# Patient Record
Sex: FEMALE | Race: WHITE | Employment: OTHER | URBAN - NONMETROPOLITAN AREA
[De-identification: names, ages, dates, MRNs, and addresses within clinical notes are randomized per-mention and may not be internally consistent; named-entity substitution may affect disease eponyms.]

---

## 2019-10-23 ENCOUNTER — HOSPITAL ENCOUNTER (OUTPATIENT)
Dept: GENERAL RADIOLOGY | Age: 74
Discharge: HOME | End: 2019-10-23
Payer: MEDICARE

## 2019-10-23 ENCOUNTER — HOSPITAL ENCOUNTER (OUTPATIENT)
Dept: PREADMISSION TESTING | Age: 74
Discharge: HOME | End: 2019-10-27
Payer: MEDICARE

## 2019-10-23 VITALS
DIASTOLIC BLOOD PRESSURE: 68 MMHG | BODY MASS INDEX: 23.9 KG/M2 | HEART RATE: 90 BPM | SYSTOLIC BLOOD PRESSURE: 164 MMHG | RESPIRATION RATE: 18 BRPM | TEMPERATURE: 96.2 F | WEIGHT: 129.85 LBS | OXYGEN SATURATION: 96 % | HEIGHT: 62 IN

## 2019-10-23 DIAGNOSIS — M48.061 SPINAL STENOSIS OF LUMBAR REGION, UNSPECIFIED WHETHER NEUROGENIC CLAUDICATION PRESENT: ICD-10-CM

## 2019-10-23 DIAGNOSIS — Z01.818 PREOP TESTING: ICD-10-CM

## 2019-10-23 LAB
ALBUMIN SERPL-MCNC: 3.8 G/DL (ref 3.5–5.1)
ANION GAP SERPL CALCULATED.3IONS-SCNC: 15 MEQ/L (ref 8–16)
BASOPHILS # BLD: 0.9 %
BASOPHILS ABSOLUTE: 0 THOU/MM3 (ref 0–0.1)
BUN BLDV-MCNC: 15 MG/DL (ref 7–22)
CALCIUM SERPL-MCNC: 9.5 MG/DL (ref 8.5–10.5)
CHLORIDE BLD-SCNC: 98 MEQ/L (ref 98–111)
CO2: 28 MEQ/L (ref 23–33)
CREAT SERPL-MCNC: 0.6 MG/DL (ref 0.4–1.2)
EOSINOPHIL # BLD: 2.2 %
EOSINOPHILS ABSOLUTE: 0.1 THOU/MM3 (ref 0–0.4)
ERYTHROCYTE [DISTWIDTH] IN BLOOD BY AUTOMATED COUNT: 13.7 % (ref 11.5–14.5)
ERYTHROCYTE [DISTWIDTH] IN BLOOD BY AUTOMATED COUNT: 46.5 FL (ref 35–45)
GFR SERPL CREATININE-BSD FRML MDRD: > 90 ML/MIN/1.73M2
GLUCOSE BLD-MCNC: 85 MG/DL (ref 70–108)
HCT VFR BLD CALC: 38 % (ref 37–47)
HEMOGLOBIN: 11.8 GM/DL (ref 12–16)
IMMATURE GRANS (ABS): 0.01 THOU/MM3 (ref 0–0.07)
IMMATURE GRANULOCYTES: 0.2 %
LYMPHOCYTES # BLD: 19.2 %
LYMPHOCYTES ABSOLUTE: 1 THOU/MM3 (ref 1–4.8)
MCH RBC QN AUTO: 29.1 PG (ref 26–33)
MCHC RBC AUTO-ENTMCNC: 31.1 GM/DL (ref 32.2–35.5)
MCV RBC AUTO: 93.8 FL (ref 81–99)
MONOCYTES # BLD: 11 %
MONOCYTES ABSOLUTE: 0.6 THOU/MM3 (ref 0.4–1.3)
MRSA NASAL SCREEN RT-PCR: NEGATIVE
NUCLEATED RED BLOOD CELLS: 0 /100 WBC
PLATELET # BLD: 302 THOU/MM3 (ref 130–400)
PMV BLD AUTO: 10.6 FL (ref 9.4–12.4)
POTASSIUM SERPL-SCNC: 3.7 MEQ/L (ref 3.5–5.2)
PREALBUMIN: 20.2 MG/DL (ref 20–40)
RBC # BLD: 4.05 MILL/MM3 (ref 4.2–5.4)
SEG NEUTROPHILS: 66.5 %
SEGMENTED NEUTROPHILS ABSOLUTE COUNT: 3.6 THOU/MM3 (ref 1.8–7.7)
SODIUM BLD-SCNC: 141 MEQ/L (ref 135–145)
STAPH AUREUS SCREEN RT-PCR: NEGATIVE
WBC # BLD: 5.4 THOU/MM3 (ref 4.8–10.8)

## 2019-10-23 PROCEDURE — 36415 COLL VENOUS BLD VENIPUNCTURE: CPT

## 2019-10-23 PROCEDURE — 87081 CULTURE SCREEN ONLY: CPT

## 2019-10-23 PROCEDURE — 87641 MR-STAPH DNA AMP PROBE: CPT

## 2019-10-23 PROCEDURE — 85025 COMPLETE CBC W/AUTO DIFF WBC: CPT

## 2019-10-23 PROCEDURE — 71046 X-RAY EXAM CHEST 2 VIEWS: CPT

## 2019-10-23 PROCEDURE — 87640 STAPH A DNA AMP PROBE: CPT

## 2019-10-23 PROCEDURE — 80048 BASIC METABOLIC PNL TOTAL CA: CPT

## 2019-10-23 PROCEDURE — 84134 ASSAY OF PREALBUMIN: CPT

## 2019-10-23 PROCEDURE — 82040 ASSAY OF SERUM ALBUMIN: CPT

## 2019-10-23 RX ORDER — POTASSIUM CHLORIDE 750 MG/1
10 CAPSULE, EXTENDED RELEASE ORAL 2 TIMES DAILY
COMMUNITY

## 2019-10-23 RX ORDER — LEVOTHYROXINE SODIUM 0.05 MG/1
50 TABLET ORAL DAILY
COMMUNITY

## 2019-10-23 RX ORDER — BUPROPION HYDROCHLORIDE 200 MG/1
200 TABLET, EXTENDED RELEASE ORAL DAILY
COMMUNITY

## 2019-10-23 RX ORDER — DULOXETIN HYDROCHLORIDE 30 MG/1
30 CAPSULE, DELAYED RELEASE ORAL DAILY
COMMUNITY

## 2019-10-23 RX ORDER — CHLORDIAZEPOXIDE HYDROCHLORIDE 5 MG/1
5 CAPSULE, GELATIN COATED ORAL 2 TIMES DAILY PRN
COMMUNITY

## 2019-10-23 RX ORDER — OXYCODONE HYDROCHLORIDE AND ACETAMINOPHEN 5; 325 MG/1; MG/1
1 TABLET ORAL EVERY 8 HOURS PRN
Status: ON HOLD | COMMUNITY
End: 2019-11-11 | Stop reason: HOSPADM

## 2019-10-23 RX ORDER — ALENDRONATE SODIUM 70 MG/1
70 TABLET ORAL
Status: ON HOLD | COMMUNITY
End: 2019-11-06 | Stop reason: ALTCHOICE

## 2019-10-23 RX ORDER — PANTOPRAZOLE SODIUM 40 MG/1
40 TABLET, DELAYED RELEASE ORAL 2 TIMES DAILY
COMMUNITY

## 2019-10-23 RX ORDER — TRIAMCINOLONE ACETONIDE 1 MG/ML
LOTION TOPICAL 3 TIMES DAILY
COMMUNITY

## 2019-10-23 RX ORDER — DULOXETIN HYDROCHLORIDE 60 MG/1
60 CAPSULE, DELAYED RELEASE ORAL NIGHTLY
COMMUNITY

## 2019-10-23 RX ORDER — GABAPENTIN 100 MG/1
200 CAPSULE ORAL 3 TIMES DAILY
COMMUNITY
End: 2020-10-01

## 2019-10-23 RX ORDER — SUCRALFATE 1 G/1
1 TABLET ORAL 4 TIMES DAILY
COMMUNITY

## 2019-10-23 RX ORDER — FERROUS SULFATE 325(65) MG
325 TABLET ORAL
COMMUNITY

## 2019-10-23 RX ORDER — FUROSEMIDE 40 MG/1
40 TABLET ORAL DAILY PRN
COMMUNITY

## 2019-10-25 LAB — MRSA SCREEN: NORMAL

## 2019-11-06 ENCOUNTER — ANESTHESIA EVENT (OUTPATIENT)
Dept: OPERATING ROOM | Age: 74
DRG: 457 | End: 2019-11-06
Payer: MEDICARE

## 2019-11-06 ENCOUNTER — HOSPITAL ENCOUNTER (INPATIENT)
Age: 74
LOS: 5 days | Discharge: NURSING, REHAB OR LONG-TERM CARE | DRG: 457 | End: 2019-11-11
Attending: ORTHOPAEDIC SURGERY | Admitting: ORTHOPAEDIC SURGERY
Payer: MEDICARE

## 2019-11-06 ENCOUNTER — APPOINTMENT (OUTPATIENT)
Dept: GENERAL RADIOLOGY | Age: 74
DRG: 457 | End: 2019-11-06
Attending: ORTHOPAEDIC SURGERY
Payer: MEDICARE

## 2019-11-06 ENCOUNTER — ANESTHESIA (OUTPATIENT)
Dept: OPERATING ROOM | Age: 74
DRG: 457 | End: 2019-11-06
Payer: MEDICARE

## 2019-11-06 VITALS
DIASTOLIC BLOOD PRESSURE: 66 MMHG | OXYGEN SATURATION: 100 % | RESPIRATION RATE: 5 BRPM | SYSTOLIC BLOOD PRESSURE: 127 MMHG | TEMPERATURE: 96.1 F

## 2019-11-06 DIAGNOSIS — Z98.1 S/P LUMBAR FUSION: Primary | ICD-10-CM

## 2019-11-06 PROBLEM — G95.19 NEUROGENIC CLAUDICATION (HCC): Status: ACTIVE | Noted: 2019-11-06

## 2019-11-06 PROBLEM — R29.818 NEUROGENIC CLAUDICATION: Status: ACTIVE | Noted: 2019-11-06

## 2019-11-06 LAB
ABO: NORMAL
ANTIBODY SCREEN: NORMAL
POTASSIUM SERPL-SCNC: 3.3 MEQ/L (ref 3.5–5.2)
RH FACTOR: NORMAL

## 2019-11-06 PROCEDURE — 2720000010 HC SURG SUPPLY STERILE: Performed by: ORTHOPAEDIC SURGERY

## 2019-11-06 PROCEDURE — 72100 X-RAY EXAM L-S SPINE 2/3 VWS: CPT

## 2019-11-06 PROCEDURE — 86850 RBC ANTIBODY SCREEN: CPT

## 2019-11-06 PROCEDURE — 2500000003 HC RX 250 WO HCPCS: Performed by: PHYSICIAN ASSISTANT

## 2019-11-06 PROCEDURE — 1200000000 HC SEMI PRIVATE

## 2019-11-06 PROCEDURE — 2500000003 HC RX 250 WO HCPCS: Performed by: NURSE ANESTHETIST, CERTIFIED REGISTERED

## 2019-11-06 PROCEDURE — 00NY0ZZ RELEASE LUMBAR SPINAL CORD, OPEN APPROACH: ICD-10-PCS | Performed by: ORTHOPAEDIC SURGERY

## 2019-11-06 PROCEDURE — P9016 RBC LEUKOCYTES REDUCED: HCPCS

## 2019-11-06 PROCEDURE — C1713 ANCHOR/SCREW BN/BN,TIS/BN: HCPCS | Performed by: ORTHOPAEDIC SURGERY

## 2019-11-06 PROCEDURE — 2580000003 HC RX 258: Performed by: ORTHOPAEDIC SURGERY

## 2019-11-06 PROCEDURE — 2580000003 HC RX 258: Performed by: PHYSICIAN ASSISTANT

## 2019-11-06 PROCEDURE — 6360000002 HC RX W HCPCS: Performed by: PHYSICIAN ASSISTANT

## 2019-11-06 PROCEDURE — 2580000003 HC RX 258: Performed by: NURSE ANESTHETIST, CERTIFIED REGISTERED

## 2019-11-06 PROCEDURE — 6370000000 HC RX 637 (ALT 250 FOR IP): Performed by: PHYSICIAN ASSISTANT

## 2019-11-06 PROCEDURE — 6360000002 HC RX W HCPCS: Performed by: ANESTHESIOLOGY

## 2019-11-06 PROCEDURE — 86901 BLOOD TYPING SEROLOGIC RH(D): CPT

## 2019-11-06 PROCEDURE — 72020 X-RAY EXAM OF SPINE 1 VIEW: CPT

## 2019-11-06 PROCEDURE — 86923 COMPATIBILITY TEST ELECTRIC: CPT

## 2019-11-06 PROCEDURE — 6360000002 HC RX W HCPCS

## 2019-11-06 PROCEDURE — 00PU3MZ REMOVAL OF NEUROSTIMULATOR LEAD FROM SPINAL CANAL, PERCUTANEOUS APPROACH: ICD-10-PCS | Performed by: ORTHOPAEDIC SURGERY

## 2019-11-06 PROCEDURE — P9045 ALBUMIN (HUMAN), 5%, 250 ML: HCPCS | Performed by: NURSE ANESTHETIST, CERTIFIED REGISTERED

## 2019-11-06 PROCEDURE — 2709999900 HC NON-CHARGEABLE SUPPLY: Performed by: ORTHOPAEDIC SURGERY

## 2019-11-06 PROCEDURE — 6360000002 HC RX W HCPCS: Performed by: NURSE ANESTHETIST, CERTIFIED REGISTERED

## 2019-11-06 PROCEDURE — 7100000000 HC PACU RECOVERY - FIRST 15 MIN: Performed by: ORTHOPAEDIC SURGERY

## 2019-11-06 PROCEDURE — 2700000000 HC OXYGEN THERAPY PER DAY

## 2019-11-06 PROCEDURE — 36415 COLL VENOUS BLD VENIPUNCTURE: CPT

## 2019-11-06 PROCEDURE — 3700000000 HC ANESTHESIA ATTENDED CARE: Performed by: ORTHOPAEDIC SURGERY

## 2019-11-06 PROCEDURE — 7100000001 HC PACU RECOVERY - ADDTL 15 MIN: Performed by: ORTHOPAEDIC SURGERY

## 2019-11-06 PROCEDURE — 0JPT0MZ REMOVAL OF STIMULATOR GENERATOR FROM TRUNK SUBCUTANEOUS TISSUE AND FASCIA, OPEN APPROACH: ICD-10-PCS | Performed by: ORTHOPAEDIC SURGERY

## 2019-11-06 PROCEDURE — 94760 N-INVAS EAR/PLS OXIMETRY 1: CPT

## 2019-11-06 PROCEDURE — 3700000001 HC ADD 15 MINUTES (ANESTHESIA): Performed by: ORTHOPAEDIC SURGERY

## 2019-11-06 PROCEDURE — 3600000005 HC SURGERY LEVEL 5 BASE: Performed by: ORTHOPAEDIC SURGERY

## 2019-11-06 PROCEDURE — 2500000003 HC RX 250 WO HCPCS: Performed by: ORTHOPAEDIC SURGERY

## 2019-11-06 PROCEDURE — 3600000015 HC SURGERY LEVEL 5 ADDTL 15MIN: Performed by: ORTHOPAEDIC SURGERY

## 2019-11-06 PROCEDURE — 84132 ASSAY OF SERUM POTASSIUM: CPT

## 2019-11-06 PROCEDURE — 0SG1071 FUSION OF 2 OR MORE LUMBAR VERTEBRAL JOINTS WITH AUTOLOGOUS TISSUE SUBSTITUTE, POSTERIOR APPROACH, POSTERIOR COLUMN, OPEN APPROACH: ICD-10-PCS | Performed by: ORTHOPAEDIC SURGERY

## 2019-11-06 PROCEDURE — 01NB0ZZ RELEASE LUMBAR NERVE, OPEN APPROACH: ICD-10-PCS | Performed by: ORTHOPAEDIC SURGERY

## 2019-11-06 PROCEDURE — 86900 BLOOD TYPING SEROLOGIC ABO: CPT

## 2019-11-06 DEVICE — IMPLANTABLE DEVICE
Type: IMPLANTABLE DEVICE | Site: SPINE LUMBAR | Status: FUNCTIONAL
Brand: SET SCREW

## 2019-11-06 DEVICE — CURVED ROD, 5.5 X 80
Type: IMPLANTABLE DEVICE | Site: SPINE LUMBAR | Status: FUNCTIONAL
Brand: CURVED ROD, 5.5 X 80

## 2019-11-06 DEVICE — POLYAXIAL SCREW, 6.5 X 45
Type: IMPLANTABLE DEVICE | Site: SPINE LUMBAR | Status: FUNCTIONAL
Brand: POLYAXIAL SCREW, 6.5 X 45

## 2019-11-06 DEVICE — POLYAXIAL SCREW, 7.5 X 45
Type: IMPLANTABLE DEVICE | Site: SPINE LUMBAR | Status: FUNCTIONAL
Brand: POLYAXIAL SCREW, 7.5 X 45

## 2019-11-06 DEVICE — CROSS CONNECTOR, MEDIUM-LARGE
Type: IMPLANTABLE DEVICE | Site: SPINE LUMBAR | Status: FUNCTIONAL
Brand: CROSS CONNECTOR, MEDIUM-LARGE

## 2019-11-06 DEVICE — DBM T42200 2.5CMX10CM 2 EACH GRAFTON MAT
Type: IMPLANTABLE DEVICE | Site: SPINE LUMBAR | Status: FUNCTIONAL
Brand: GRAFTON®AND GRAFTON PLUS®DEMINERALIZED BONE MATRIX (DBM)

## 2019-11-06 RX ORDER — PROMETHAZINE HYDROCHLORIDE 25 MG/ML
12.5 INJECTION, SOLUTION INTRAMUSCULAR; INTRAVENOUS
Status: DISCONTINUED | OUTPATIENT
Start: 2019-11-06 | End: 2019-11-06 | Stop reason: HOSPADM

## 2019-11-06 RX ORDER — BISACODYL 10 MG
10 SUPPOSITORY, RECTAL RECTAL DAILY PRN
Status: DISCONTINUED | OUTPATIENT
Start: 2019-11-06 | End: 2019-11-11 | Stop reason: HOSPADM

## 2019-11-06 RX ORDER — LEVOTHYROXINE SODIUM 0.05 MG/1
50 TABLET ORAL DAILY
Status: DISCONTINUED | OUTPATIENT
Start: 2019-11-07 | End: 2019-11-11 | Stop reason: HOSPADM

## 2019-11-06 RX ORDER — ACETAMINOPHEN 325 MG/1
650 TABLET ORAL EVERY 6 HOURS
Status: DISCONTINUED | OUTPATIENT
Start: 2019-11-06 | End: 2019-11-11 | Stop reason: HOSPADM

## 2019-11-06 RX ORDER — DULOXETIN HYDROCHLORIDE 60 MG/1
60 CAPSULE, DELAYED RELEASE ORAL DAILY
Status: DISCONTINUED | OUTPATIENT
Start: 2019-11-06 | End: 2019-11-11 | Stop reason: HOSPADM

## 2019-11-06 RX ORDER — PROPOFOL 10 MG/ML
INJECTION, EMULSION INTRAVENOUS PRN
Status: DISCONTINUED | OUTPATIENT
Start: 2019-11-06 | End: 2019-11-06 | Stop reason: SDUPTHER

## 2019-11-06 RX ORDER — CYCLOBENZAPRINE HCL 10 MG
10 TABLET ORAL 3 TIMES DAILY PRN
Status: DISCONTINUED | OUTPATIENT
Start: 2019-11-06 | End: 2019-11-11 | Stop reason: HOSPADM

## 2019-11-06 RX ORDER — GABAPENTIN 100 MG/1
200 CAPSULE ORAL 3 TIMES DAILY
Status: DISCONTINUED | OUTPATIENT
Start: 2019-11-06 | End: 2019-11-11 | Stop reason: HOSPADM

## 2019-11-06 RX ORDER — SODIUM CHLORIDE 9 MG/ML
INJECTION, SOLUTION INTRAVENOUS CONTINUOUS PRN
Status: DISCONTINUED | OUTPATIENT
Start: 2019-11-06 | End: 2019-11-06 | Stop reason: SDUPTHER

## 2019-11-06 RX ORDER — NEOSTIGMINE METHYLSULFATE 5 MG/5 ML
SYRINGE (ML) INTRAVENOUS PRN
Status: DISCONTINUED | OUTPATIENT
Start: 2019-11-06 | End: 2019-11-06 | Stop reason: SDUPTHER

## 2019-11-06 RX ORDER — FUROSEMIDE 40 MG/1
40 TABLET ORAL 2 TIMES DAILY
Status: DISCONTINUED | OUTPATIENT
Start: 2019-11-06 | End: 2019-11-11 | Stop reason: HOSPADM

## 2019-11-06 RX ORDER — MEPERIDINE HYDROCHLORIDE 25 MG/ML
12.5 INJECTION INTRAMUSCULAR; INTRAVENOUS; SUBCUTANEOUS EVERY 5 MIN PRN
Status: DISCONTINUED | OUTPATIENT
Start: 2019-11-06 | End: 2019-11-06 | Stop reason: HOSPADM

## 2019-11-06 RX ORDER — FENTANYL CITRATE 50 UG/ML
INJECTION, SOLUTION INTRAMUSCULAR; INTRAVENOUS PRN
Status: DISCONTINUED | OUTPATIENT
Start: 2019-11-06 | End: 2019-11-06 | Stop reason: SDUPTHER

## 2019-11-06 RX ORDER — TRIAMCINOLONE ACETONIDE 1 MG/G
CREAM TOPICAL 3 TIMES DAILY
Status: DISCONTINUED | OUTPATIENT
Start: 2019-11-06 | End: 2019-11-11 | Stop reason: HOSPADM

## 2019-11-06 RX ORDER — SUCRALFATE 1 G/1
1 TABLET ORAL
Status: DISCONTINUED | OUTPATIENT
Start: 2019-11-06 | End: 2019-11-11 | Stop reason: HOSPADM

## 2019-11-06 RX ORDER — SODIUM CHLORIDE 0.9 % (FLUSH) 0.9 %
10 SYRINGE (ML) INJECTION EVERY 12 HOURS SCHEDULED
Status: DISCONTINUED | OUTPATIENT
Start: 2019-11-06 | End: 2019-11-11 | Stop reason: HOSPADM

## 2019-11-06 RX ORDER — CLINDAMYCIN PHOSPHATE 900 MG/50ML
900 INJECTION INTRAVENOUS
Status: COMPLETED | OUTPATIENT
Start: 2019-11-06 | End: 2019-11-06

## 2019-11-06 RX ORDER — LIDOCAINE HYDROCHLORIDE AND EPINEPHRINE 10; 10 MG/ML; UG/ML
INJECTION, SOLUTION INFILTRATION; PERINEURAL PRN
Status: DISCONTINUED | OUTPATIENT
Start: 2019-11-06 | End: 2019-11-06 | Stop reason: HOSPADM

## 2019-11-06 RX ORDER — OXYCODONE HYDROCHLORIDE AND ACETAMINOPHEN 5; 325 MG/1; MG/1
2 TABLET ORAL EVERY 4 HOURS PRN
Status: DISCONTINUED | OUTPATIENT
Start: 2019-11-06 | End: 2019-11-11 | Stop reason: HOSPADM

## 2019-11-06 RX ORDER — SODIUM CHLORIDE 0.9 % (FLUSH) 0.9 %
10 SYRINGE (ML) INJECTION PRN
Status: DISCONTINUED | OUTPATIENT
Start: 2019-11-06 | End: 2019-11-11 | Stop reason: HOSPADM

## 2019-11-06 RX ORDER — BUPROPION HYDROCHLORIDE 100 MG/1
200 TABLET, EXTENDED RELEASE ORAL DAILY
Status: DISCONTINUED | OUTPATIENT
Start: 2019-11-07 | End: 2019-11-11 | Stop reason: HOSPADM

## 2019-11-06 RX ORDER — DULOXETIN HYDROCHLORIDE 30 MG/1
30 CAPSULE, DELAYED RELEASE ORAL DAILY
Status: DISCONTINUED | OUTPATIENT
Start: 2019-11-07 | End: 2019-11-11 | Stop reason: HOSPADM

## 2019-11-06 RX ORDER — PANTOPRAZOLE SODIUM 40 MG/1
40 TABLET, DELAYED RELEASE ORAL 2 TIMES DAILY
Status: DISCONTINUED | OUTPATIENT
Start: 2019-11-06 | End: 2019-11-11 | Stop reason: HOSPADM

## 2019-11-06 RX ORDER — DOCUSATE SODIUM 100 MG/1
100 CAPSULE, LIQUID FILLED ORAL 2 TIMES DAILY
Status: DISCONTINUED | OUTPATIENT
Start: 2019-11-06 | End: 2019-11-11 | Stop reason: HOSPADM

## 2019-11-06 RX ORDER — SUCCINYLCHOLINE/SOD CL,ISO/PF 200MG/10ML
SYRINGE (ML) INTRAVENOUS PRN
Status: DISCONTINUED | OUTPATIENT
Start: 2019-11-06 | End: 2019-11-06 | Stop reason: SDUPTHER

## 2019-11-06 RX ORDER — SODIUM CHLORIDE 9 MG/ML
INJECTION, SOLUTION INTRAVENOUS ONCE
Status: COMPLETED | OUTPATIENT
Start: 2019-11-06 | End: 2019-11-06

## 2019-11-06 RX ORDER — SODIUM CHLORIDE 0.9 % (FLUSH) 0.9 %
10 SYRINGE (ML) INJECTION PRN
Status: DISCONTINUED | OUTPATIENT
Start: 2019-11-06 | End: 2019-11-06 | Stop reason: HOSPADM

## 2019-11-06 RX ORDER — ONDANSETRON 2 MG/ML
INJECTION INTRAMUSCULAR; INTRAVENOUS PRN
Status: DISCONTINUED | OUTPATIENT
Start: 2019-11-06 | End: 2019-11-06 | Stop reason: SDUPTHER

## 2019-11-06 RX ORDER — OXYCODONE HYDROCHLORIDE AND ACETAMINOPHEN 5; 325 MG/1; MG/1
1 TABLET ORAL EVERY 4 HOURS PRN
Status: DISCONTINUED | OUTPATIENT
Start: 2019-11-06 | End: 2019-11-11 | Stop reason: HOSPADM

## 2019-11-06 RX ORDER — LIDOCAINE HCL/PF 100 MG/5ML
SYRINGE (ML) INJECTION PRN
Status: DISCONTINUED | OUTPATIENT
Start: 2019-11-06 | End: 2019-11-06 | Stop reason: SDUPTHER

## 2019-11-06 RX ORDER — DEXAMETHASONE SODIUM PHOSPHATE 4 MG/ML
INJECTION, SOLUTION INTRA-ARTICULAR; INTRALESIONAL; INTRAMUSCULAR; INTRAVENOUS; SOFT TISSUE PRN
Status: DISCONTINUED | OUTPATIENT
Start: 2019-11-06 | End: 2019-11-06 | Stop reason: SDUPTHER

## 2019-11-06 RX ORDER — SODIUM CHLORIDE 9 MG/ML
INJECTION, SOLUTION INTRAVENOUS CONTINUOUS
Status: DISCONTINUED | OUTPATIENT
Start: 2019-11-06 | End: 2019-11-11 | Stop reason: HOSPADM

## 2019-11-06 RX ORDER — SODIUM CHLORIDE 0.9 % (FLUSH) 0.9 %
10 SYRINGE (ML) INJECTION EVERY 12 HOURS SCHEDULED
Status: DISCONTINUED | OUTPATIENT
Start: 2019-11-06 | End: 2019-11-06 | Stop reason: HOSPADM

## 2019-11-06 RX ORDER — CHLORDIAZEPOXIDE HYDROCHLORIDE 5 MG/1
5 CAPSULE, GELATIN COATED ORAL 2 TIMES DAILY PRN
Status: DISCONTINUED | OUTPATIENT
Start: 2019-11-06 | End: 2019-11-11 | Stop reason: HOSPADM

## 2019-11-06 RX ORDER — ALBUMIN, HUMAN INJ 5% 5 %
SOLUTION INTRAVENOUS PRN
Status: DISCONTINUED | OUTPATIENT
Start: 2019-11-06 | End: 2019-11-06 | Stop reason: SDUPTHER

## 2019-11-06 RX ORDER — POTASSIUM CHLORIDE 750 MG/1
10 TABLET, FILM COATED, EXTENDED RELEASE ORAL 2 TIMES DAILY
Status: DISCONTINUED | OUTPATIENT
Start: 2019-11-06 | End: 2019-11-11 | Stop reason: HOSPADM

## 2019-11-06 RX ORDER — FENTANYL CITRATE 50 UG/ML
50 INJECTION, SOLUTION INTRAMUSCULAR; INTRAVENOUS EVERY 5 MIN PRN
Status: DISCONTINUED | OUTPATIENT
Start: 2019-11-06 | End: 2019-11-06 | Stop reason: HOSPADM

## 2019-11-06 RX ORDER — GLYCOPYRROLATE 1 MG/5 ML
SYRINGE (ML) INTRAVENOUS PRN
Status: DISCONTINUED | OUTPATIENT
Start: 2019-11-06 | End: 2019-11-06 | Stop reason: SDUPTHER

## 2019-11-06 RX ORDER — FENTANYL CITRATE 50 UG/ML
INJECTION, SOLUTION INTRAMUSCULAR; INTRAVENOUS
Status: COMPLETED
Start: 2019-11-06 | End: 2019-11-06

## 2019-11-06 RX ORDER — FENTANYL CITRATE 50 UG/ML
25 INJECTION, SOLUTION INTRAMUSCULAR; INTRAVENOUS EVERY 5 MIN PRN
Status: DISCONTINUED | OUTPATIENT
Start: 2019-11-06 | End: 2019-11-06 | Stop reason: HOSPADM

## 2019-11-06 RX ORDER — ONDANSETRON 2 MG/ML
4 INJECTION INTRAMUSCULAR; INTRAVENOUS EVERY 6 HOURS PRN
Status: DISCONTINUED | OUTPATIENT
Start: 2019-11-06 | End: 2019-11-11 | Stop reason: HOSPADM

## 2019-11-06 RX ORDER — POLYETHYLENE GLYCOL 3350 17 G/17G
17 POWDER, FOR SOLUTION ORAL DAILY
Status: DISCONTINUED | OUTPATIENT
Start: 2019-11-06 | End: 2019-11-11 | Stop reason: HOSPADM

## 2019-11-06 RX ORDER — ROCURONIUM BROMIDE 10 MG/ML
INJECTION, SOLUTION INTRAVENOUS PRN
Status: DISCONTINUED | OUTPATIENT
Start: 2019-11-06 | End: 2019-11-06 | Stop reason: SDUPTHER

## 2019-11-06 RX ORDER — LABETALOL 20 MG/4 ML (5 MG/ML) INTRAVENOUS SYRINGE
5 EVERY 10 MIN PRN
Status: DISCONTINUED | OUTPATIENT
Start: 2019-11-06 | End: 2019-11-06 | Stop reason: HOSPADM

## 2019-11-06 RX ADMIN — Medication 120 MG: at 11:10

## 2019-11-06 RX ADMIN — FENTANYL CITRATE 50 MCG: 50 INJECTION INTRAMUSCULAR; INTRAVENOUS at 12:06

## 2019-11-06 RX ADMIN — OXYCODONE HYDROCHLORIDE AND ACETAMINOPHEN 2 TABLET: 5; 325 TABLET ORAL at 22:29

## 2019-11-06 RX ADMIN — OXYCODONE HYDROCHLORIDE AND ACETAMINOPHEN 2 TABLET: 5; 325 TABLET ORAL at 17:23

## 2019-11-06 RX ADMIN — Medication 4 MG: at 13:53

## 2019-11-06 RX ADMIN — Medication 2 G: at 18:30

## 2019-11-06 RX ADMIN — CLINDAMYCIN PHOSPHATE 900 MG: 900 INJECTION, SOLUTION INTRAVENOUS at 11:42

## 2019-11-06 RX ADMIN — FENTANYL CITRATE 50 MCG: 50 INJECTION, SOLUTION INTRAMUSCULAR; INTRAVENOUS at 14:46

## 2019-11-06 RX ADMIN — FENTANYL CITRATE 50 MCG: 50 INJECTION, SOLUTION INTRAMUSCULAR; INTRAVENOUS at 15:12

## 2019-11-06 RX ADMIN — PHENYLEPHRINE HYDROCHLORIDE 200 MCG: 10 INJECTION INTRAVENOUS at 13:02

## 2019-11-06 RX ADMIN — SODIUM CHLORIDE: 9 INJECTION, SOLUTION INTRAVENOUS at 13:44

## 2019-11-06 RX ADMIN — SUCRALFATE 1 G: 1 TABLET ORAL at 20:21

## 2019-11-06 RX ADMIN — HYDROMORPHONE HYDROCHLORIDE 0.5 MG: 1 INJECTION, SOLUTION INTRAMUSCULAR; INTRAVENOUS; SUBCUTANEOUS at 20:18

## 2019-11-06 RX ADMIN — FENTANYL CITRATE 50 MCG: 50 INJECTION INTRAMUSCULAR; INTRAVENOUS at 14:32

## 2019-11-06 RX ADMIN — DEXAMETHASONE SODIUM PHOSPHATE 8 MG: 4 INJECTION, SOLUTION INTRAMUSCULAR; INTRAVENOUS at 12:08

## 2019-11-06 RX ADMIN — TRIAMCINOLONE ACETONIDE: 1 CREAM TOPICAL at 20:21

## 2019-11-06 RX ADMIN — ALBUMIN (HUMAN) 250 ML: 12.5 SOLUTION INTRAVENOUS at 12:45

## 2019-11-06 RX ADMIN — PHENYLEPHRINE HYDROCHLORIDE 200 MCG: 10 INJECTION INTRAVENOUS at 13:24

## 2019-11-06 RX ADMIN — DOCUSATE SODIUM 100 MG: 100 CAPSULE, LIQUID FILLED ORAL at 20:22

## 2019-11-06 RX ADMIN — SODIUM CHLORIDE: 9 INJECTION, SOLUTION INTRAVENOUS at 11:40

## 2019-11-06 RX ADMIN — SODIUM CHLORIDE: 9 INJECTION, SOLUTION INTRAVENOUS at 20:25

## 2019-11-06 RX ADMIN — SODIUM CHLORIDE: 9 INJECTION, SOLUTION INTRAVENOUS at 12:00

## 2019-11-06 RX ADMIN — FENTANYL CITRATE 100 MCG: 50 INJECTION INTRAMUSCULAR; INTRAVENOUS at 11:10

## 2019-11-06 RX ADMIN — GABAPENTIN 200 MG: 100 CAPSULE ORAL at 20:21

## 2019-11-06 RX ADMIN — ROCURONIUM BROMIDE 30 MG: 10 INJECTION INTRAVENOUS at 11:29

## 2019-11-06 RX ADMIN — SODIUM CHLORIDE: 9 INJECTION, SOLUTION INTRAVENOUS at 10:15

## 2019-11-06 RX ADMIN — PHENYLEPHRINE HYDROCHLORIDE 200 MCG: 10 INJECTION INTRAVENOUS at 13:13

## 2019-11-06 RX ADMIN — PROPOFOL 100 MG: 10 INJECTION, EMULSION INTRAVENOUS at 11:10

## 2019-11-06 RX ADMIN — DULOXETINE HYDROCHLORIDE 60 MG: 60 CAPSULE, DELAYED RELEASE ORAL at 20:22

## 2019-11-06 RX ADMIN — SODIUM CHLORIDE: 9 INJECTION, SOLUTION INTRAVENOUS at 10:30

## 2019-11-06 RX ADMIN — ONDANSETRON HYDROCHLORIDE 4 MG: 4 INJECTION, SOLUTION INTRAMUSCULAR; INTRAVENOUS at 13:27

## 2019-11-06 RX ADMIN — Medication 50 MG: at 11:10

## 2019-11-06 RX ADMIN — POTASSIUM CHLORIDE 10 MEQ: 750 TABLET, EXTENDED RELEASE ORAL at 20:22

## 2019-11-06 RX ADMIN — Medication 0.6 MG: at 13:53

## 2019-11-06 RX ADMIN — FENTANYL CITRATE 50 MCG: 50 INJECTION INTRAMUSCULAR; INTRAVENOUS at 14:27

## 2019-11-06 RX ADMIN — PHENYLEPHRINE HYDROCHLORIDE 200 MCG: 10 INJECTION INTRAVENOUS at 12:47

## 2019-11-06 RX ADMIN — PROPOFOL 100 MG: 10 INJECTION, EMULSION INTRAVENOUS at 11:21

## 2019-11-06 ASSESSMENT — PULMONARY FUNCTION TESTS
PIF_VALUE: 19
PIF_VALUE: 19
PIF_VALUE: 0
PIF_VALUE: 10
PIF_VALUE: 19
PIF_VALUE: 19
PIF_VALUE: 20
PIF_VALUE: 14
PIF_VALUE: 19
PIF_VALUE: 18
PIF_VALUE: 14
PIF_VALUE: 1
PIF_VALUE: 19
PIF_VALUE: 21
PIF_VALUE: 18
PIF_VALUE: 19
PIF_VALUE: 10
PIF_VALUE: 19
PIF_VALUE: 1
PIF_VALUE: 19
PIF_VALUE: 19
PIF_VALUE: 21
PIF_VALUE: 19
PIF_VALUE: 1
PIF_VALUE: 20
PIF_VALUE: 0
PIF_VALUE: 14
PIF_VALUE: 14
PIF_VALUE: 19
PIF_VALUE: 18
PIF_VALUE: 20
PIF_VALUE: 18
PIF_VALUE: 19
PIF_VALUE: 18
PIF_VALUE: 23
PIF_VALUE: 22
PIF_VALUE: 19
PIF_VALUE: 7
PIF_VALUE: 19
PIF_VALUE: 14
PIF_VALUE: 21
PIF_VALUE: 18
PIF_VALUE: 19
PIF_VALUE: 20
PIF_VALUE: 18
PIF_VALUE: 19
PIF_VALUE: 17
PIF_VALUE: 0
PIF_VALUE: 18
PIF_VALUE: 20
PIF_VALUE: 14
PIF_VALUE: 18
PIF_VALUE: 19
PIF_VALUE: 10
PIF_VALUE: 1
PIF_VALUE: 22
PIF_VALUE: 19
PIF_VALUE: 1
PIF_VALUE: 19
PIF_VALUE: 2
PIF_VALUE: 19
PIF_VALUE: 17
PIF_VALUE: 19
PIF_VALUE: 18
PIF_VALUE: 18
PIF_VALUE: 19
PIF_VALUE: 10
PIF_VALUE: 20
PIF_VALUE: 18
PIF_VALUE: 19
PIF_VALUE: 18
PIF_VALUE: 15
PIF_VALUE: 19
PIF_VALUE: 18
PIF_VALUE: 19
PIF_VALUE: 18
PIF_VALUE: 19
PIF_VALUE: 18
PIF_VALUE: 18
PIF_VALUE: 20
PIF_VALUE: 18
PIF_VALUE: 0
PIF_VALUE: 1
PIF_VALUE: 19
PIF_VALUE: 2
PIF_VALUE: 19
PIF_VALUE: 14
PIF_VALUE: 14
PIF_VALUE: 19
PIF_VALUE: 11
PIF_VALUE: 18
PIF_VALUE: 19
PIF_VALUE: 17
PIF_VALUE: 19
PIF_VALUE: 19
PIF_VALUE: 11
PIF_VALUE: 21
PIF_VALUE: 0
PIF_VALUE: 19
PIF_VALUE: 14
PIF_VALUE: 1
PIF_VALUE: 2
PIF_VALUE: 18
PIF_VALUE: 19
PIF_VALUE: 19
PIF_VALUE: 10
PIF_VALUE: 10
PIF_VALUE: 19
PIF_VALUE: 19
PIF_VALUE: 18
PIF_VALUE: 19
PIF_VALUE: 18
PIF_VALUE: 1
PIF_VALUE: 19
PIF_VALUE: 19
PIF_VALUE: 20
PIF_VALUE: 20
PIF_VALUE: 18
PIF_VALUE: 19
PIF_VALUE: 20
PIF_VALUE: 1
PIF_VALUE: 19
PIF_VALUE: 1
PIF_VALUE: 19
PIF_VALUE: 10
PIF_VALUE: 19
PIF_VALUE: 19
PIF_VALUE: 20
PIF_VALUE: 14
PIF_VALUE: 19
PIF_VALUE: 19
PIF_VALUE: 18
PIF_VALUE: 19
PIF_VALUE: 14
PIF_VALUE: 13
PIF_VALUE: 19
PIF_VALUE: 14
PIF_VALUE: 19
PIF_VALUE: 20
PIF_VALUE: 3
PIF_VALUE: 18
PIF_VALUE: 18
PIF_VALUE: 19
PIF_VALUE: 10
PIF_VALUE: 21
PIF_VALUE: 10
PIF_VALUE: 19
PIF_VALUE: 18
PIF_VALUE: 10
PIF_VALUE: 0
PIF_VALUE: 19
PIF_VALUE: 19
PIF_VALUE: 14
PIF_VALUE: 19
PIF_VALUE: 13
PIF_VALUE: 19
PIF_VALUE: 19
PIF_VALUE: 20
PIF_VALUE: 20
PIF_VALUE: 18
PIF_VALUE: 18
PIF_VALUE: 20
PIF_VALUE: 19
PIF_VALUE: 14
PIF_VALUE: 20
PIF_VALUE: 18
PIF_VALUE: 19
PIF_VALUE: 18
PIF_VALUE: 5

## 2019-11-06 ASSESSMENT — PAIN DESCRIPTION - DESCRIPTORS
DESCRIPTORS: PRESSURE;SHARP;SHOOTING
DESCRIPTORS: CONSTANT;SHARP

## 2019-11-06 ASSESSMENT — PAIN - FUNCTIONAL ASSESSMENT
PAIN_FUNCTIONAL_ASSESSMENT: 0-10
PAIN_FUNCTIONAL_ASSESSMENT: PREVENTS OR INTERFERES SOME ACTIVE ACTIVITIES AND ADLS

## 2019-11-06 ASSESSMENT — PAIN SCALES - GENERAL
PAINLEVEL_OUTOF10: 10
PAINLEVEL_OUTOF10: 10
PAINLEVEL_OUTOF10: 7
PAINLEVEL_OUTOF10: 9
PAINLEVEL_OUTOF10: 10

## 2019-11-06 ASSESSMENT — PAIN DESCRIPTION - PROGRESSION
CLINICAL_PROGRESSION: NOT CHANGED

## 2019-11-06 ASSESSMENT — PAIN DESCRIPTION - PAIN TYPE
TYPE: SURGICAL PAIN
TYPE: ACUTE PAIN;SURGICAL PAIN

## 2019-11-06 ASSESSMENT — PAIN DESCRIPTION - LOCATION
LOCATION: BACK
LOCATION: BACK

## 2019-11-06 ASSESSMENT — PAIN DESCRIPTION - FREQUENCY: FREQUENCY: CONTINUOUS

## 2019-11-06 ASSESSMENT — PAIN DESCRIPTION - ORIENTATION: ORIENTATION: MID;UPPER

## 2019-11-07 LAB
BASOPHILS # BLD: 0.6 %
BASOPHILS ABSOLUTE: 0 THOU/MM3 (ref 0–0.1)
EOSINOPHIL # BLD: 0.3 %
EOSINOPHILS ABSOLUTE: 0 THOU/MM3 (ref 0–0.4)
ERYTHROCYTE [DISTWIDTH] IN BLOOD BY AUTOMATED COUNT: 13.7 % (ref 11.5–14.5)
ERYTHROCYTE [DISTWIDTH] IN BLOOD BY AUTOMATED COUNT: 49 FL (ref 35–45)
HCT VFR BLD CALC: 25.3 % (ref 37–47)
HEMOGLOBIN: 7.4 GM/DL (ref 12–16)
IMMATURE GRANS (ABS): 0.01 THOU/MM3 (ref 0–0.07)
IMMATURE GRANULOCYTES: 0.1 %
LYMPHOCYTES # BLD: 14.6 %
LYMPHOCYTES ABSOLUTE: 1 THOU/MM3 (ref 1–4.8)
MCH RBC QN AUTO: 28.9 PG (ref 26–33)
MCHC RBC AUTO-ENTMCNC: 29.2 GM/DL (ref 32.2–35.5)
MCV RBC AUTO: 98.8 FL (ref 81–99)
MONOCYTES # BLD: 11.1 %
MONOCYTES ABSOLUTE: 0.8 THOU/MM3 (ref 0.4–1.3)
NUCLEATED RED BLOOD CELLS: 0 /100 WBC
PLATELET # BLD: 232 THOU/MM3 (ref 130–400)
PMV BLD AUTO: 10.1 FL (ref 9.4–12.4)
RBC # BLD: 2.56 MILL/MM3 (ref 4.2–5.4)
SEG NEUTROPHILS: 73.3 %
SEGMENTED NEUTROPHILS ABSOLUTE COUNT: 5.1 THOU/MM3 (ref 1.8–7.7)
WBC # BLD: 6.9 THOU/MM3 (ref 4.8–10.8)

## 2019-11-07 PROCEDURE — 1200000000 HC SEMI PRIVATE

## 2019-11-07 PROCEDURE — 6370000000 HC RX 637 (ALT 250 FOR IP): Performed by: PHYSICIAN ASSISTANT

## 2019-11-07 PROCEDURE — 97162 PT EVAL MOD COMPLEX 30 MIN: CPT

## 2019-11-07 PROCEDURE — 85025 COMPLETE CBC W/AUTO DIFF WBC: CPT

## 2019-11-07 PROCEDURE — 94760 N-INVAS EAR/PLS OXIMETRY 1: CPT

## 2019-11-07 PROCEDURE — 97166 OT EVAL MOD COMPLEX 45 MIN: CPT

## 2019-11-07 PROCEDURE — 6360000002 HC RX W HCPCS: Performed by: PHYSICIAN ASSISTANT

## 2019-11-07 PROCEDURE — 97530 THERAPEUTIC ACTIVITIES: CPT

## 2019-11-07 PROCEDURE — 36415 COLL VENOUS BLD VENIPUNCTURE: CPT

## 2019-11-07 RX ADMIN — HYDROMORPHONE HYDROCHLORIDE 0.5 MG: 1 INJECTION, SOLUTION INTRAMUSCULAR; INTRAVENOUS; SUBCUTANEOUS at 12:06

## 2019-11-07 RX ADMIN — DOCUSATE SODIUM 100 MG: 100 CAPSULE, LIQUID FILLED ORAL at 09:18

## 2019-11-07 RX ADMIN — Medication 2 G: at 02:08

## 2019-11-07 RX ADMIN — SUCRALFATE 1 G: 1 TABLET ORAL at 18:51

## 2019-11-07 RX ADMIN — GABAPENTIN 200 MG: 100 CAPSULE ORAL at 09:18

## 2019-11-07 RX ADMIN — OXYCODONE HYDROCHLORIDE AND ACETAMINOPHEN 2 TABLET: 5; 325 TABLET ORAL at 08:58

## 2019-11-07 RX ADMIN — OXYCODONE HYDROCHLORIDE AND ACETAMINOPHEN 2 TABLET: 5; 325 TABLET ORAL at 17:42

## 2019-11-07 RX ADMIN — DULOXETINE HYDROCHLORIDE 30 MG: 30 CAPSULE, DELAYED RELEASE ORAL at 17:54

## 2019-11-07 RX ADMIN — HYDROMORPHONE HYDROCHLORIDE 0.5 MG: 1 INJECTION, SOLUTION INTRAMUSCULAR; INTRAVENOUS; SUBCUTANEOUS at 16:15

## 2019-11-07 RX ADMIN — FUROSEMIDE 40 MG: 40 TABLET ORAL at 09:22

## 2019-11-07 RX ADMIN — LEVOTHYROXINE SODIUM 50 MCG: 50 TABLET ORAL at 06:49

## 2019-11-07 RX ADMIN — NALOXEGOL OXALATE 12.5 MG: 12.5 TABLET, FILM COATED ORAL at 09:18

## 2019-11-07 RX ADMIN — CYCLOBENZAPRINE 10 MG: 10 TABLET, FILM COATED ORAL at 18:52

## 2019-11-07 RX ADMIN — POTASSIUM CHLORIDE 10 MEQ: 750 TABLET, EXTENDED RELEASE ORAL at 09:18

## 2019-11-07 RX ADMIN — DULOXETINE HYDROCHLORIDE 60 MG: 60 CAPSULE, DELAYED RELEASE ORAL at 22:41

## 2019-11-07 RX ADMIN — FUROSEMIDE 40 MG: 40 TABLET ORAL at 18:52

## 2019-11-07 RX ADMIN — CHLORDIAZEPOXIDE HYDROCHLORIDE 5 MG: 5 CAPSULE ORAL at 12:19

## 2019-11-07 RX ADMIN — PANTOPRAZOLE SODIUM 40 MG: 40 TABLET, DELAYED RELEASE ORAL at 06:49

## 2019-11-07 RX ADMIN — BUPROPION HYDROCHLORIDE 200 MG: 100 TABLET, FILM COATED, EXTENDED RELEASE ORAL at 15:24

## 2019-11-07 RX ADMIN — TRIAMCINOLONE ACETONIDE: 1 CREAM TOPICAL at 09:18

## 2019-11-07 RX ADMIN — GABAPENTIN 200 MG: 100 CAPSULE ORAL at 15:24

## 2019-11-07 RX ADMIN — OXYCODONE HYDROCHLORIDE AND ACETAMINOPHEN 2 TABLET: 5; 325 TABLET ORAL at 04:12

## 2019-11-07 RX ADMIN — SUCRALFATE 1 G: 1 TABLET ORAL at 06:49

## 2019-11-07 RX ADMIN — ACETAMINOPHEN 650 MG: 325 TABLET ORAL at 15:29

## 2019-11-07 RX ADMIN — SUCRALFATE 1 G: 1 TABLET ORAL at 15:25

## 2019-11-07 RX ADMIN — OXYCODONE HYDROCHLORIDE AND ACETAMINOPHEN 2 TABLET: 5; 325 TABLET ORAL at 22:36

## 2019-11-07 RX ADMIN — OXYCODONE HYDROCHLORIDE AND ACETAMINOPHEN 2 TABLET: 5; 325 TABLET ORAL at 13:08

## 2019-11-07 RX ADMIN — GABAPENTIN 200 MG: 100 CAPSULE ORAL at 22:41

## 2019-11-07 RX ADMIN — POLYETHYLENE GLYCOL 3350 17 G: 17 POWDER, FOR SOLUTION ORAL at 09:17

## 2019-11-07 RX ADMIN — PANTOPRAZOLE SODIUM 40 MG: 40 TABLET, DELAYED RELEASE ORAL at 18:51

## 2019-11-07 ASSESSMENT — PAIN SCALES - GENERAL
PAINLEVEL_OUTOF10: 10
PAINLEVEL_OUTOF10: 8
PAINLEVEL_OUTOF10: 6
PAINLEVEL_OUTOF10: 9
PAINLEVEL_OUTOF10: 10
PAINLEVEL_OUTOF10: 8
PAINLEVEL_OUTOF10: 4
PAINLEVEL_OUTOF10: 7
PAINLEVEL_OUTOF10: 10
PAINLEVEL_OUTOF10: 4

## 2019-11-07 ASSESSMENT — PAIN DESCRIPTION - PAIN TYPE
TYPE: SURGICAL PAIN;CHRONIC PAIN
TYPE: ACUTE PAIN;SURGICAL PAIN

## 2019-11-07 ASSESSMENT — PAIN DESCRIPTION - ONSET: ONSET: ON-GOING

## 2019-11-07 ASSESSMENT — PAIN DESCRIPTION - PROGRESSION
CLINICAL_PROGRESSION: NOT CHANGED

## 2019-11-07 ASSESSMENT — PAIN DESCRIPTION - ORIENTATION: ORIENTATION: LOWER

## 2019-11-07 ASSESSMENT — PAIN DESCRIPTION - LOCATION
LOCATION: BACK
LOCATION: BACK

## 2019-11-07 ASSESSMENT — PAIN DESCRIPTION - DESCRIPTORS: DESCRIPTORS: ACHING

## 2019-11-07 ASSESSMENT — PAIN - FUNCTIONAL ASSESSMENT: PAIN_FUNCTIONAL_ASSESSMENT: PREVENTS OR INTERFERES SOME ACTIVE ACTIVITIES AND ADLS

## 2019-11-07 ASSESSMENT — PAIN DESCRIPTION - FREQUENCY: FREQUENCY: CONTINUOUS

## 2019-11-08 LAB
BASOPHILS # BLD: 0.4 %
BASOPHILS ABSOLUTE: 0 THOU/MM3 (ref 0–0.1)
EOSINOPHIL # BLD: 1.6 %
EOSINOPHILS ABSOLUTE: 0.2 THOU/MM3 (ref 0–0.4)
ERYTHROCYTE [DISTWIDTH] IN BLOOD BY AUTOMATED COUNT: 13.9 % (ref 11.5–14.5)
ERYTHROCYTE [DISTWIDTH] IN BLOOD BY AUTOMATED COUNT: 48.2 FL (ref 35–45)
HCT VFR BLD CALC: 25.3 % (ref 37–47)
HCT VFR BLD CALC: 26.5 % (ref 37–47)
HEMOGLOBIN: 7.8 GM/DL (ref 12–16)
HEMOGLOBIN: 8.5 GM/DL (ref 12–16)
IMMATURE GRANS (ABS): 0.04 THOU/MM3 (ref 0–0.07)
IMMATURE GRANULOCYTES: 0.4 %
LYMPHOCYTES # BLD: 6.2 %
LYMPHOCYTES ABSOLUTE: 0.6 THOU/MM3 (ref 1–4.8)
MCH RBC QN AUTO: 29.2 PG (ref 26–33)
MCHC RBC AUTO-ENTMCNC: 30.8 GM/DL (ref 32.2–35.5)
MCV RBC AUTO: 94.8 FL (ref 81–99)
MONOCYTES # BLD: 10.5 %
MONOCYTES ABSOLUTE: 1 THOU/MM3 (ref 0.4–1.3)
NUCLEATED RED BLOOD CELLS: 0 /100 WBC
PLATELET # BLD: 243 THOU/MM3 (ref 130–400)
PMV BLD AUTO: 10.5 FL (ref 9.4–12.4)
RBC # BLD: 2.67 MILL/MM3 (ref 4.2–5.4)
SEG NEUTROPHILS: 80.9 %
SEGMENTED NEUTROPHILS ABSOLUTE COUNT: 7.9 THOU/MM3 (ref 1.8–7.7)
WBC # BLD: 9.8 THOU/MM3 (ref 4.8–10.8)

## 2019-11-08 PROCEDURE — 36430 TRANSFUSION BLD/BLD COMPNT: CPT

## 2019-11-08 PROCEDURE — 97110 THERAPEUTIC EXERCISES: CPT

## 2019-11-08 PROCEDURE — 6370000000 HC RX 637 (ALT 250 FOR IP): Performed by: PHYSICIAN ASSISTANT

## 2019-11-08 PROCEDURE — 36415 COLL VENOUS BLD VENIPUNCTURE: CPT

## 2019-11-08 PROCEDURE — 1200000000 HC SEMI PRIVATE

## 2019-11-08 PROCEDURE — 85014 HEMATOCRIT: CPT

## 2019-11-08 PROCEDURE — 85025 COMPLETE CBC W/AUTO DIFF WBC: CPT

## 2019-11-08 PROCEDURE — 2580000003 HC RX 258: Performed by: PHYSICIAN ASSISTANT

## 2019-11-08 PROCEDURE — 97116 GAIT TRAINING THERAPY: CPT

## 2019-11-08 PROCEDURE — P9016 RBC LEUKOCYTES REDUCED: HCPCS

## 2019-11-08 PROCEDURE — 85018 HEMOGLOBIN: CPT

## 2019-11-08 RX ORDER — 0.9 % SODIUM CHLORIDE 0.9 %
250 INTRAVENOUS SOLUTION INTRAVENOUS ONCE
Status: COMPLETED | OUTPATIENT
Start: 2019-11-08 | End: 2019-11-08

## 2019-11-08 RX ADMIN — SUCRALFATE 1 G: 1 TABLET ORAL at 20:06

## 2019-11-08 RX ADMIN — OXYCODONE HYDROCHLORIDE AND ACETAMINOPHEN 2 TABLET: 5; 325 TABLET ORAL at 13:36

## 2019-11-08 RX ADMIN — DOCUSATE SODIUM 100 MG: 100 CAPSULE, LIQUID FILLED ORAL at 09:15

## 2019-11-08 RX ADMIN — SUCRALFATE 1 G: 1 TABLET ORAL at 05:24

## 2019-11-08 RX ADMIN — POLYETHYLENE GLYCOL 3350 17 G: 17 POWDER, FOR SOLUTION ORAL at 09:26

## 2019-11-08 RX ADMIN — OXYCODONE HYDROCHLORIDE AND ACETAMINOPHEN 2 TABLET: 5; 325 TABLET ORAL at 23:15

## 2019-11-08 RX ADMIN — GABAPENTIN 200 MG: 100 CAPSULE ORAL at 09:15

## 2019-11-08 RX ADMIN — GABAPENTIN 200 MG: 100 CAPSULE ORAL at 15:36

## 2019-11-08 RX ADMIN — CYCLOBENZAPRINE 5 MG: 10 TABLET, FILM COATED ORAL at 20:05

## 2019-11-08 RX ADMIN — FUROSEMIDE 40 MG: 40 TABLET ORAL at 09:15

## 2019-11-08 RX ADMIN — DULOXETINE HYDROCHLORIDE 60 MG: 60 CAPSULE, DELAYED RELEASE ORAL at 20:07

## 2019-11-08 RX ADMIN — DULOXETINE HYDROCHLORIDE 30 MG: 30 CAPSULE, DELAYED RELEASE ORAL at 15:37

## 2019-11-08 RX ADMIN — PANTOPRAZOLE SODIUM 40 MG: 40 TABLET, DELAYED RELEASE ORAL at 09:15

## 2019-11-08 RX ADMIN — NALOXEGOL OXALATE 12.5 MG: 12.5 TABLET, FILM COATED ORAL at 09:15

## 2019-11-08 RX ADMIN — GABAPENTIN 200 MG: 100 CAPSULE ORAL at 20:06

## 2019-11-08 RX ADMIN — BUPROPION HYDROCHLORIDE 200 MG: 100 TABLET, FILM COATED, EXTENDED RELEASE ORAL at 09:14

## 2019-11-08 RX ADMIN — PANTOPRAZOLE SODIUM 40 MG: 40 TABLET, DELAYED RELEASE ORAL at 16:52

## 2019-11-08 RX ADMIN — TRIAMCINOLONE ACETONIDE: 1 CREAM TOPICAL at 09:27

## 2019-11-08 RX ADMIN — LEVOTHYROXINE SODIUM 50 MCG: 50 TABLET ORAL at 05:24

## 2019-11-08 RX ADMIN — OXYCODONE HYDROCHLORIDE AND ACETAMINOPHEN 2 TABLET: 5; 325 TABLET ORAL at 17:57

## 2019-11-08 RX ADMIN — POTASSIUM CHLORIDE 10 MEQ: 750 TABLET, EXTENDED RELEASE ORAL at 09:14

## 2019-11-08 RX ADMIN — DOCUSATE SODIUM 100 MG: 100 CAPSULE, LIQUID FILLED ORAL at 20:05

## 2019-11-08 RX ADMIN — SUCRALFATE 1 G: 1 TABLET ORAL at 16:52

## 2019-11-08 RX ADMIN — POTASSIUM CHLORIDE 10 MEQ: 750 TABLET, EXTENDED RELEASE ORAL at 20:05

## 2019-11-08 RX ADMIN — OXYCODONE HYDROCHLORIDE AND ACETAMINOPHEN 2 TABLET: 5; 325 TABLET ORAL at 09:14

## 2019-11-08 RX ADMIN — SODIUM CHLORIDE 250 ML: 9 INJECTION, SOLUTION INTRAVENOUS at 16:21

## 2019-11-08 RX ADMIN — OXYCODONE HYDROCHLORIDE AND ACETAMINOPHEN 2 TABLET: 5; 325 TABLET ORAL at 05:25

## 2019-11-08 ASSESSMENT — PAIN DESCRIPTION - PAIN TYPE
TYPE: SURGICAL PAIN
TYPE: ACUTE PAIN;SURGICAL PAIN

## 2019-11-08 ASSESSMENT — PAIN DESCRIPTION - PROGRESSION
CLINICAL_PROGRESSION: NOT CHANGED

## 2019-11-08 ASSESSMENT — PAIN DESCRIPTION - ONSET
ONSET: ON-GOING
ONSET: ON-GOING

## 2019-11-08 ASSESSMENT — PAIN DESCRIPTION - DESCRIPTORS
DESCRIPTORS: ACHING
DESCRIPTORS: SHOOTING

## 2019-11-08 ASSESSMENT — PAIN SCALES - GENERAL
PAINLEVEL_OUTOF10: 8
PAINLEVEL_OUTOF10: 7
PAINLEVEL_OUTOF10: 8
PAINLEVEL_OUTOF10: 7
PAINLEVEL_OUTOF10: 7
PAINLEVEL_OUTOF10: 5
PAINLEVEL_OUTOF10: 8
PAINLEVEL_OUTOF10: 6
PAINLEVEL_OUTOF10: 7

## 2019-11-08 ASSESSMENT — PAIN DESCRIPTION - FREQUENCY
FREQUENCY: CONTINUOUS
FREQUENCY: CONTINUOUS

## 2019-11-08 ASSESSMENT — PAIN DESCRIPTION - LOCATION
LOCATION: BACK
LOCATION: BACK

## 2019-11-08 ASSESSMENT — PAIN DESCRIPTION - ORIENTATION
ORIENTATION: UPPER;LOWER
ORIENTATION: LOWER

## 2019-11-08 ASSESSMENT — PAIN - FUNCTIONAL ASSESSMENT
PAIN_FUNCTIONAL_ASSESSMENT: PREVENTS OR INTERFERES SOME ACTIVE ACTIVITIES AND ADLS
PAIN_FUNCTIONAL_ASSESSMENT: PREVENTS OR INTERFERES WITH ALL ACTIVE AND SOME PASSIVE ACTIVITIES

## 2019-11-09 LAB
BASOPHILS # BLD: 0.6 %
BASOPHILS ABSOLUTE: 0 THOU/MM3 (ref 0–0.1)
EOSINOPHIL # BLD: 3.5 %
EOSINOPHILS ABSOLUTE: 0.2 THOU/MM3 (ref 0–0.4)
ERYTHROCYTE [DISTWIDTH] IN BLOOD BY AUTOMATED COUNT: 14.2 % (ref 11.5–14.5)
ERYTHROCYTE [DISTWIDTH] IN BLOOD BY AUTOMATED COUNT: 48.3 FL (ref 35–45)
HCT VFR BLD CALC: 25.9 % (ref 37–47)
HEMOGLOBIN: 8.2 GM/DL (ref 12–16)
IMMATURE GRANS (ABS): 0.02 THOU/MM3 (ref 0–0.07)
IMMATURE GRANULOCYTES: 0.3 %
LYMPHOCYTES # BLD: 12.3 %
LYMPHOCYTES ABSOLUTE: 0.8 THOU/MM3 (ref 1–4.8)
MCH RBC QN AUTO: 29.4 PG (ref 26–33)
MCHC RBC AUTO-ENTMCNC: 31.7 GM/DL (ref 32.2–35.5)
MCV RBC AUTO: 92.8 FL (ref 81–99)
MONOCYTES # BLD: 11.3 %
MONOCYTES ABSOLUTE: 0.8 THOU/MM3 (ref 0.4–1.3)
NUCLEATED RED BLOOD CELLS: 0 /100 WBC
PLATELET # BLD: 247 THOU/MM3 (ref 130–400)
PMV BLD AUTO: 10.5 FL (ref 9.4–12.4)
RBC # BLD: 2.79 MILL/MM3 (ref 4.2–5.4)
SEG NEUTROPHILS: 72 %
SEGMENTED NEUTROPHILS ABSOLUTE COUNT: 4.9 THOU/MM3 (ref 1.8–7.7)
WBC # BLD: 6.8 THOU/MM3 (ref 4.8–10.8)

## 2019-11-09 PROCEDURE — 85025 COMPLETE CBC W/AUTO DIFF WBC: CPT

## 2019-11-09 PROCEDURE — 97110 THERAPEUTIC EXERCISES: CPT

## 2019-11-09 PROCEDURE — 2580000003 HC RX 258: Performed by: PHYSICIAN ASSISTANT

## 2019-11-09 PROCEDURE — 36415 COLL VENOUS BLD VENIPUNCTURE: CPT

## 2019-11-09 PROCEDURE — 1200000000 HC SEMI PRIVATE

## 2019-11-09 PROCEDURE — 97116 GAIT TRAINING THERAPY: CPT

## 2019-11-09 PROCEDURE — 94760 N-INVAS EAR/PLS OXIMETRY 1: CPT

## 2019-11-09 PROCEDURE — 6370000000 HC RX 637 (ALT 250 FOR IP): Performed by: PHYSICIAN ASSISTANT

## 2019-11-09 RX ADMIN — DOCUSATE SODIUM 100 MG: 100 CAPSULE, LIQUID FILLED ORAL at 20:06

## 2019-11-09 RX ADMIN — GABAPENTIN 200 MG: 100 CAPSULE ORAL at 12:47

## 2019-11-09 RX ADMIN — CYCLOBENZAPRINE 5 MG: 10 TABLET, FILM COATED ORAL at 20:05

## 2019-11-09 RX ADMIN — GABAPENTIN 200 MG: 100 CAPSULE ORAL at 20:05

## 2019-11-09 RX ADMIN — DOCUSATE SODIUM 100 MG: 100 CAPSULE, LIQUID FILLED ORAL at 08:53

## 2019-11-09 RX ADMIN — TRIAMCINOLONE ACETONIDE: 1 CREAM TOPICAL at 08:57

## 2019-11-09 RX ADMIN — CHLORDIAZEPOXIDE HYDROCHLORIDE 5 MG: 5 CAPSULE ORAL at 11:54

## 2019-11-09 RX ADMIN — PANTOPRAZOLE SODIUM 40 MG: 40 TABLET, DELAYED RELEASE ORAL at 17:02

## 2019-11-09 RX ADMIN — OXYCODONE HYDROCHLORIDE AND ACETAMINOPHEN 2 TABLET: 5; 325 TABLET ORAL at 16:50

## 2019-11-09 RX ADMIN — SUCRALFATE 1 G: 1 TABLET ORAL at 08:57

## 2019-11-09 RX ADMIN — SUCRALFATE 1 G: 1 TABLET ORAL at 17:02

## 2019-11-09 RX ADMIN — MAGNESIUM HYDROXIDE 30 ML: 400 SUSPENSION ORAL at 16:50

## 2019-11-09 RX ADMIN — SUCRALFATE 1 G: 1 TABLET ORAL at 12:46

## 2019-11-09 RX ADMIN — PANTOPRAZOLE SODIUM 40 MG: 40 TABLET, DELAYED RELEASE ORAL at 08:53

## 2019-11-09 RX ADMIN — OXYCODONE HYDROCHLORIDE AND ACETAMINOPHEN 2 TABLET: 5; 325 TABLET ORAL at 08:53

## 2019-11-09 RX ADMIN — OXYCODONE HYDROCHLORIDE AND ACETAMINOPHEN 2 TABLET: 5; 325 TABLET ORAL at 03:15

## 2019-11-09 RX ADMIN — POTASSIUM CHLORIDE 10 MEQ: 750 TABLET, EXTENDED RELEASE ORAL at 20:06

## 2019-11-09 RX ADMIN — DULOXETINE HYDROCHLORIDE 30 MG: 30 CAPSULE, DELAYED RELEASE ORAL at 16:50

## 2019-11-09 RX ADMIN — FUROSEMIDE 40 MG: 40 TABLET ORAL at 08:55

## 2019-11-09 RX ADMIN — NALOXEGOL OXALATE 12.5 MG: 12.5 TABLET, FILM COATED ORAL at 08:55

## 2019-11-09 RX ADMIN — POTASSIUM CHLORIDE 10 MEQ: 750 TABLET, EXTENDED RELEASE ORAL at 08:53

## 2019-11-09 RX ADMIN — Medication 10 ML: at 20:06

## 2019-11-09 RX ADMIN — BUPROPION HYDROCHLORIDE 200 MG: 100 TABLET, FILM COATED, EXTENDED RELEASE ORAL at 08:57

## 2019-11-09 RX ADMIN — POLYETHYLENE GLYCOL 3350 17 G: 17 POWDER, FOR SOLUTION ORAL at 08:53

## 2019-11-09 RX ADMIN — DULOXETINE HYDROCHLORIDE 60 MG: 60 CAPSULE, DELAYED RELEASE ORAL at 20:05

## 2019-11-09 RX ADMIN — Medication 10 ML: at 09:04

## 2019-11-09 RX ADMIN — GABAPENTIN 200 MG: 100 CAPSULE ORAL at 08:56

## 2019-11-09 RX ADMIN — OXYCODONE HYDROCHLORIDE AND ACETAMINOPHEN 2 TABLET: 5; 325 TABLET ORAL at 12:47

## 2019-11-09 RX ADMIN — LEVOTHYROXINE SODIUM 50 MCG: 50 TABLET ORAL at 08:56

## 2019-11-09 ASSESSMENT — PAIN DESCRIPTION - PROGRESSION
CLINICAL_PROGRESSION: NOT CHANGED
CLINICAL_PROGRESSION: NOT CHANGED
CLINICAL_PROGRESSION: GRADUALLY IMPROVING
CLINICAL_PROGRESSION: NOT CHANGED
CLINICAL_PROGRESSION: GRADUALLY IMPROVING
CLINICAL_PROGRESSION: GRADUALLY IMPROVING
CLINICAL_PROGRESSION: NOT CHANGED
CLINICAL_PROGRESSION: GRADUALLY IMPROVING
CLINICAL_PROGRESSION: GRADUALLY IMPROVING
CLINICAL_PROGRESSION: NOT CHANGED

## 2019-11-09 ASSESSMENT — PAIN DESCRIPTION - ONSET
ONSET: ON-GOING

## 2019-11-09 ASSESSMENT — PAIN DESCRIPTION - DESCRIPTORS
DESCRIPTORS: SHOOTING
DESCRIPTORS: SHOOTING;SHARP
DESCRIPTORS: SHOOTING

## 2019-11-09 ASSESSMENT — PAIN SCALES - GENERAL
PAINLEVEL_OUTOF10: 9
PAINLEVEL_OUTOF10: 5
PAINLEVEL_OUTOF10: 8
PAINLEVEL_OUTOF10: 5
PAINLEVEL_OUTOF10: 7
PAINLEVEL_OUTOF10: 7
PAINLEVEL_OUTOF10: 9

## 2019-11-09 ASSESSMENT — PAIN DESCRIPTION - PAIN TYPE
TYPE: SURGICAL PAIN

## 2019-11-09 ASSESSMENT — PAIN DESCRIPTION - FREQUENCY
FREQUENCY: CONTINUOUS

## 2019-11-09 ASSESSMENT — PAIN DESCRIPTION - ORIENTATION
ORIENTATION: UPPER;LOWER

## 2019-11-09 ASSESSMENT — PAIN - FUNCTIONAL ASSESSMENT
PAIN_FUNCTIONAL_ASSESSMENT: PREVENTS OR INTERFERES SOME ACTIVE ACTIVITIES AND ADLS
PAIN_FUNCTIONAL_ASSESSMENT: PREVENTS OR INTERFERES SOME ACTIVE ACTIVITIES AND ADLS
PAIN_FUNCTIONAL_ASSESSMENT: PREVENTS OR INTERFERES WITH ALL ACTIVE AND SOME PASSIVE ACTIVITIES

## 2019-11-09 ASSESSMENT — PAIN DESCRIPTION - LOCATION
LOCATION: BACK

## 2019-11-10 LAB
BASOPHILS # BLD: 0.7 %
BASOPHILS ABSOLUTE: 0 THOU/MM3 (ref 0–0.1)
EOSINOPHIL # BLD: 4.2 %
EOSINOPHILS ABSOLUTE: 0.2 THOU/MM3 (ref 0–0.4)
ERYTHROCYTE [DISTWIDTH] IN BLOOD BY AUTOMATED COUNT: 14.3 % (ref 11.5–14.5)
ERYTHROCYTE [DISTWIDTH] IN BLOOD BY AUTOMATED COUNT: 48.3 FL (ref 35–45)
HCT VFR BLD CALC: 29.1 % (ref 37–47)
HEMOGLOBIN: 9 GM/DL (ref 12–16)
IMMATURE GRANS (ABS): 0.02 THOU/MM3 (ref 0–0.07)
IMMATURE GRANULOCYTES: 0.4 %
LYMPHOCYTES # BLD: 17.4 %
LYMPHOCYTES ABSOLUTE: 1 THOU/MM3 (ref 1–4.8)
MCH RBC QN AUTO: 29 PG (ref 26–33)
MCHC RBC AUTO-ENTMCNC: 30.9 GM/DL (ref 32.2–35.5)
MCV RBC AUTO: 93.9 FL (ref 81–99)
MONOCYTES # BLD: 9.2 %
MONOCYTES ABSOLUTE: 0.5 THOU/MM3 (ref 0.4–1.3)
NUCLEATED RED BLOOD CELLS: 0 /100 WBC
PLATELET # BLD: 329 THOU/MM3 (ref 130–400)
PMV BLD AUTO: 9.9 FL (ref 9.4–12.4)
RBC # BLD: 3.1 MILL/MM3 (ref 4.2–5.4)
SEG NEUTROPHILS: 68.1 %
SEGMENTED NEUTROPHILS ABSOLUTE COUNT: 3.9 THOU/MM3 (ref 1.8–7.7)
WBC # BLD: 5.7 THOU/MM3 (ref 4.8–10.8)

## 2019-11-10 PROCEDURE — 85025 COMPLETE CBC W/AUTO DIFF WBC: CPT

## 2019-11-10 PROCEDURE — 36415 COLL VENOUS BLD VENIPUNCTURE: CPT

## 2019-11-10 PROCEDURE — 2580000003 HC RX 258: Performed by: PHYSICIAN ASSISTANT

## 2019-11-10 PROCEDURE — 97530 THERAPEUTIC ACTIVITIES: CPT

## 2019-11-10 PROCEDURE — 6370000000 HC RX 637 (ALT 250 FOR IP): Performed by: PHYSICIAN ASSISTANT

## 2019-11-10 PROCEDURE — 1200000000 HC SEMI PRIVATE

## 2019-11-10 PROCEDURE — 97535 SELF CARE MNGMENT TRAINING: CPT

## 2019-11-10 RX ADMIN — OXYCODONE HYDROCHLORIDE AND ACETAMINOPHEN 1 TABLET: 5; 325 TABLET ORAL at 00:04

## 2019-11-10 RX ADMIN — DULOXETINE HYDROCHLORIDE 60 MG: 60 CAPSULE, DELAYED RELEASE ORAL at 21:12

## 2019-11-10 RX ADMIN — Medication 10 ML: at 21:13

## 2019-11-10 RX ADMIN — GABAPENTIN 200 MG: 100 CAPSULE ORAL at 09:42

## 2019-11-10 RX ADMIN — TRIAMCINOLONE ACETONIDE: 1 CREAM TOPICAL at 09:50

## 2019-11-10 RX ADMIN — DULOXETINE HYDROCHLORIDE 30 MG: 30 CAPSULE, DELAYED RELEASE ORAL at 13:39

## 2019-11-10 RX ADMIN — DOCUSATE SODIUM 100 MG: 100 CAPSULE, LIQUID FILLED ORAL at 21:13

## 2019-11-10 RX ADMIN — PANTOPRAZOLE SODIUM 40 MG: 40 TABLET, DELAYED RELEASE ORAL at 16:59

## 2019-11-10 RX ADMIN — OXYCODONE HYDROCHLORIDE AND ACETAMINOPHEN 1 TABLET: 5; 325 TABLET ORAL at 21:13

## 2019-11-10 RX ADMIN — BUPROPION HYDROCHLORIDE 200 MG: 100 TABLET, FILM COATED, EXTENDED RELEASE ORAL at 09:43

## 2019-11-10 RX ADMIN — Medication 10 ML: at 09:49

## 2019-11-10 RX ADMIN — POTASSIUM CHLORIDE 10 MEQ: 750 TABLET, EXTENDED RELEASE ORAL at 21:13

## 2019-11-10 RX ADMIN — DOCUSATE SODIUM 100 MG: 100 CAPSULE, LIQUID FILLED ORAL at 09:43

## 2019-11-10 RX ADMIN — LEVOTHYROXINE SODIUM 50 MCG: 50 TABLET ORAL at 04:59

## 2019-11-10 RX ADMIN — BISACODYL 10 MG: 10 SUPPOSITORY RECTAL at 05:00

## 2019-11-10 RX ADMIN — OXYCODONE HYDROCHLORIDE AND ACETAMINOPHEN 2 TABLET: 5; 325 TABLET ORAL at 04:56

## 2019-11-10 RX ADMIN — OXYCODONE HYDROCHLORIDE AND ACETAMINOPHEN 2 TABLET: 5; 325 TABLET ORAL at 13:39

## 2019-11-10 RX ADMIN — POLYETHYLENE GLYCOL 3350 17 G: 17 POWDER, FOR SOLUTION ORAL at 09:43

## 2019-11-10 RX ADMIN — OXYCODONE HYDROCHLORIDE AND ACETAMINOPHEN 2 TABLET: 5; 325 TABLET ORAL at 16:59

## 2019-11-10 RX ADMIN — POTASSIUM CHLORIDE 10 MEQ: 750 TABLET, EXTENDED RELEASE ORAL at 09:43

## 2019-11-10 RX ADMIN — SUCRALFATE 1 G: 1 TABLET ORAL at 09:46

## 2019-11-10 RX ADMIN — OXYCODONE HYDROCHLORIDE AND ACETAMINOPHEN 2 TABLET: 5; 325 TABLET ORAL at 09:42

## 2019-11-10 RX ADMIN — SUCRALFATE 1 G: 1 TABLET ORAL at 17:00

## 2019-11-10 RX ADMIN — FUROSEMIDE 40 MG: 40 TABLET ORAL at 09:42

## 2019-11-10 RX ADMIN — SUCRALFATE 1 G: 1 TABLET ORAL at 04:59

## 2019-11-10 RX ADMIN — GABAPENTIN 200 MG: 100 CAPSULE ORAL at 13:39

## 2019-11-10 RX ADMIN — PANTOPRAZOLE SODIUM 40 MG: 40 TABLET, DELAYED RELEASE ORAL at 04:55

## 2019-11-10 RX ADMIN — GABAPENTIN 200 MG: 100 CAPSULE ORAL at 21:12

## 2019-11-10 RX ADMIN — NALOXEGOL OXALATE 12.5 MG: 12.5 TABLET, FILM COATED ORAL at 04:59

## 2019-11-10 ASSESSMENT — PAIN DESCRIPTION - PROGRESSION
CLINICAL_PROGRESSION: GRADUALLY IMPROVING
CLINICAL_PROGRESSION: GRADUALLY WORSENING
CLINICAL_PROGRESSION: GRADUALLY IMPROVING

## 2019-11-10 ASSESSMENT — PAIN DESCRIPTION - DESCRIPTORS
DESCRIPTORS: ACHING;DISCOMFORT
DESCRIPTORS: ACHING

## 2019-11-10 ASSESSMENT — PAIN DESCRIPTION - ONSET
ONSET: ON-GOING
ONSET: ON-GOING

## 2019-11-10 ASSESSMENT — PAIN - FUNCTIONAL ASSESSMENT
PAIN_FUNCTIONAL_ASSESSMENT: PREVENTS OR INTERFERES SOME ACTIVE ACTIVITIES AND ADLS
PAIN_FUNCTIONAL_ASSESSMENT: PREVENTS OR INTERFERES SOME ACTIVE ACTIVITIES AND ADLS

## 2019-11-10 ASSESSMENT — PAIN DESCRIPTION - FREQUENCY
FREQUENCY: CONTINUOUS
FREQUENCY: CONTINUOUS

## 2019-11-10 ASSESSMENT — PAIN SCALES - GENERAL
PAINLEVEL_OUTOF10: 4
PAINLEVEL_OUTOF10: 9
PAINLEVEL_OUTOF10: 8
PAINLEVEL_OUTOF10: 8
PAINLEVEL_OUTOF10: 4
PAINLEVEL_OUTOF10: 4
PAINLEVEL_OUTOF10: 6
PAINLEVEL_OUTOF10: 7
PAINLEVEL_OUTOF10: 9
PAINLEVEL_OUTOF10: 6

## 2019-11-10 ASSESSMENT — PAIN DESCRIPTION - PAIN TYPE
TYPE: SURGICAL PAIN
TYPE: SURGICAL PAIN

## 2019-11-10 ASSESSMENT — PAIN DESCRIPTION - LOCATION
LOCATION: BACK
LOCATION: BACK

## 2019-11-10 ASSESSMENT — PAIN DESCRIPTION - ORIENTATION
ORIENTATION: UPPER;LOWER
ORIENTATION: UPPER;LOWER

## 2019-11-11 VITALS
TEMPERATURE: 97.6 F | WEIGHT: 129.6 LBS | DIASTOLIC BLOOD PRESSURE: 58 MMHG | RESPIRATION RATE: 18 BRPM | OXYGEN SATURATION: 98 % | HEIGHT: 63 IN | HEART RATE: 90 BPM | SYSTOLIC BLOOD PRESSURE: 116 MMHG | BODY MASS INDEX: 22.96 KG/M2

## 2019-11-11 PROCEDURE — 97110 THERAPEUTIC EXERCISES: CPT

## 2019-11-11 PROCEDURE — 6370000000 HC RX 637 (ALT 250 FOR IP): Performed by: PHYSICIAN ASSISTANT

## 2019-11-11 PROCEDURE — 2580000003 HC RX 258: Performed by: PHYSICIAN ASSISTANT

## 2019-11-11 PROCEDURE — 97116 GAIT TRAINING THERAPY: CPT

## 2019-11-11 RX ORDER — CYCLOBENZAPRINE HCL 10 MG
10 TABLET ORAL 3 TIMES DAILY PRN
Qty: 50 TABLET | Refills: 0 | Status: SHIPPED | OUTPATIENT
Start: 2019-11-11 | End: 2019-11-21

## 2019-11-11 RX ORDER — OXYCODONE HYDROCHLORIDE AND ACETAMINOPHEN 5; 325 MG/1; MG/1
1 TABLET ORAL EVERY 4 HOURS PRN
Qty: 42 TABLET | Refills: 0 | Status: SHIPPED | OUTPATIENT
Start: 2019-11-11 | End: 2019-11-18

## 2019-11-11 RX ADMIN — OXYCODONE HYDROCHLORIDE AND ACETAMINOPHEN 1 TABLET: 5; 325 TABLET ORAL at 05:17

## 2019-11-11 RX ADMIN — BUPROPION HYDROCHLORIDE 200 MG: 100 TABLET, FILM COATED, EXTENDED RELEASE ORAL at 09:05

## 2019-11-11 RX ADMIN — Medication 10 ML: at 09:06

## 2019-11-11 RX ADMIN — LEVOTHYROXINE SODIUM 50 MCG: 50 TABLET ORAL at 05:16

## 2019-11-11 RX ADMIN — OXYCODONE HYDROCHLORIDE AND ACETAMINOPHEN 1 TABLET: 5; 325 TABLET ORAL at 09:06

## 2019-11-11 RX ADMIN — NALOXEGOL OXALATE 12.5 MG: 12.5 TABLET, FILM COATED ORAL at 09:06

## 2019-11-11 RX ADMIN — GABAPENTIN 200 MG: 100 CAPSULE ORAL at 09:05

## 2019-11-11 RX ADMIN — ACETAMINOPHEN 325 MG: 325 TABLET ORAL at 05:17

## 2019-11-11 RX ADMIN — POTASSIUM CHLORIDE 10 MEQ: 750 TABLET, EXTENDED RELEASE ORAL at 09:05

## 2019-11-11 RX ADMIN — PANTOPRAZOLE SODIUM 40 MG: 40 TABLET, DELAYED RELEASE ORAL at 05:17

## 2019-11-11 RX ADMIN — SUCRALFATE 1 G: 1 TABLET ORAL at 05:16

## 2019-11-11 RX ADMIN — OXYCODONE HYDROCHLORIDE AND ACETAMINOPHEN 1 TABLET: 5; 325 TABLET ORAL at 13:03

## 2019-11-11 ASSESSMENT — PAIN DESCRIPTION - PROGRESSION: CLINICAL_PROGRESSION: NOT CHANGED

## 2019-11-11 ASSESSMENT — PAIN SCALES - GENERAL
PAINLEVEL_OUTOF10: 6
PAINLEVEL_OUTOF10: 4

## 2019-11-11 ASSESSMENT — PAIN DESCRIPTION - PAIN TYPE: TYPE: ACUTE PAIN;SURGICAL PAIN

## 2019-11-11 ASSESSMENT — PAIN DESCRIPTION - ORIENTATION: ORIENTATION: LOWER;MID

## 2019-11-11 ASSESSMENT — PAIN DESCRIPTION - LOCATION: LOCATION: BACK

## 2019-11-11 ASSESSMENT — PAIN DESCRIPTION - FREQUENCY: FREQUENCY: CONTINUOUS

## 2019-11-11 ASSESSMENT — PAIN DESCRIPTION - DESCRIPTORS: DESCRIPTORS: ACHING

## 2019-11-11 ASSESSMENT — PAIN - FUNCTIONAL ASSESSMENT: PAIN_FUNCTIONAL_ASSESSMENT: ACTIVITIES ARE NOT PREVENTED

## 2019-11-11 ASSESSMENT — PAIN DESCRIPTION - ONSET: ONSET: ON-GOING

## 2020-10-01 RX ORDER — GABAPENTIN 300 MG/1
300 CAPSULE ORAL 3 TIMES DAILY
COMMUNITY
Start: 2020-08-18

## 2020-10-01 RX ORDER — ALENDRONATE SODIUM 70 MG/1
70 TABLET ORAL
COMMUNITY

## 2020-10-01 NOTE — PROGRESS NOTES
Following instructions given to patient, who states understanding:     NPO after midnight  [employer] and 's license  Wear comfortable clean clothing  Do not bring jewelry   Shower night before and morning of surgery with a liquid antibacterial soap  Bring medications in original bottles  Follow all instructions given by your physician   needed at discharge  Call [phone] for any questions  Report to [facility] on 2nd floor  If you would become ill prior to surgery, please call the surgeon  May have only 1 visitor accompany you for surgery  Please bring and wear mask  You will be receiving a phone call one or two days before surgery to review covid screening exposure     Covid screening questionnaire complete and negative for symptoms or exposure see chart for documentation.      Pt going to [facility] for Covid test today 10/1/20 or 10/2/20    Please limit your exposure to the public after you have your covid test  Please call your doctor immediately if you develop any symptoms of covid prior to your surgery

## 2020-10-01 NOTE — PROGRESS NOTES
Preliminary Discharge Planning Questionnaire  Date of Surgery 10/7/20   Surgeon Yossi Kay      · Having the proper help and care after surgery is very important to your recovery. Who will be able to help you at home when you are discharged from the hospital? ([facility] - 24/7 for a minimum of 2 weeks)        Name(s) Lives Alone, son works  , Hoping to go someplace for [employer]    · How many steps to enter your home? 5   Mobile Home    · Bathroom on first floor? Yes    · Bedroom on the first floor? Yes    · Do you have an elevated toilet seat to use at home? Yes    · Do you have a walker to use at home? · Total Joints - with wheels N/A   · Spine - with wheels  Yes     Have you been doing home exercises? No , a lot of pain    *You will go home with some outpatient physical therapy, where do you prefer to go? Lives in [address] [place], [place] [address]If needed, what home health agency would you like to use?  [facility]    *Cardiac Rehab plans NA

## 2020-10-05 ENCOUNTER — HOSPITAL ENCOUNTER (OUTPATIENT)
Age: 75
Discharge: HOME | End: 2020-10-05
Payer: MEDICARE

## 2020-10-05 LAB — SARS-COV-2, NAAT: NOT DETECTED

## 2020-10-05 PROCEDURE — U0002 COVID-19 LAB TEST NON-CDC: HCPCS

## 2020-10-06 NOTE — PROGRESS NOTES
Patient contacted regarding COVID-19 screen. No answer at this time, message left asking for callback.

## 2020-10-07 ENCOUNTER — ANESTHESIA EVENT (OUTPATIENT)
Dept: OPERATING ROOM | Age: 75
End: 2020-10-07
Payer: MEDICARE

## 2020-10-07 ENCOUNTER — ANESTHESIA (OUTPATIENT)
Dept: OPERATING ROOM | Age: 75
End: 2020-10-07
Payer: MEDICARE

## 2020-10-07 ENCOUNTER — HOSPITAL ENCOUNTER (OUTPATIENT)
Age: 75
Setting detail: SURGERY ADMIT
Discharge: HOME | End: 2020-10-07
Attending: ORTHOPAEDIC SURGERY | Admitting: ORTHOPAEDIC SURGERY
Payer: MEDICARE

## 2020-10-07 VITALS
RESPIRATION RATE: 20 BRPM | OXYGEN SATURATION: 96 % | SYSTOLIC BLOOD PRESSURE: 209 MMHG | WEIGHT: 128.6 LBS | HEART RATE: 83 BPM | DIASTOLIC BLOOD PRESSURE: 80 MMHG | BODY MASS INDEX: 22.79 KG/M2 | HEIGHT: 63 IN | TEMPERATURE: 97.2 F

## 2020-10-07 LAB
ABO: NORMAL
ANTIBODY SCREEN: NORMAL
POTASSIUM SERPL-SCNC: 4.4 MEQ/L (ref 3.5–5.2)
RH FACTOR: NORMAL

## 2020-10-07 PROCEDURE — 86900 BLOOD TYPING SEROLOGIC ABO: CPT

## 2020-10-07 PROCEDURE — 84132 ASSAY OF SERUM POTASSIUM: CPT

## 2020-10-07 PROCEDURE — 86901 BLOOD TYPING SEROLOGIC RH(D): CPT

## 2020-10-07 PROCEDURE — 86850 RBC ANTIBODY SCREEN: CPT

## 2020-10-07 PROCEDURE — 36415 COLL VENOUS BLD VENIPUNCTURE: CPT

## 2020-10-07 PROCEDURE — 86923 COMPATIBILITY TEST ELECTRIC: CPT

## 2020-10-07 RX ORDER — SODIUM CHLORIDE 0.9 % (FLUSH) 0.9 %
10 SYRINGE (ML) INJECTION PRN
Status: DISCONTINUED | OUTPATIENT
Start: 2020-10-07 | End: 2020-10-07 | Stop reason: HOSPADM

## 2020-10-07 RX ORDER — SODIUM CHLORIDE 0.9 % (FLUSH) 0.9 %
10 SYRINGE (ML) INJECTION EVERY 12 HOURS SCHEDULED
Status: DISCONTINUED | OUTPATIENT
Start: 2020-10-07 | End: 2020-10-07 | Stop reason: HOSPADM

## 2020-10-07 RX ORDER — MORPHINE SULFATE 2 MG/ML
INJECTION, SOLUTION INTRAMUSCULAR; INTRAVENOUS
Status: DISCONTINUED
Start: 2020-10-07 | End: 2020-10-07 | Stop reason: HOSPADM

## 2020-10-07 RX ORDER — MORPHINE SULFATE 2 MG/ML
2 INJECTION, SOLUTION INTRAMUSCULAR; INTRAVENOUS ONCE
Status: DISCONTINUED | OUTPATIENT
Start: 2020-10-07 | End: 2020-10-07 | Stop reason: HOSPADM

## 2020-10-07 RX ORDER — OXYCODONE AND ACETAMINOPHEN 7.5; 325 MG/1; MG/1
1 TABLET ORAL EVERY 8 HOURS PRN
COMMUNITY

## 2020-10-07 ASSESSMENT — PAIN DESCRIPTION - ORIENTATION: ORIENTATION: LOWER

## 2020-10-07 ASSESSMENT — PAIN DESCRIPTION - DIRECTION: RADIATING_TOWARDS: RIGHT LEG

## 2020-10-07 ASSESSMENT — PAIN DESCRIPTION - PAIN TYPE: TYPE: CHRONIC PAIN

## 2020-10-07 ASSESSMENT — PAIN SCALES - GENERAL: PAINLEVEL_OUTOF10: 7

## 2020-10-07 ASSESSMENT — PAIN DESCRIPTION - LOCATION: LOCATION: BACK

## 2020-10-07 NOTE — PROGRESS NOTES
Patient admitted to [facility] room 6. Fall and allergy bands on. Josué SCDS on. Josué nasal swab completed. Patient and family oriented to room and call light.  Pain goal after surgery is 4/10

## 2020-10-07 NOTE — H&P
bupropion,  Percocet, lidocaine jelly ointment, ferrous sulfate, Carafate,  potassium, levothyroxine, and Lasix. PAST SURGICAL HISTORY:  Significant for L2-L5 lumbar laminectomy and  posterior spinal fusion with a removal of bladder stimulator completed  on 11/06/2019. SOCIAL HISTORY:  She is a nonsmoker and lives in a private home by  herself. REVIEW OF SYSTEMS:  GENERAL:  Denies fevers, fatigue or chills. CARDIOVASCULAR:  Denies chest pain, palpitations, or abnormal heart  beat. RESPIRATORY:  Denies shortness of breath or productive cough. GASTROINTESTINAL:  Denies nausea, vomiting, diarrhea or abdominal pain. GENITOURINARY:  Denies dysuria or bladder incontinence. MUSCULOSKELETAL:  Significant for low back pain. Denies neck pain,  muscle pain, joint pain or joint swelling. NEUROLOGIC:  Denies faintings, blackouts or headaches. PHYSICAL EXAMINATION:  VITAL SIGNS:  Most recently completed on 08/18/2020, blood pressure  136/92, height 5 feet 3 inches, weight 174 pounds, pulse 89 beats per  minutes, BMI 21.96. GENERAL:  The patient is calm, cooperative, alert, and oriented x3. Sitting in the exam room chair, in no acute distress. She requires the  use of a cane for ambulatory assistance and is currently wearing her LSO  brace. MUSCULOSKELETAL:  Examination of her lumbar spine demonstrates skin is  warm, dry, and intact with no open sores or lesions. There is a well  approximated, well healed midline scar consistent with her previous  lumbar spine surgery. There is no tenderness to palpation throughout  her lumbar spine including midline and bilateral paraspinal muscles. Examination of her bilateral lower extremities demonstrates, skin is  warm, dry and intact with no open sores or lesions. Calves are soft and  nontender with no clinical signs of DVT. Strength is 5/5 in bilateral  knee extension, dorsiflexion, EHL contraction and plantar flexion and  distal pulses are noted.   The patient is sensate throughout the lower  extremities. Straight leg raise is negative bilaterally. IMAGING:  CT of the lumbar spine without contrast completed at  [facility] dated 04/21/2020. Conclusion:  1. L2-L3 laminectomy and posterior spinal fusion; disk bulge and left  foraminal protrusion; contacting foraminal left L2 nerve root. Transpedicular screws bilaterally at L2 and left at L3 violating medial  pedicle cortices. Lateral left foraminal stenosis. 2.  L3-L4 laminectomy and posterior spinal fusion; fusion hardware with L4  right transpedicle screw predominately right lateral to L4 right  pedicle; with left L4 pedicle screw violating medial pedicle cortex,  slightly encroaching upon left lateral recess. Calcified disk bulge  with right greater than left foraminal extension contacts foraminal L3  nerve roots and descending L4 nerve roots. 3. L4-L5 laminectomy and posterior spinal fusion. Fusion hardware,  lucency associated with L5 transpedicle screws such as loosening. Low-grade anterolisthesis; listhesed disk extending right greater than  left foraminally encroaching upon foraminal right L4 nerve root. Moderate right foraminal stenosis. 4.  L1-L2 calcified disk bulge, asymmetric to left; contacting left  greater than right L2 and foraminal left L1 nerve roots, mild spinal  stenosis. Moderate left foraminal stenosis. LABORATORY DATA:  Dated 09/10/2020, hemoglobin 11.9, hematocrit 38. 4. MRSA screen is negative. COVID screen dated 10/05/2020 is negative. ASSESSMENT:  1. Spondylolisthesis, lumbar region. 2.  Lumbar pain due to bilateral L5 pedicle screw loosening status post  L2-L5 laminectomy and fusion completed 11/2019.     PLAN:  The plan moving forward for the patient is for her to undergo  surgery at [address] [place] [address] on 10/07/2020, where she will  undergo a removal of hardware from L2-L5 with extension of her fusion  into the S1 and pelvis bilaterally. CONCLUSIONS:  The patient is a 24-year-old female who is known to our  office having undergone an L2-L5 lumbar laminectomy and fusion completed  on 11/06/2019. She has been dealing with significant worsening lumbar  pain that has failed to improve despite conservative treatment  management of continued use of her LSO brace, pain management, pain  medications including Percocet, activity modifications without  significant relief of her symptoms. She was worked up with a CT scan of  her lumbar spine demonstrating bilateral L5 pedicle screw loosening with  associated spondylolisthesis of L4 over L5. Due to failed conservative  treatment management, she has elected to undergo surgical intervention. She has been surgically cleared by her PCP, Maurice Andrews CNP to  undergo surgery. We have received informed consent from the patient. We have gone over the risks and benefits of surgery including blood loss  requiring transfusion, hematoma, nerve root injury, nerve root  irritation, CSF leak, and difficulty with anesthesia and even infection. We have answered the questions and concerns to the patient's  satisfaction. Absolutely, no guarantees have been made as far as the  results of this surgery.         [facility], [place]    D: 10/06/2020 17:47:55       T: 10/06/2020 23:43:33     [email or web address]  Job#: [id number]     Doc:

## 2020-10-07 NOTE — PROGRESS NOTES
Patients blood pressure has been elevated since arrival due to pain and stress. Patient has no history of HTN. Instructed patient to follow up with primary care physician per Dr. Sherryle Bollard.

## 2025-01-23 ENCOUNTER — APPOINTMENT (OUTPATIENT)
Dept: ENDOSCOPY | Age: 80
DRG: 871 | End: 2025-01-23
Attending: INTERNAL MEDICINE
Payer: MEDICARE

## 2025-01-23 ENCOUNTER — ANESTHESIA EVENT (OUTPATIENT)
Dept: ENDOSCOPY | Age: 80
End: 2025-01-23
Payer: MEDICARE

## 2025-01-23 ENCOUNTER — APPOINTMENT (OUTPATIENT)
Dept: ULTRASOUND IMAGING | Age: 80
DRG: 871 | End: 2025-01-23
Attending: STUDENT IN AN ORGANIZED HEALTH CARE EDUCATION/TRAINING PROGRAM
Payer: MEDICARE

## 2025-01-23 ENCOUNTER — ANESTHESIA (OUTPATIENT)
Dept: ENDOSCOPY | Age: 80
End: 2025-01-23
Payer: MEDICARE

## 2025-01-23 ENCOUNTER — HOSPITAL ENCOUNTER (INPATIENT)
Age: 80
LOS: 13 days | Discharge: NURSING, REHAB OR LONG-TERM CARE | DRG: 871 | End: 2025-02-05
Attending: STUDENT IN AN ORGANIZED HEALTH CARE EDUCATION/TRAINING PROGRAM | Admitting: STUDENT IN AN ORGANIZED HEALTH CARE EDUCATION/TRAINING PROGRAM
Payer: MEDICARE

## 2025-01-23 ENCOUNTER — APPOINTMENT (OUTPATIENT)
Dept: GENERAL RADIOLOGY | Age: 80
DRG: 871 | End: 2025-01-23
Attending: STUDENT IN AN ORGANIZED HEALTH CARE EDUCATION/TRAINING PROGRAM
Payer: MEDICARE

## 2025-01-23 DIAGNOSIS — Z51.5 PALLIATIVE CARE PATIENT: Primary | ICD-10-CM

## 2025-01-23 DIAGNOSIS — E43 SEVERE MALNUTRITION (HCC): Chronic | ICD-10-CM

## 2025-01-23 DIAGNOSIS — M54.50 CHRONIC LOW BACK PAIN, UNSPECIFIED BACK PAIN LATERALITY, UNSPECIFIED WHETHER SCIATICA PRESENT: ICD-10-CM

## 2025-01-23 DIAGNOSIS — G89.29 CHRONIC LOW BACK PAIN, UNSPECIFIED BACK PAIN LATERALITY, UNSPECIFIED WHETHER SCIATICA PRESENT: ICD-10-CM

## 2025-01-23 PROBLEM — K92.2 UPPER GI BLEED: Status: ACTIVE | Noted: 2025-01-23

## 2025-01-23 LAB
ABO GROUP BLD: NORMAL
ALBUMIN SERPL BCG-MCNC: 2.4 G/DL (ref 3.5–5.1)
ALP SERPL-CCNC: 108 U/L (ref 38–126)
ALT SERPL W/O P-5'-P-CCNC: 10 U/L (ref 11–66)
ANION GAP SERPL CALC-SCNC: 9 MEQ/L (ref 8–16)
AST SERPL-CCNC: 24 U/L (ref 5–40)
BASOPHILS ABSOLUTE: 0 THOU/MM3 (ref 0–0.1)
BASOPHILS NFR BLD AUTO: 0.1 %
BILIRUB CONJ SERPL-MCNC: < 0.1 MG/DL (ref 0.1–13.8)
BILIRUB SERPL-MCNC: 0.2 MG/DL (ref 0.3–1.2)
BUN SERPL-MCNC: 14 MG/DL (ref 7–22)
CALCIUM SERPL-MCNC: 8.5 MG/DL (ref 8.5–10.5)
CHLORIDE SERPL-SCNC: 102 MEQ/L (ref 98–111)
CO2 SERPL-SCNC: 27 MEQ/L (ref 23–33)
CREAT SERPL-MCNC: 0.4 MG/DL (ref 0.4–1.2)
DEPRECATED RDW RBC AUTO: 43.8 FL (ref 35–45)
EKG ATRIAL RATE: 100 BPM
EKG P AXIS: 77 DEGREES
EKG P-R INTERVAL: 174 MS
EKG Q-T INTERVAL: 372 MS
EKG QRS DURATION: 86 MS
EKG QTC CALCULATION (BAZETT): 479 MS
EKG R AXIS: 48 DEGREES
EKG T AXIS: 68 DEGREES
EKG VENTRICULAR RATE: 100 BPM
EOSINOPHIL NFR BLD AUTO: 0.2 %
EOSINOPHILS ABSOLUTE: 0 THOU/MM3 (ref 0–0.4)
ERYTHROCYTE [DISTWIDTH] IN BLOOD BY AUTOMATED COUNT: 15.2 % (ref 11.5–14.5)
FERRITIN SERPL IA-MCNC: 199 NG/ML (ref 10–291)
GFR SERPL CREATININE-BSD FRML MDRD: > 90 ML/MIN/1.73M2
GLUCOSE SERPL-MCNC: 89 MG/DL (ref 70–108)
HCT VFR BLD AUTO: 24.7 % (ref 37–47)
HCT VFR BLD AUTO: 25.4 % (ref 37–47)
HCT VFR BLD AUTO: 25.6 % (ref 37–47)
HGB BLD-MCNC: 8.1 GM/DL (ref 12–16)
HGB BLD-MCNC: 8.1 GM/DL (ref 12–16)
HGB BLD-MCNC: 8.3 GM/DL (ref 12–16)
IAT IGG-SP REAG SERPL QL: NORMAL
IMM GRANULOCYTES # BLD AUTO: 0.06 THOU/MM3 (ref 0–0.07)
IMM GRANULOCYTES NFR BLD AUTO: 0.4 %
IRON SATN MFR SERPL: 24 % (ref 20–50)
IRON SERPL-MCNC: 27 UG/DL (ref 50–170)
LACTATE SERPL-SCNC: 1 MMOL/L (ref 0.5–2)
LACTATE SERPL-SCNC: 1.1 MMOL/L (ref 0.5–2)
LIPASE SERPL-CCNC: 13.7 U/L (ref 5.6–51.3)
LYMPHOCYTES ABSOLUTE: 1.1 THOU/MM3 (ref 1–4.8)
LYMPHOCYTES NFR BLD AUTO: 7.6 %
MAGNESIUM SERPL-MCNC: 1.7 MG/DL (ref 1.6–2.4)
MCH RBC QN AUTO: 26.9 PG (ref 26–33)
MCHC RBC AUTO-ENTMCNC: 32.7 GM/DL (ref 32.2–35.5)
MCV RBC AUTO: 82.2 FL (ref 81–99)
MONOCYTES ABSOLUTE: 0.9 THOU/MM3 (ref 0.4–1.3)
MONOCYTES NFR BLD AUTO: 5.9 %
NEUTROPHILS ABSOLUTE: 12.6 THOU/MM3 (ref 1.8–7.7)
NEUTROPHILS NFR BLD AUTO: 85.8 %
NRBC BLD AUTO-RTO: 0 /100 WBC
PLATELET # BLD AUTO: 757 THOU/MM3 (ref 130–400)
PMV BLD AUTO: 8.6 FL (ref 9.4–12.4)
POTASSIUM SERPL-SCNC: 3.1 MEQ/L (ref 3.5–5.2)
PROCALCITONIN SERPL IA-MCNC: 0.77 NG/ML (ref 0.01–0.09)
PROT SERPL-MCNC: 5.4 G/DL (ref 6.1–8)
RBC # BLD AUTO: 3.09 MILL/MM3 (ref 4.2–5.4)
RH BLD: NORMAL
SODIUM SERPL-SCNC: 138 MEQ/L (ref 135–145)
TIBC SERPL-MCNC: 113 UG/DL (ref 171–450)
WBC # BLD AUTO: 14.7 THOU/MM3 (ref 4.8–10.8)

## 2025-01-23 PROCEDURE — 6370000000 HC RX 637 (ALT 250 FOR IP)

## 2025-01-23 PROCEDURE — 6360000002 HC RX W HCPCS

## 2025-01-23 PROCEDURE — 83605 ASSAY OF LACTIC ACID: CPT

## 2025-01-23 PROCEDURE — 2580000003 HC RX 258

## 2025-01-23 PROCEDURE — 3609012400 HC EGD TRANSORAL BIOPSY SINGLE/MULTIPLE: Performed by: INTERNAL MEDICINE

## 2025-01-23 PROCEDURE — 71045 X-RAY EXAM CHEST 1 VIEW: CPT

## 2025-01-23 PROCEDURE — 3700000001 HC ADD 15 MINUTES (ANESTHESIA): Performed by: INTERNAL MEDICINE

## 2025-01-23 PROCEDURE — 94669 MECHANICAL CHEST WALL OSCILL: CPT

## 2025-01-23 PROCEDURE — 93005 ELECTROCARDIOGRAM TRACING: CPT

## 2025-01-23 PROCEDURE — 7100000010 HC PHASE II RECOVERY - FIRST 15 MIN: Performed by: INTERNAL MEDICINE

## 2025-01-23 PROCEDURE — 86900 BLOOD TYPING SEROLOGIC ABO: CPT

## 2025-01-23 PROCEDURE — 83735 ASSAY OF MAGNESIUM: CPT

## 2025-01-23 PROCEDURE — 3609017100 HC EGD: Performed by: INTERNAL MEDICINE

## 2025-01-23 PROCEDURE — 88305 TISSUE EXAM BY PATHOLOGIST: CPT

## 2025-01-23 PROCEDURE — 3700000000 HC ANESTHESIA ATTENDED CARE: Performed by: INTERNAL MEDICINE

## 2025-01-23 PROCEDURE — 76705 ECHO EXAM OF ABDOMEN: CPT

## 2025-01-23 PROCEDURE — 6370000000 HC RX 637 (ALT 250 FOR IP): Performed by: INTERNAL MEDICINE

## 2025-01-23 PROCEDURE — 85025 COMPLETE CBC W/AUTO DIFF WBC: CPT

## 2025-01-23 PROCEDURE — 82728 ASSAY OF FERRITIN: CPT

## 2025-01-23 PROCEDURE — 36415 COLL VENOUS BLD VENIPUNCTURE: CPT

## 2025-01-23 PROCEDURE — 0DB68ZX EXCISION OF STOMACH, VIA NATURAL OR ARTIFICIAL OPENING ENDOSCOPIC, DIAGNOSTIC: ICD-10-PCS | Performed by: INTERNAL MEDICINE

## 2025-01-23 PROCEDURE — 2500000003 HC RX 250 WO HCPCS

## 2025-01-23 PROCEDURE — 85014 HEMATOCRIT: CPT

## 2025-01-23 PROCEDURE — 83540 ASSAY OF IRON: CPT

## 2025-01-23 PROCEDURE — 83690 ASSAY OF LIPASE: CPT

## 2025-01-23 PROCEDURE — 86901 BLOOD TYPING SEROLOGIC RH(D): CPT

## 2025-01-23 PROCEDURE — 82248 BILIRUBIN DIRECT: CPT

## 2025-01-23 PROCEDURE — 88342 IMHCHEM/IMCYTCHM 1ST ANTB: CPT

## 2025-01-23 PROCEDURE — 7100000011 HC PHASE II RECOVERY - ADDTL 15 MIN: Performed by: INTERNAL MEDICINE

## 2025-01-23 PROCEDURE — 86885 COOMBS TEST INDIRECT QUAL: CPT

## 2025-01-23 PROCEDURE — 2140000000 HC CCU INTERMEDIATE R&B

## 2025-01-23 PROCEDURE — 6360000002 HC RX W HCPCS: Performed by: NURSE ANESTHETIST, CERTIFIED REGISTERED

## 2025-01-23 PROCEDURE — 80053 COMPREHEN METABOLIC PANEL: CPT

## 2025-01-23 PROCEDURE — 85018 HEMOGLOBIN: CPT

## 2025-01-23 PROCEDURE — 99222 1ST HOSP IP/OBS MODERATE 55: CPT | Performed by: INTERNAL MEDICINE

## 2025-01-23 PROCEDURE — 84145 PROCALCITONIN (PCT): CPT

## 2025-01-23 PROCEDURE — 93010 ELECTROCARDIOGRAM REPORT: CPT | Performed by: INTERNAL MEDICINE

## 2025-01-23 PROCEDURE — 83550 IRON BINDING TEST: CPT

## 2025-01-23 RX ORDER — ROPINIROLE 2 MG/1
2 TABLET, FILM COATED ORAL 2 TIMES DAILY
COMMUNITY

## 2025-01-23 RX ORDER — AZITHROMYCIN 250 MG/1
500 TABLET, FILM COATED ORAL EVERY 24 HOURS
Status: COMPLETED | OUTPATIENT
Start: 2025-01-23 | End: 2025-01-25

## 2025-01-23 RX ORDER — GABAPENTIN 300 MG/1
300 CAPSULE ORAL 3 TIMES DAILY
Status: DISCONTINUED | OUTPATIENT
Start: 2025-01-23 | End: 2025-01-23

## 2025-01-23 RX ORDER — FENTANYL 50 UG/1
1 PATCH TRANSDERMAL
Status: DISCONTINUED | OUTPATIENT
Start: 2025-01-23 | End: 2025-02-01

## 2025-01-23 RX ORDER — LEVOTHYROXINE SODIUM 50 UG/1
50 TABLET ORAL DAILY
Status: DISCONTINUED | OUTPATIENT
Start: 2025-01-23 | End: 2025-02-05 | Stop reason: HOSPADM

## 2025-01-23 RX ORDER — ACETAMINOPHEN 325 MG/1
650 TABLET ORAL EVERY 6 HOURS PRN
Status: DISCONTINUED | OUTPATIENT
Start: 2025-01-23 | End: 2025-02-05 | Stop reason: HOSPADM

## 2025-01-23 RX ORDER — SODIUM CHLORIDE 9 MG/ML
25 INJECTION, SOLUTION INTRAVENOUS PRN
Status: DISCONTINUED | OUTPATIENT
Start: 2025-01-23 | End: 2025-01-23 | Stop reason: HOSPADM

## 2025-01-23 RX ORDER — SODIUM CHLORIDE 9 MG/ML
INJECTION, SOLUTION INTRAVENOUS PRN
Status: DISCONTINUED | OUTPATIENT
Start: 2025-01-23 | End: 2025-02-05 | Stop reason: HOSPADM

## 2025-01-23 RX ORDER — FENTANYL 50 UG/1
1 PATCH TRANSDERMAL
Status: ON HOLD | COMMUNITY
End: 2025-02-05 | Stop reason: HOSPADM

## 2025-01-23 RX ORDER — DULOXETIN HYDROCHLORIDE 30 MG/1
30 CAPSULE, DELAYED RELEASE ORAL DAILY
Status: DISCONTINUED | OUTPATIENT
Start: 2025-01-23 | End: 2025-01-23

## 2025-01-23 RX ORDER — CYCLOBENZAPRINE HCL 10 MG
10 TABLET ORAL 3 TIMES DAILY PRN
Status: ON HOLD | COMMUNITY
End: 2025-02-05 | Stop reason: HOSPADM

## 2025-01-23 RX ORDER — POTASSIUM CHLORIDE 1500 MG/1
40 TABLET, EXTENDED RELEASE ORAL PRN
Status: DISCONTINUED | OUTPATIENT
Start: 2025-01-23 | End: 2025-02-05 | Stop reason: HOSPADM

## 2025-01-23 RX ORDER — ONDANSETRON 4 MG/1
4 TABLET, ORALLY DISINTEGRATING ORAL EVERY 8 HOURS PRN
Status: DISCONTINUED | OUTPATIENT
Start: 2025-01-23 | End: 2025-02-05 | Stop reason: HOSPADM

## 2025-01-23 RX ORDER — ONDANSETRON 2 MG/ML
4 INJECTION INTRAMUSCULAR; INTRAVENOUS EVERY 6 HOURS PRN
Status: DISCONTINUED | OUTPATIENT
Start: 2025-01-23 | End: 2025-02-05 | Stop reason: HOSPADM

## 2025-01-23 RX ORDER — LIDOCAINE HYDROCHLORIDE 20 MG/ML
INJECTION, SOLUTION EPIDURAL; INFILTRATION; INTRACAUDAL; PERINEURAL
Status: DISCONTINUED | OUTPATIENT
Start: 2025-01-23 | End: 2025-01-23 | Stop reason: SDUPTHER

## 2025-01-23 RX ORDER — OXYCODONE AND ACETAMINOPHEN 5; 325 MG/1; MG/1
1 TABLET ORAL EVERY 6 HOURS PRN
Status: DISCONTINUED | OUTPATIENT
Start: 2025-01-23 | End: 2025-01-25

## 2025-01-23 RX ORDER — BETHANECHOL CHLORIDE 25 MG/1
25 TABLET ORAL 3 TIMES DAILY
COMMUNITY

## 2025-01-23 RX ORDER — SODIUM CHLORIDE 0.9 % (FLUSH) 0.9 %
5-40 SYRINGE (ML) INJECTION EVERY 12 HOURS SCHEDULED
Status: DISCONTINUED | OUTPATIENT
Start: 2025-01-23 | End: 2025-02-05 | Stop reason: HOSPADM

## 2025-01-23 RX ORDER — SODIUM CHLORIDE 9 MG/ML
INJECTION, SOLUTION INTRAVENOUS CONTINUOUS
Status: ACTIVE | OUTPATIENT
Start: 2025-01-23 | End: 2025-01-24

## 2025-01-23 RX ORDER — SODIUM CHLORIDE 0.9 % (FLUSH) 0.9 %
5-40 SYRINGE (ML) INJECTION PRN
Status: DISCONTINUED | OUTPATIENT
Start: 2025-01-23 | End: 2025-02-05 | Stop reason: HOSPADM

## 2025-01-23 RX ORDER — MELOXICAM 7.5 MG/1
7.5 TABLET ORAL DAILY
Status: ON HOLD | COMMUNITY
End: 2025-02-05 | Stop reason: HOSPADM

## 2025-01-23 RX ORDER — ROPINIROLE 1 MG/1
2 TABLET, FILM COATED ORAL 2 TIMES DAILY
Status: DISCONTINUED | OUTPATIENT
Start: 2025-01-23 | End: 2025-02-05 | Stop reason: HOSPADM

## 2025-01-23 RX ORDER — POTASSIUM CHLORIDE 7.45 MG/ML
10 INJECTION INTRAVENOUS PRN
Status: DISCONTINUED | OUTPATIENT
Start: 2025-01-23 | End: 2025-02-05 | Stop reason: HOSPADM

## 2025-01-23 RX ORDER — ACETAMINOPHEN 650 MG/1
650 SUPPOSITORY RECTAL EVERY 6 HOURS PRN
Status: DISCONTINUED | OUTPATIENT
Start: 2025-01-23 | End: 2025-02-05 | Stop reason: HOSPADM

## 2025-01-23 RX ORDER — BUPROPION HYDROCHLORIDE 100 MG/1
200 TABLET, EXTENDED RELEASE ORAL DAILY
Status: DISCONTINUED | OUTPATIENT
Start: 2025-01-23 | End: 2025-01-23

## 2025-01-23 RX ORDER — MORPHINE SULFATE 2 MG/ML
0.5 INJECTION, SOLUTION INTRAMUSCULAR; INTRAVENOUS EVERY 4 HOURS PRN
Status: DISCONTINUED | OUTPATIENT
Start: 2025-01-23 | End: 2025-02-01

## 2025-01-23 RX ORDER — CHLORDIAZEPOXIDE HYDROCHLORIDE 5 MG/1
5 CAPSULE, GELATIN COATED ORAL EVERY 6 HOURS PRN
Status: DISCONTINUED | OUTPATIENT
Start: 2025-01-23 | End: 2025-02-03

## 2025-01-23 RX ADMIN — SODIUM CHLORIDE 8 MG/HR: 9 INJECTION, SOLUTION INTRAVENOUS at 11:44

## 2025-01-23 RX ADMIN — POTASSIUM CHLORIDE 40 MEQ: 1500 TABLET, EXTENDED RELEASE ORAL at 15:20

## 2025-01-23 RX ADMIN — PROPOFOL 50 MG: 10 INJECTION, EMULSION INTRAVENOUS at 13:56

## 2025-01-23 RX ADMIN — ROPINIROLE HYDROCHLORIDE 2 MG: 1 TABLET, FILM COATED ORAL at 12:36

## 2025-01-23 RX ADMIN — PROPOFOL 50 MG: 10 INJECTION, EMULSION INTRAVENOUS at 13:57

## 2025-01-23 RX ADMIN — CHLORDIAZEPOXIDE HYDROCHLORIDE 5 MG: 5 CAPSULE ORAL at 21:15

## 2025-01-23 RX ADMIN — SODIUM CHLORIDE, PRESERVATIVE FREE 10 ML: 5 INJECTION INTRAVENOUS at 21:14

## 2025-01-23 RX ADMIN — ROPINIROLE HYDROCHLORIDE 2 MG: 1 TABLET, FILM COATED ORAL at 21:14

## 2025-01-23 RX ADMIN — OXYCODONE HYDROCHLORIDE AND ACETAMINOPHEN 1 TABLET: 5; 325 TABLET ORAL at 23:12

## 2025-01-23 RX ADMIN — SODIUM CHLORIDE: 9 INJECTION, SOLUTION INTRAVENOUS at 20:49

## 2025-01-23 RX ADMIN — SODIUM CHLORIDE: 9 INJECTION, SOLUTION INTRAVENOUS at 12:17

## 2025-01-23 RX ADMIN — LEVOTHYROXINE SODIUM 50 MCG: 0.05 TABLET ORAL at 11:47

## 2025-01-23 RX ADMIN — SODIUM CHLORIDE: 9 INJECTION, SOLUTION INTRAVENOUS at 11:43

## 2025-01-23 RX ADMIN — AZITHROMYCIN DIHYDRATE 500 MG: 250 TABLET, FILM COATED ORAL at 11:47

## 2025-01-23 RX ADMIN — SODIUM CHLORIDE 8 MG/HR: 9 INJECTION, SOLUTION INTRAVENOUS at 22:02

## 2025-01-23 RX ADMIN — LIDOCAINE HYDROCHLORIDE 100 MG: 20 INJECTION, SOLUTION EPIDURAL; INFILTRATION; INTRACAUDAL; PERINEURAL at 13:56

## 2025-01-23 RX ADMIN — OCTREOTIDE ACETATE 25 MCG/HR: 500 INJECTION, SOLUTION INTRAVENOUS; SUBCUTANEOUS at 11:45

## 2025-01-23 RX ADMIN — PIPERACILLIN AND TAZOBACTAM 3375 MG: 3; .375 INJECTION, POWDER, FOR SOLUTION INTRAVENOUS at 12:19

## 2025-01-23 ASSESSMENT — PAIN DESCRIPTION - LOCATION
LOCATION: BACK

## 2025-01-23 ASSESSMENT — PAIN DESCRIPTION - FREQUENCY: FREQUENCY: INTERMITTENT

## 2025-01-23 ASSESSMENT — PAIN DESCRIPTION - PAIN TYPE: TYPE: CHRONIC PAIN

## 2025-01-23 ASSESSMENT — PAIN DESCRIPTION - ORIENTATION
ORIENTATION: LOWER
ORIENTATION: MID;LOWER

## 2025-01-23 ASSESSMENT — PAIN DESCRIPTION - DESCRIPTORS
DESCRIPTORS: ACHING
DESCRIPTORS: SHARP
DESCRIPTORS: SHARP

## 2025-01-23 ASSESSMENT — PAIN SCALES - GENERAL
PAINLEVEL_OUTOF10: 7
PAINLEVEL_OUTOF10: 9
PAINLEVEL_OUTOF10: 6

## 2025-01-23 ASSESSMENT — PAIN - FUNCTIONAL ASSESSMENT
PAIN_FUNCTIONAL_ASSESSMENT: 0-10
PAIN_FUNCTIONAL_ASSESSMENT: 0-10
PAIN_FUNCTIONAL_ASSESSMENT: PREVENTS OR INTERFERES SOME ACTIVE ACTIVITIES AND ADLS
PAIN_FUNCTIONAL_ASSESSMENT: PREVENTS OR INTERFERES WITH MANY ACTIVE NOT PASSIVE ACTIVITIES
PAIN_FUNCTIONAL_ASSESSMENT: 0-10
PAIN_FUNCTIONAL_ASSESSMENT: 0-10

## 2025-01-23 ASSESSMENT — PAIN DESCRIPTION - ONSET: ONSET: ON-GOING

## 2025-01-23 NOTE — H&P
Speaking in full sentences.  Rhonchi bilateral lung bases.  Cardiovascular: Normal rate, regular rhythm with normal S1/S2 without murmurs.    No lower extremity edema.   Abdomen: Soft, non-tender, non-distended with normal bowel sounds.  Negative Rivera sign.  Musculoskeletal: No joint swelling or tenderness. Normal tone. No abnormal movements.   Skin: Warm and dry. No rashes or lesions.  Neurologic:  No focal sensory/motor deficits in the upper or lower extremities. Cranial nerves:  grossly non-focal 2-12.     Psychiatric: Alert and oriented, normal insight and thought content.   Capillary Refill: Brisk,< 3 seconds.  Peripheral Pulses: +2 palpable, equal bilaterally.       Labs:   Recent Labs     01/23/25  1000   WBC 14.7*   HGB 8.3*   HCT 25.4*   *     No results for input(s): \"NA\", \"K\", \"CL\", \"CO2\", \"BUN\", \"CREATININE\", \"CALCIUM\", \"PHOS\" in the last 72 hours.    Invalid input(s): \"MAGNES\"  No results for input(s): \"AST\", \"ALT\", \"BILIDIR\", \"BILITOT\", \"ALKPHOS\" in the last 72 hours.  No results for input(s): \"INR\" in the last 72 hours.  No results for input(s): \"TROPONINT\" in the last 72 hours.  No results for input(s): \"PROCAL\" in the last 72 hours. No results found for: \"NITRU\", \"WBCUA\", \"BACTERIA\", \"RBCUA\", \"BLOODU\", \"SPECGRAV\", \"GLUCOSEU\"    Radiology: No results found.  see assessment and plan for discussion of pertinent imaging.     LDA: []CVC / []PICC / []Midline / []Garves / []Drains / []Mediport / [x]None  Antibiotics: Zosyn, azithromax  Steroids: N/A  Labs (still needed?): [x]Yes / []No  IVF (still needed?): []Yes / [x]No  Level of care: [x]Step Down / []Med-Surg  Bed Status: [x]Inpatient / []Observation  Telemetry: [x]Yes / []No   PT/OT: []Yes / [x]No  DVT Prophylaxis: [] Lovenox / [] Heparin / [] SCDs / [] Already on Systemic Anticoagulation / [x] None   Diet: Diet NPO Exceptions are: Sips of Water with Meds  Code Status: Full Code      Electronically signed by Lydia Ron MD on 1/23/2025

## 2025-01-23 NOTE — ANESTHESIA POSTPROCEDURE EVALUATION
Department of Anesthesiology  Postprocedure Note    Patient: Letty Edge  MRN: [id number]  YOB: 1945  Date of evaluation: 1/23/2025    Procedure Summary       Date: 01/23/25 Room / Location: [place] [address] / [facility]    Anesthesia Start: 1348 Anesthesia Stop: 1408    Procedures:       EGD      ESOPHAGOGASTRODUODENOSCOPY BIOPSY (Left: Esophagus) Diagnosis:       Iron deficiency anemia, unspecified iron deficiency anemia type      (Iron deficiency anemia, unspecified iron deficiency anemia type [D50.9])    Surgeons: Cyrus Hernandez MD Responsible Provider: Noah Jimenez MD    Anesthesia Type: MAC, TIVA ASA Status: 3            Anesthesia Type: No value filed.    Anibal Phase I: Anibal Score: 10    Anibal Phase II:      Anesthesia Post Evaluation    Patient location during evaluation: bedside  Patient participation: complete - patient participated  Level of consciousness: responsive to verbal stimuli  Airway patency: patent  Nausea & Vomiting: no nausea and no vomiting  Cardiovascular status: hemodynamically stable  Respiratory status: acceptable, spontaneous ventilation and nasal cannula  Hydration status: euvolemic  Pain management: adequate        No notable events documented.

## 2025-01-23 NOTE — PROCEDURES
PROCEDURE NOTE  Date: 1/23/2025   Name: Letty BUSH Minesh  YOB: 1945    Procedures  EKG completed

## 2025-01-23 NOTE — PROCEDURES
[address] [address]                             PROCEDURE NOTE      PATIENT NAME: RAMESH MEDINA               : 1945  MED REC NO: [id number]                       ROOM: [facility]  ACCOUNT NO: [id number]                       ADMIT DATE: 2025  PROVIDER: Cyrus Hernandez MD      DATE OF PROCEDURE:  2025    SURGEON:  Cyrus Hernandez MD    PROCEDURE TYPE:  Upper endoscopy.    INSTRUMENT:  Olympus video upper endoscope.    MEDICATIONS:  Propofol, see Anesthesia documentation report.    ESTIMATED BLOOD LOSS: nil    BRIEF HISTORY:  The patient is an 79-year-old admitted with a history of heme-positive stool and severe anemia.  She did get blood transfusion at an outside hospital.  There has been no overt bleeding here.  Her H and H are 8.3/25.4.    Due to severe anemia, upper endoscopy was planned.  The procedure was discussed with her.  Following discussion, she expressed understanding and did wish to proceed.    DESCRIPTION OF PROCEDURE:  The patient arrived to the endoscopy department from the medical floor.  She was placed on the appropriate monitoring devices.  She was placed in the left lateral decubitus position.  Sedation provided by nurse anesthetist using propofol.  Then, the Olympus video upper endoscope passed gently across the cricopharyngeal musculature into the esophagus under direct endoscopic visualization.  The upper and middle portions of the esophagus were normal.  The lower esophagus demonstrated ulcerations around the level of the EG junction.  There was no bleeding.  These were white exudate with ulcerations.  This was focally right around the EG junction.  The scope was advanced into the stomach.  The mucosa was coated with a white coating suggesting possible barium or medication.  This was washed.  The scope was advanced through the stomach across pylorus into the descending duodenum; withdrawn back,

## 2025-01-23 NOTE — ANESTHESIA PRE PROCEDURE
Department of Anesthesiology  Preprocedure Note       Name:  Letty Edge   Age:  79 y.o.  :  1945                                          MRN:  [id number]         Date:  2025      Surgeon: Surgeon(s):  Cyrus Hernandez MD    Procedure: Procedure(s):  EGD    Medications prior to admission:   Prior to Admission medications    Medication Sig Start Date End Date Taking? Authorizing Provider   bethanechol (URECHOLINE) 25 MG tablet Take 1 tablet by mouth 3 times daily   Yes Jordan Kramer MD   cyclobenzaprine (FLEXERIL) 10 MG tablet Take 1 tablet by mouth 3 times daily as needed for Muscle spasms   Yes Jordan Kramer MD   fentaNYL (DURAGESIC) 50 MCG/HR Place 1 patch onto the skin every 72 hours. Max Daily Amount: 1 patch   Yes Jordan Kramer MD   meloxicam (MOBIC) 7.5 MG tablet Take 1 tablet by mouth daily   Yes Jordan Kramer MD   rOPINIRole (REQUIP) 2 MG tablet Take 1 tablet by mouth in the morning and at bedtime   Yes Jordan Kramer MD   oxyCODONE-acetaminophen (PERCOCET) 5-325 MG per tablet Take 1 tablet by mouth every 6 hours as needed for Pain.    Jordan Kramer MD   Ibuprofen (MOTRIN PO) Take 200 mg by mouth 3 times daily Takes with Percocet    Jordan Kramer MD   chlordiazePOXIDE (LIBRIUM) 5 MG capsule Take 5 mg by mouth 2 times daily as needed for Anxiety.    Jordan Kramer MD   pantoprazole (PROTONIX) 40 MG tablet Take 1 tablet by mouth daily    Jordan Kramer MD   ferrous sulfate 325 (65 Fe) MG tablet Take 325 mg by mouth daily (with breakfast)    Jordan Kramer MD   levothyroxine (SYNTHROID) 50 MCG tablet Take 50 mcg by mouth Daily    Jordan Kramer MD   potassium chloride (MICRO-K) 10 MEQ extended release capsule Take 10 mEq by mouth 2 times daily    Jordan Kramer MD       Current medications:    Current Facility-Administered Medications   Medication Dose Route Frequency Provider Last Rate Last Admin

## 2025-01-24 LAB
APTT PPP: 28.3 SECONDS (ref 22–38)
FIBRINOGEN PPP-MCNC: 381 MG/100ML (ref 155–475)
HCT VFR BLD AUTO: 25 % (ref 37–47)
HCT VFR BLD AUTO: 27.9 % (ref 37–47)
HCT VFR BLD AUTO: 29.5 % (ref 37–47)
HCT VFR BLD AUTO: 30.2 % (ref 37–47)
HGB BLD-MCNC: 8.1 GM/DL (ref 12–16)
HGB BLD-MCNC: 8.9 GM/DL (ref 12–16)
HGB BLD-MCNC: 9.4 GM/DL (ref 12–16)
HGB BLD-MCNC: 9.4 GM/DL (ref 12–16)
INR PPP: 0.97 (ref 0.85–1.13)

## 2025-01-24 PROCEDURE — 2140000000 HC CCU INTERMEDIATE R&B

## 2025-01-24 PROCEDURE — 6370000000 HC RX 637 (ALT 250 FOR IP)

## 2025-01-24 PROCEDURE — 6360000002 HC RX W HCPCS: Performed by: STUDENT IN AN ORGANIZED HEALTH CARE EDUCATION/TRAINING PROGRAM

## 2025-01-24 PROCEDURE — 97535 SELF CARE MNGMENT TRAINING: CPT

## 2025-01-24 PROCEDURE — 85610 PROTHROMBIN TIME: CPT

## 2025-01-24 PROCEDURE — 97162 PT EVAL MOD COMPLEX 30 MIN: CPT

## 2025-01-24 PROCEDURE — 30233N1 TRANSFUSION OF NONAUTOLOGOUS RED BLOOD CELLS INTO PERIPHERAL VEIN, PERCUTANEOUS APPROACH: ICD-10-PCS | Performed by: STUDENT IN AN ORGANIZED HEALTH CARE EDUCATION/TRAINING PROGRAM

## 2025-01-24 PROCEDURE — 6370000000 HC RX 637 (ALT 250 FOR IP): Performed by: NURSE PRACTITIONER

## 2025-01-24 PROCEDURE — 97166 OT EVAL MOD COMPLEX 45 MIN: CPT

## 2025-01-24 PROCEDURE — 2500000003 HC RX 250 WO HCPCS

## 2025-01-24 PROCEDURE — 36415 COLL VENOUS BLD VENIPUNCTURE: CPT

## 2025-01-24 PROCEDURE — 2500000003 HC RX 250 WO HCPCS: Performed by: STUDENT IN AN ORGANIZED HEALTH CARE EDUCATION/TRAINING PROGRAM

## 2025-01-24 PROCEDURE — 85018 HEMOGLOBIN: CPT

## 2025-01-24 PROCEDURE — 85014 HEMATOCRIT: CPT

## 2025-01-24 PROCEDURE — 99233 SBSQ HOSP IP/OBS HIGH 50: CPT | Performed by: STUDENT IN AN ORGANIZED HEALTH CARE EDUCATION/TRAINING PROGRAM

## 2025-01-24 PROCEDURE — 97116 GAIT TRAINING THERAPY: CPT

## 2025-01-24 PROCEDURE — 97530 THERAPEUTIC ACTIVITIES: CPT

## 2025-01-24 PROCEDURE — 85384 FIBRINOGEN ACTIVITY: CPT

## 2025-01-24 PROCEDURE — 85730 THROMBOPLASTIN TIME PARTIAL: CPT

## 2025-01-24 RX ORDER — PANTOPRAZOLE SODIUM 40 MG/1
40 TABLET, DELAYED RELEASE ORAL
Status: DISCONTINUED | OUTPATIENT
Start: 2025-01-24 | End: 2025-02-05 | Stop reason: HOSPADM

## 2025-01-24 RX ADMIN — OXYCODONE HYDROCHLORIDE AND ACETAMINOPHEN 1 TABLET: 5; 325 TABLET ORAL at 18:30

## 2025-01-24 RX ADMIN — AZITHROMYCIN DIHYDRATE 500 MG: 250 TABLET, FILM COATED ORAL at 10:17

## 2025-01-24 RX ADMIN — ROPINIROLE HYDROCHLORIDE 2 MG: 1 TABLET, FILM COATED ORAL at 18:30

## 2025-01-24 RX ADMIN — OXYCODONE HYDROCHLORIDE AND ACETAMINOPHEN 1 TABLET: 5; 325 TABLET ORAL at 23:51

## 2025-01-24 RX ADMIN — LEVOTHYROXINE SODIUM 50 MCG: 0.05 TABLET ORAL at 10:16

## 2025-01-24 RX ADMIN — PANTOPRAZOLE SODIUM 40 MG: 40 TABLET, DELAYED RELEASE ORAL at 18:30

## 2025-01-24 RX ADMIN — ROPINIROLE HYDROCHLORIDE 2 MG: 1 TABLET, FILM COATED ORAL at 10:17

## 2025-01-24 RX ADMIN — WATER 1000 MG: 1 INJECTION INTRAMUSCULAR; INTRAVENOUS; SUBCUTANEOUS at 10:20

## 2025-01-24 RX ADMIN — PANTOPRAZOLE SODIUM 40 MG: 40 TABLET, DELAYED RELEASE ORAL at 10:17

## 2025-01-24 RX ADMIN — SODIUM CHLORIDE, PRESERVATIVE FREE 10 ML: 5 INJECTION INTRAVENOUS at 20:31

## 2025-01-24 RX ADMIN — SODIUM CHLORIDE, PRESERVATIVE FREE 10 ML: 5 INJECTION INTRAVENOUS at 10:20

## 2025-01-24 RX ADMIN — OXYCODONE HYDROCHLORIDE AND ACETAMINOPHEN 1 TABLET: 5; 325 TABLET ORAL at 10:17

## 2025-01-24 ASSESSMENT — PAIN DESCRIPTION - FREQUENCY
FREQUENCY: CONTINUOUS

## 2025-01-24 ASSESSMENT — PAIN - FUNCTIONAL ASSESSMENT
PAIN_FUNCTIONAL_ASSESSMENT: ACTIVITIES ARE NOT PREVENTED

## 2025-01-24 ASSESSMENT — PAIN SCALES - GENERAL
PAINLEVEL_OUTOF10: 6
PAINLEVEL_OUTOF10: 5
PAINLEVEL_OUTOF10: 7
PAINLEVEL_OUTOF10: 8
PAINLEVEL_OUTOF10: 3

## 2025-01-24 ASSESSMENT — PAIN DESCRIPTION - DESCRIPTORS
DESCRIPTORS: STABBING
DESCRIPTORS: SHARP
DESCRIPTORS: STABBING

## 2025-01-24 ASSESSMENT — PAIN DESCRIPTION - PAIN TYPE
TYPE: CHRONIC PAIN

## 2025-01-24 ASSESSMENT — PAIN DESCRIPTION - LOCATION
LOCATION: BACK

## 2025-01-24 ASSESSMENT — PAIN DESCRIPTION - ORIENTATION
ORIENTATION: LOWER

## 2025-01-24 ASSESSMENT — PAIN DESCRIPTION - ONSET
ONSET: ON-GOING

## 2025-01-25 LAB
ANION GAP SERPL CALC-SCNC: 11 MEQ/L (ref 8–16)
ANION GAP SERPL CALC-SCNC: 6 MEQ/L (ref 8–16)
BUN SERPL-MCNC: 7 MG/DL (ref 7–22)
BUN SERPL-MCNC: 8 MG/DL (ref 7–22)
CA-I BLD ISE-SCNC: 1.13 MMOL/L (ref 1.12–1.32)
CALCIUM SERPL-MCNC: 8.3 MG/DL (ref 8.5–10.5)
CALCIUM SERPL-MCNC: 9 MG/DL (ref 8.5–10.5)
CHLORIDE SERPL-SCNC: 100 MEQ/L (ref 98–111)
CHLORIDE SERPL-SCNC: 100 MEQ/L (ref 98–111)
CO2 SERPL-SCNC: 27 MEQ/L (ref 23–33)
CO2 SERPL-SCNC: 28 MEQ/L (ref 23–33)
CREAT SERPL-MCNC: 0.3 MG/DL (ref 0.4–1.2)
CREAT SERPL-MCNC: 0.5 MG/DL (ref 0.4–1.2)
GFR SERPL CREATININE-BSD FRML MDRD: > 90 ML/MIN/1.73M2
GFR SERPL CREATININE-BSD FRML MDRD: > 90 ML/MIN/1.73M2
GLUCOSE SERPL-MCNC: 117 MG/DL (ref 70–108)
GLUCOSE SERPL-MCNC: 96 MG/DL (ref 70–108)
HCT VFR BLD AUTO: 26.5 % (ref 37–47)
HCT VFR BLD AUTO: 30.4 % (ref 37–47)
HGB BLD-MCNC: 8.6 GM/DL (ref 12–16)
HGB BLD-MCNC: 9.6 GM/DL (ref 12–16)
POTASSIUM SERPL-SCNC: 3.6 MEQ/L (ref 3.5–5.2)
POTASSIUM SERPL-SCNC: 4.2 MEQ/L (ref 3.5–5.2)
SODIUM SERPL-SCNC: 134 MEQ/L (ref 135–145)
SODIUM SERPL-SCNC: 138 MEQ/L (ref 135–145)

## 2025-01-25 PROCEDURE — 2500000003 HC RX 250 WO HCPCS

## 2025-01-25 PROCEDURE — 80048 BASIC METABOLIC PNL TOTAL CA: CPT

## 2025-01-25 PROCEDURE — 6370000000 HC RX 637 (ALT 250 FOR IP): Performed by: STUDENT IN AN ORGANIZED HEALTH CARE EDUCATION/TRAINING PROGRAM

## 2025-01-25 PROCEDURE — 6370000000 HC RX 637 (ALT 250 FOR IP): Performed by: NURSE PRACTITIONER

## 2025-01-25 PROCEDURE — 99233 SBSQ HOSP IP/OBS HIGH 50: CPT | Performed by: STUDENT IN AN ORGANIZED HEALTH CARE EDUCATION/TRAINING PROGRAM

## 2025-01-25 PROCEDURE — 85014 HEMATOCRIT: CPT

## 2025-01-25 PROCEDURE — 2580000003 HC RX 258: Performed by: STUDENT IN AN ORGANIZED HEALTH CARE EDUCATION/TRAINING PROGRAM

## 2025-01-25 PROCEDURE — 6370000000 HC RX 637 (ALT 250 FOR IP)

## 2025-01-25 PROCEDURE — 2140000000 HC CCU INTERMEDIATE R&B

## 2025-01-25 PROCEDURE — 6360000002 HC RX W HCPCS: Performed by: STUDENT IN AN ORGANIZED HEALTH CARE EDUCATION/TRAINING PROGRAM

## 2025-01-25 PROCEDURE — 36415 COLL VENOUS BLD VENIPUNCTURE: CPT

## 2025-01-25 PROCEDURE — 2500000003 HC RX 250 WO HCPCS: Performed by: NURSE PRACTITIONER

## 2025-01-25 PROCEDURE — 6370000000 HC RX 637 (ALT 250 FOR IP): Performed by: INTERNAL MEDICINE

## 2025-01-25 PROCEDURE — 82330 ASSAY OF CALCIUM: CPT

## 2025-01-25 PROCEDURE — 2500000003 HC RX 250 WO HCPCS: Performed by: STUDENT IN AN ORGANIZED HEALTH CARE EDUCATION/TRAINING PROGRAM

## 2025-01-25 PROCEDURE — 85018 HEMOGLOBIN: CPT

## 2025-01-25 RX ORDER — ENALAPRILAT 1.25 MG/ML
0.62 INJECTION INTRAVENOUS ONCE
Status: COMPLETED | OUTPATIENT
Start: 2025-01-25 | End: 2025-01-25

## 2025-01-25 RX ORDER — SUCRALFATE 1 G/1
1 TABLET ORAL
Status: DISCONTINUED | OUTPATIENT
Start: 2025-01-25 | End: 2025-02-05 | Stop reason: HOSPADM

## 2025-01-25 RX ORDER — OXYCODONE AND ACETAMINOPHEN 5; 325 MG/1; MG/1
1 TABLET ORAL EVERY 4 HOURS PRN
Status: DISCONTINUED | OUTPATIENT
Start: 2025-01-25 | End: 2025-01-31

## 2025-01-25 RX ORDER — LACTOBACILLUS RHAMNOSUS GG 10B CELL
1 CAPSULE ORAL
Status: DISCONTINUED | OUTPATIENT
Start: 2025-01-25 | End: 2025-02-05 | Stop reason: HOSPADM

## 2025-01-25 RX ORDER — BETHANECHOL CHLORIDE 25 MG/1
25 TABLET ORAL 3 TIMES DAILY
Status: DISCONTINUED | OUTPATIENT
Start: 2025-01-25 | End: 2025-02-05 | Stop reason: HOSPADM

## 2025-01-25 RX ORDER — FLUTICASONE PROPIONATE 50 MCG
2 SPRAY, SUSPENSION (ML) NASAL 2 TIMES DAILY
Status: DISCONTINUED | OUTPATIENT
Start: 2025-01-25 | End: 2025-02-05 | Stop reason: HOSPADM

## 2025-01-25 RX ORDER — SODIUM CHLORIDE, SODIUM LACTATE, POTASSIUM CHLORIDE, CALCIUM CHLORIDE 600; 310; 30; 20 MG/100ML; MG/100ML; MG/100ML; MG/100ML
INJECTION, SOLUTION INTRAVENOUS CONTINUOUS
Status: ACTIVE | OUTPATIENT
Start: 2025-01-25 | End: 2025-01-25

## 2025-01-25 RX ORDER — LOPERAMIDE HYDROCHLORIDE 2 MG/1
2 CAPSULE ORAL 4 TIMES DAILY PRN
Status: DISCONTINUED | OUTPATIENT
Start: 2025-01-25 | End: 2025-02-05 | Stop reason: HOSPADM

## 2025-01-25 RX ADMIN — PANTOPRAZOLE SODIUM 40 MG: 40 TABLET, DELAYED RELEASE ORAL at 18:10

## 2025-01-25 RX ADMIN — SODIUM CHLORIDE, PRESERVATIVE FREE 10 ML: 5 INJECTION INTRAVENOUS at 21:18

## 2025-01-25 RX ADMIN — LEVOTHYROXINE SODIUM 50 MCG: 0.05 TABLET ORAL at 05:35

## 2025-01-25 RX ADMIN — SUCRALFATE 1 G: 1 TABLET ORAL at 13:29

## 2025-01-25 RX ADMIN — AZITHROMYCIN DIHYDRATE 500 MG: 250 TABLET, FILM COATED ORAL at 13:27

## 2025-01-25 RX ADMIN — PANTOPRAZOLE SODIUM 40 MG: 40 TABLET, DELAYED RELEASE ORAL at 05:35

## 2025-01-25 RX ADMIN — BETHANECHOL CHLORIDE 25 MG: 25 TABLET ORAL at 13:26

## 2025-01-25 RX ADMIN — ENALAPRILAT 0.62 MG: 2.5 INJECTION INTRAVENOUS at 03:46

## 2025-01-25 RX ADMIN — ROPINIROLE HYDROCHLORIDE 2 MG: 1 TABLET, FILM COATED ORAL at 21:17

## 2025-01-25 RX ADMIN — SODIUM CHLORIDE, POTASSIUM CHLORIDE, SODIUM LACTATE AND CALCIUM CHLORIDE: 600; 310; 30; 20 INJECTION, SOLUTION INTRAVENOUS at 14:40

## 2025-01-25 RX ADMIN — OXYCODONE HYDROCHLORIDE AND ACETAMINOPHEN 1 TABLET: 5; 325 TABLET ORAL at 21:17

## 2025-01-25 RX ADMIN — SUCRALFATE 1 G: 1 TABLET ORAL at 21:17

## 2025-01-25 RX ADMIN — FLUTICASONE PROPIONATE 2 SPRAY: 50 SPRAY, METERED NASAL at 18:09

## 2025-01-25 RX ADMIN — CHLORDIAZEPOXIDE HYDROCHLORIDE 5 MG: 5 CAPSULE ORAL at 21:17

## 2025-01-25 RX ADMIN — SODIUM CHLORIDE, PRESERVATIVE FREE 10 ML: 5 INJECTION INTRAVENOUS at 08:43

## 2025-01-25 RX ADMIN — Medication 1 CAPSULE: at 14:32

## 2025-01-25 RX ADMIN — SUCRALFATE 1 G: 1 TABLET ORAL at 18:10

## 2025-01-25 RX ADMIN — ROPINIROLE HYDROCHLORIDE 2 MG: 1 TABLET, FILM COATED ORAL at 08:43

## 2025-01-25 RX ADMIN — OXYCODONE HYDROCHLORIDE AND ACETAMINOPHEN 1 TABLET: 5; 325 TABLET ORAL at 08:43

## 2025-01-25 RX ADMIN — OXYCODONE HYDROCHLORIDE AND ACETAMINOPHEN 1 TABLET: 5; 325 TABLET ORAL at 14:32

## 2025-01-25 RX ADMIN — BETHANECHOL CHLORIDE 25 MG: 25 TABLET ORAL at 21:17

## 2025-01-25 RX ADMIN — Medication 3 MG: at 23:42

## 2025-01-25 RX ADMIN — LOPERAMIDE HYDROCHLORIDE 2 MG: 2 CAPSULE ORAL at 14:32

## 2025-01-25 RX ADMIN — WATER 1000 MG: 1 INJECTION INTRAMUSCULAR; INTRAVENOUS; SUBCUTANEOUS at 13:25

## 2025-01-25 RX ADMIN — FLUTICASONE PROPIONATE 2 SPRAY: 50 SPRAY, METERED NASAL at 21:18

## 2025-01-25 ASSESSMENT — PAIN - FUNCTIONAL ASSESSMENT
PAIN_FUNCTIONAL_ASSESSMENT: PREVENTS OR INTERFERES SOME ACTIVE ACTIVITIES AND ADLS
PAIN_FUNCTIONAL_ASSESSMENT: ACTIVITIES ARE NOT PREVENTED

## 2025-01-25 ASSESSMENT — PAIN DESCRIPTION - ORIENTATION
ORIENTATION: LOWER
ORIENTATION: LOWER;MID
ORIENTATION: MID;LOWER

## 2025-01-25 ASSESSMENT — PAIN DESCRIPTION - LOCATION
LOCATION: BACK

## 2025-01-25 ASSESSMENT — PAIN SCALES - GENERAL
PAINLEVEL_OUTOF10: 7
PAINLEVEL_OUTOF10: 8
PAINLEVEL_OUTOF10: 8
PAINLEVEL_OUTOF10: 4
PAINLEVEL_OUTOF10: 8
PAINLEVEL_OUTOF10: 2

## 2025-01-25 ASSESSMENT — PAIN DESCRIPTION - ONSET
ONSET: ON-GOING
ONSET: ON-GOING

## 2025-01-25 ASSESSMENT — PAIN DESCRIPTION - FREQUENCY
FREQUENCY: CONTINUOUS
FREQUENCY: CONTINUOUS

## 2025-01-25 ASSESSMENT — PAIN DESCRIPTION - PAIN TYPE
TYPE: CHRONIC PAIN
TYPE: CHRONIC PAIN

## 2025-01-25 ASSESSMENT — PAIN DESCRIPTION - DESCRIPTORS
DESCRIPTORS: ACHING;DISCOMFORT
DESCRIPTORS: SHARP
DESCRIPTORS: ACHING;CRAMPING;DISCOMFORT

## 2025-01-26 LAB
ANION GAP SERPL CALC-SCNC: 15 MEQ/L (ref 8–16)
BUN SERPL-MCNC: 6 MG/DL (ref 7–22)
CALCIUM SERPL-MCNC: 9 MG/DL (ref 8.5–10.5)
CHLORIDE SERPL-SCNC: 98 MEQ/L (ref 98–111)
CO2 SERPL-SCNC: 23 MEQ/L (ref 23–33)
CREAT SERPL-MCNC: 0.4 MG/DL (ref 0.4–1.2)
DEPRECATED RDW RBC AUTO: 50.4 FL (ref 35–45)
ERYTHROCYTE [DISTWIDTH] IN BLOOD BY AUTOMATED COUNT: 16.1 % (ref 11.5–14.5)
GFR SERPL CREATININE-BSD FRML MDRD: > 90 ML/MIN/1.73M2
GLUCOSE SERPL-MCNC: 85 MG/DL (ref 70–108)
HCT VFR BLD AUTO: 32.2 % (ref 37–47)
HGB BLD-MCNC: 9.9 GM/DL (ref 12–16)
MCH RBC QN AUTO: 26.8 PG (ref 26–33)
MCHC RBC AUTO-ENTMCNC: 30.7 GM/DL (ref 32.2–35.5)
MCV RBC AUTO: 87 FL (ref 81–99)
PLATELET # BLD AUTO: 681 THOU/MM3 (ref 130–400)
PMV BLD AUTO: 8.5 FL (ref 9.4–12.4)
POTASSIUM SERPL-SCNC: 3.3 MEQ/L (ref 3.5–5.2)
RBC # BLD AUTO: 3.7 MILL/MM3 (ref 4.2–5.4)
SODIUM SERPL-SCNC: 136 MEQ/L (ref 135–145)
WBC # BLD AUTO: 9.5 THOU/MM3 (ref 4.8–10.8)

## 2025-01-26 PROCEDURE — 2500000003 HC RX 250 WO HCPCS

## 2025-01-26 PROCEDURE — 85027 COMPLETE CBC AUTOMATED: CPT

## 2025-01-26 PROCEDURE — 2500000003 HC RX 250 WO HCPCS: Performed by: STUDENT IN AN ORGANIZED HEALTH CARE EDUCATION/TRAINING PROGRAM

## 2025-01-26 PROCEDURE — 36415 COLL VENOUS BLD VENIPUNCTURE: CPT

## 2025-01-26 PROCEDURE — 6360000002 HC RX W HCPCS: Performed by: STUDENT IN AN ORGANIZED HEALTH CARE EDUCATION/TRAINING PROGRAM

## 2025-01-26 PROCEDURE — 6370000000 HC RX 637 (ALT 250 FOR IP)

## 2025-01-26 PROCEDURE — 83520 IMMUNOASSAY QUANT NOS NONAB: CPT

## 2025-01-26 PROCEDURE — 6370000000 HC RX 637 (ALT 250 FOR IP): Performed by: INTERNAL MEDICINE

## 2025-01-26 PROCEDURE — 6370000000 HC RX 637 (ALT 250 FOR IP): Performed by: STUDENT IN AN ORGANIZED HEALTH CARE EDUCATION/TRAINING PROGRAM

## 2025-01-26 PROCEDURE — 6370000000 HC RX 637 (ALT 250 FOR IP): Performed by: NURSE PRACTITIONER

## 2025-01-26 PROCEDURE — 2140000000 HC CCU INTERMEDIATE R&B

## 2025-01-26 PROCEDURE — 99233 SBSQ HOSP IP/OBS HIGH 50: CPT | Performed by: STUDENT IN AN ORGANIZED HEALTH CARE EDUCATION/TRAINING PROGRAM

## 2025-01-26 PROCEDURE — 80048 BASIC METABOLIC PNL TOTAL CA: CPT

## 2025-01-26 PROCEDURE — 6360000002 HC RX W HCPCS

## 2025-01-26 RX ORDER — BISACODYL 5 MG/1
20 TABLET, DELAYED RELEASE ORAL 2 TIMES DAILY
Status: COMPLETED | OUTPATIENT
Start: 2025-01-27 | End: 2025-01-27

## 2025-01-26 RX ORDER — HYDROXYZINE HYDROCHLORIDE 25 MG/1
25 TABLET, FILM COATED ORAL 3 TIMES DAILY PRN
Status: DISCONTINUED | OUTPATIENT
Start: 2025-01-26 | End: 2025-02-05 | Stop reason: HOSPADM

## 2025-01-26 RX ORDER — METOPROLOL SUCCINATE 25 MG/1
25 TABLET, EXTENDED RELEASE ORAL DAILY
Status: DISCONTINUED | OUTPATIENT
Start: 2025-01-26 | End: 2025-01-27

## 2025-01-26 RX ORDER — POLYETHYLENE GLYCOL 3350 17 G/17G
238 POWDER, FOR SOLUTION ORAL ONCE
Status: DISCONTINUED | OUTPATIENT
Start: 2025-01-27 | End: 2025-01-27

## 2025-01-26 RX ADMIN — BETHANECHOL CHLORIDE 25 MG: 25 TABLET ORAL at 13:59

## 2025-01-26 RX ADMIN — PANTOPRAZOLE SODIUM 40 MG: 40 TABLET, DELAYED RELEASE ORAL at 07:54

## 2025-01-26 RX ADMIN — CHLORDIAZEPOXIDE HYDROCHLORIDE 5 MG: 5 CAPSULE ORAL at 09:13

## 2025-01-26 RX ADMIN — BETHANECHOL CHLORIDE 25 MG: 25 TABLET ORAL at 09:19

## 2025-01-26 RX ADMIN — SUCRALFATE 1 G: 1 TABLET ORAL at 09:17

## 2025-01-26 RX ADMIN — HYDROXYZINE HYDROCHLORIDE 25 MG: 25 TABLET ORAL at 14:04

## 2025-01-26 RX ADMIN — ACETAMINOPHEN 650 MG: 325 TABLET ORAL at 11:25

## 2025-01-26 RX ADMIN — FLUTICASONE PROPIONATE 2 SPRAY: 50 SPRAY, METERED NASAL at 09:15

## 2025-01-26 RX ADMIN — WATER 1000 MG: 1 INJECTION INTRAMUSCULAR; INTRAVENOUS; SUBCUTANEOUS at 10:48

## 2025-01-26 RX ADMIN — SUCRALFATE 1 G: 1 TABLET ORAL at 11:25

## 2025-01-26 RX ADMIN — SODIUM CHLORIDE, PRESERVATIVE FREE 10 ML: 5 INJECTION INTRAVENOUS at 21:58

## 2025-01-26 RX ADMIN — FLUTICASONE PROPIONATE 2 SPRAY: 50 SPRAY, METERED NASAL at 21:58

## 2025-01-26 RX ADMIN — MORPHINE SULFATE 0.5 MG: 2 INJECTION, SOLUTION INTRAMUSCULAR; INTRAVENOUS at 16:32

## 2025-01-26 RX ADMIN — MORPHINE SULFATE 0.5 MG: 2 INJECTION, SOLUTION INTRAMUSCULAR; INTRAVENOUS at 21:54

## 2025-01-26 RX ADMIN — CHLORDIAZEPOXIDE HYDROCHLORIDE 5 MG: 5 CAPSULE ORAL at 22:56

## 2025-01-26 RX ADMIN — CHLORDIAZEPOXIDE HYDROCHLORIDE 5 MG: 5 CAPSULE ORAL at 16:57

## 2025-01-26 RX ADMIN — ROPINIROLE HYDROCHLORIDE 2 MG: 1 TABLET, FILM COATED ORAL at 16:21

## 2025-01-26 RX ADMIN — SUCRALFATE 1 G: 1 TABLET ORAL at 16:59

## 2025-01-26 RX ADMIN — SUCRALFATE 1 G: 1 TABLET ORAL at 22:00

## 2025-01-26 RX ADMIN — Medication 1 CAPSULE: at 09:14

## 2025-01-26 RX ADMIN — MORPHINE SULFATE 0.5 MG: 2 INJECTION, SOLUTION INTRAMUSCULAR; INTRAVENOUS at 11:34

## 2025-01-26 RX ADMIN — LOPERAMIDE HYDROCHLORIDE 2 MG: 2 CAPSULE ORAL at 07:54

## 2025-01-26 RX ADMIN — Medication 3 MG: at 23:29

## 2025-01-26 RX ADMIN — OXYCODONE HYDROCHLORIDE AND ACETAMINOPHEN 1 TABLET: 5; 325 TABLET ORAL at 08:04

## 2025-01-26 RX ADMIN — SODIUM CHLORIDE, PRESERVATIVE FREE 10 ML: 5 INJECTION INTRAVENOUS at 09:18

## 2025-01-26 RX ADMIN — BETHANECHOL CHLORIDE 25 MG: 25 TABLET ORAL at 22:00

## 2025-01-26 RX ADMIN — ROPINIROLE HYDROCHLORIDE 2 MG: 1 TABLET, FILM COATED ORAL at 21:59

## 2025-01-26 RX ADMIN — OXYCODONE HYDROCHLORIDE AND ACETAMINOPHEN 1 TABLET: 5; 325 TABLET ORAL at 12:52

## 2025-01-26 RX ADMIN — OXYCODONE HYDROCHLORIDE AND ACETAMINOPHEN 1 TABLET: 5; 325 TABLET ORAL at 18:35

## 2025-01-26 RX ADMIN — PANTOPRAZOLE SODIUM 40 MG: 40 TABLET, DELAYED RELEASE ORAL at 16:19

## 2025-01-26 RX ADMIN — LEVOTHYROXINE SODIUM 50 MCG: 0.05 TABLET ORAL at 07:54

## 2025-01-26 RX ADMIN — HYDROXYZINE HYDROCHLORIDE 25 MG: 25 TABLET ORAL at 21:57

## 2025-01-26 RX ADMIN — POTASSIUM BICARBONATE 40 MEQ: 782 TABLET, EFFERVESCENT ORAL at 11:44

## 2025-01-26 RX ADMIN — METOPROLOL SUCCINATE 25 MG: 25 TABLET, FILM COATED, EXTENDED RELEASE ORAL at 16:20

## 2025-01-26 RX ADMIN — OXYCODONE HYDROCHLORIDE AND ACETAMINOPHEN 1 TABLET: 5; 325 TABLET ORAL at 23:29

## 2025-01-26 RX ADMIN — OXYCODONE HYDROCHLORIDE AND ACETAMINOPHEN 1 TABLET: 5; 325 TABLET ORAL at 01:38

## 2025-01-26 ASSESSMENT — PAIN - FUNCTIONAL ASSESSMENT
PAIN_FUNCTIONAL_ASSESSMENT: PREVENTS OR INTERFERES SOME ACTIVE ACTIVITIES AND ADLS

## 2025-01-26 ASSESSMENT — PAIN DESCRIPTION - LOCATION
LOCATION: BACK
LOCATION: BREAST

## 2025-01-26 ASSESSMENT — PAIN DESCRIPTION - ORIENTATION
ORIENTATION: MID;LOWER
ORIENTATION: MID;LOWER
ORIENTATION: MID
ORIENTATION: MID;LOWER
ORIENTATION: MID
ORIENTATION: LOWER;MID
ORIENTATION: MID;LOWER
ORIENTATION: LOWER
ORIENTATION: MID;LOWER
ORIENTATION: MID;LOWER
ORIENTATION: LOWER

## 2025-01-26 ASSESSMENT — PAIN DESCRIPTION - PAIN TYPE
TYPE: CHRONIC PAIN

## 2025-01-26 ASSESSMENT — PAIN SCALES - GENERAL
PAINLEVEL_OUTOF10: 6
PAINLEVEL_OUTOF10: 7
PAINLEVEL_OUTOF10: 4
PAINLEVEL_OUTOF10: 7
PAINLEVEL_OUTOF10: 8
PAINLEVEL_OUTOF10: 8
PAINLEVEL_OUTOF10: 5
PAINLEVEL_OUTOF10: 7
PAINLEVEL_OUTOF10: 8
PAINLEVEL_OUTOF10: 7
PAINLEVEL_OUTOF10: 7
PAINLEVEL_OUTOF10: 6
PAINLEVEL_OUTOF10: 8
PAINLEVEL_OUTOF10: 6
PAINLEVEL_OUTOF10: 8
PAINLEVEL_OUTOF10: 8

## 2025-01-26 ASSESSMENT — PAIN DESCRIPTION - FREQUENCY
FREQUENCY: CONTINUOUS

## 2025-01-26 ASSESSMENT — PAIN DESCRIPTION - DESCRIPTORS
DESCRIPTORS: ACHING
DESCRIPTORS: ACHING;DISCOMFORT
DESCRIPTORS: ACHING

## 2025-01-26 ASSESSMENT — PAIN DESCRIPTION - ONSET
ONSET: ON-GOING

## 2025-01-27 PROBLEM — E43 SEVERE MALNUTRITION (HCC): Chronic | Status: ACTIVE | Noted: 2025-01-27

## 2025-01-27 LAB
ANION GAP SERPL CALC-SCNC: 13 MEQ/L (ref 8–16)
BUN SERPL-MCNC: 8 MG/DL (ref 7–22)
CALCIUM SERPL-MCNC: 9.4 MG/DL (ref 8.5–10.5)
CHLORIDE SERPL-SCNC: 95 MEQ/L (ref 98–111)
CO2 SERPL-SCNC: 27 MEQ/L (ref 23–33)
CREAT SERPL-MCNC: 0.4 MG/DL (ref 0.4–1.2)
GFR SERPL CREATININE-BSD FRML MDRD: > 90 ML/MIN/1.73M2
GLUCOSE SERPL-MCNC: 119 MG/DL (ref 70–108)
IGA SERPL-MCNC: 577 MG/DL (ref 70–400)
POTASSIUM SERPL-SCNC: 4.3 MEQ/L (ref 3.5–5.2)
SODIUM SERPL-SCNC: 135 MEQ/L (ref 135–145)

## 2025-01-27 PROCEDURE — 6360000002 HC RX W HCPCS

## 2025-01-27 PROCEDURE — 82784 ASSAY IGA/IGD/IGG/IGM EACH: CPT

## 2025-01-27 PROCEDURE — 2140000000 HC CCU INTERMEDIATE R&B

## 2025-01-27 PROCEDURE — 2500000003 HC RX 250 WO HCPCS

## 2025-01-27 PROCEDURE — 2500000003 HC RX 250 WO HCPCS: Performed by: STUDENT IN AN ORGANIZED HEALTH CARE EDUCATION/TRAINING PROGRAM

## 2025-01-27 PROCEDURE — 80048 BASIC METABOLIC PNL TOTAL CA: CPT

## 2025-01-27 PROCEDURE — 94761 N-INVAS EAR/PLS OXIMETRY MLT: CPT

## 2025-01-27 PROCEDURE — 6360000002 HC RX W HCPCS: Performed by: STUDENT IN AN ORGANIZED HEALTH CARE EDUCATION/TRAINING PROGRAM

## 2025-01-27 PROCEDURE — 83516 IMMUNOASSAY NONANTIBODY: CPT

## 2025-01-27 PROCEDURE — 6370000000 HC RX 637 (ALT 250 FOR IP)

## 2025-01-27 PROCEDURE — 6370000000 HC RX 637 (ALT 250 FOR IP): Performed by: NURSE PRACTITIONER

## 2025-01-27 PROCEDURE — 6370000000 HC RX 637 (ALT 250 FOR IP): Performed by: STUDENT IN AN ORGANIZED HEALTH CARE EDUCATION/TRAINING PROGRAM

## 2025-01-27 PROCEDURE — 99233 SBSQ HOSP IP/OBS HIGH 50: CPT | Performed by: STUDENT IN AN ORGANIZED HEALTH CARE EDUCATION/TRAINING PROGRAM

## 2025-01-27 PROCEDURE — 94669 MECHANICAL CHEST WALL OSCILL: CPT

## 2025-01-27 PROCEDURE — 86231 EMA EACH IG CLASS: CPT

## 2025-01-27 PROCEDURE — 36415 COLL VENOUS BLD VENIPUNCTURE: CPT

## 2025-01-27 RX ORDER — SENNOSIDES A AND B 8.6 MG/1
15 TABLET, FILM COATED ORAL ONCE
Status: COMPLETED | OUTPATIENT
Start: 2025-01-27 | End: 2025-01-27

## 2025-01-27 RX ORDER — POLYETHYLENE GLYCOL 3350 17 G/17G
238 POWDER, FOR SOLUTION ORAL ONCE
Status: COMPLETED | OUTPATIENT
Start: 2025-01-27 | End: 2025-01-27

## 2025-01-27 RX ORDER — CARVEDILOL 3.12 MG/1
3.12 TABLET ORAL 2 TIMES DAILY WITH MEALS
Status: DISCONTINUED | OUTPATIENT
Start: 2025-01-27 | End: 2025-02-05 | Stop reason: HOSPADM

## 2025-01-27 RX ORDER — SODIUM CHLORIDE 0.9 % (FLUSH) 0.9 %
5-40 SYRINGE (ML) INJECTION EVERY 12 HOURS SCHEDULED
Status: DISCONTINUED | OUTPATIENT
Start: 2025-01-27 | End: 2025-02-05 | Stop reason: HOSPADM

## 2025-01-27 RX ORDER — SODIUM CHLORIDE 0.9 % (FLUSH) 0.9 %
5-40 SYRINGE (ML) INJECTION PRN
Status: DISCONTINUED | OUTPATIENT
Start: 2025-01-27 | End: 2025-02-05 | Stop reason: HOSPADM

## 2025-01-27 RX ORDER — SODIUM CHLORIDE 9 MG/ML
25 INJECTION, SOLUTION INTRAVENOUS PRN
Status: DISCONTINUED | OUTPATIENT
Start: 2025-01-27 | End: 2025-02-05 | Stop reason: HOSPADM

## 2025-01-27 RX ADMIN — WATER 1000 MG: 1 INJECTION INTRAMUSCULAR; INTRAVENOUS; SUBCUTANEOUS at 10:56

## 2025-01-27 RX ADMIN — SUCRALFATE 1 G: 1 TABLET ORAL at 10:57

## 2025-01-27 RX ADMIN — PANTOPRAZOLE SODIUM 40 MG: 40 TABLET, DELAYED RELEASE ORAL at 16:32

## 2025-01-27 RX ADMIN — SODIUM CHLORIDE, PRESERVATIVE FREE 10 ML: 5 INJECTION INTRAVENOUS at 10:56

## 2025-01-27 RX ADMIN — BISACODYL 20 MG: 5 TABLET, COATED ORAL at 17:05

## 2025-01-27 RX ADMIN — OXYCODONE HYDROCHLORIDE AND ACETAMINOPHEN 1 TABLET: 5; 325 TABLET ORAL at 12:46

## 2025-01-27 RX ADMIN — Medication 1 CAPSULE: at 08:14

## 2025-01-27 RX ADMIN — SUCRALFATE 1 G: 1 TABLET ORAL at 16:32

## 2025-01-27 RX ADMIN — PANTOPRAZOLE SODIUM 40 MG: 40 TABLET, DELAYED RELEASE ORAL at 06:03

## 2025-01-27 RX ADMIN — CARVEDILOL 3.12 MG: 3.12 TABLET, FILM COATED ORAL at 16:32

## 2025-01-27 RX ADMIN — POLYETHYLENE GLYCOL 3350 238 G: 17 POWDER, FOR SOLUTION ORAL at 13:27

## 2025-01-27 RX ADMIN — BETHANECHOL CHLORIDE 25 MG: 25 TABLET ORAL at 08:14

## 2025-01-27 RX ADMIN — ROPINIROLE HYDROCHLORIDE 2 MG: 1 TABLET, FILM COATED ORAL at 20:43

## 2025-01-27 RX ADMIN — METOPROLOL SUCCINATE 25 MG: 25 TABLET, FILM COATED, EXTENDED RELEASE ORAL at 08:15

## 2025-01-27 RX ADMIN — MORPHINE SULFATE 0.5 MG: 2 INJECTION, SOLUTION INTRAMUSCULAR; INTRAVENOUS at 18:57

## 2025-01-27 RX ADMIN — OXYCODONE HYDROCHLORIDE AND ACETAMINOPHEN 1 TABLET: 5; 325 TABLET ORAL at 08:15

## 2025-01-27 RX ADMIN — SUCRALFATE 1 G: 1 TABLET ORAL at 06:03

## 2025-01-27 RX ADMIN — LEVOTHYROXINE SODIUM 50 MCG: 0.05 TABLET ORAL at 06:03

## 2025-01-27 RX ADMIN — ONDANSETRON 4 MG: 2 INJECTION INTRAMUSCULAR; INTRAVENOUS at 14:25

## 2025-01-27 RX ADMIN — ROPINIROLE HYDROCHLORIDE 2 MG: 1 TABLET, FILM COATED ORAL at 08:15

## 2025-01-27 RX ADMIN — SUCRALFATE 1 G: 1 TABLET ORAL at 21:26

## 2025-01-27 RX ADMIN — BISACODYL 20 MG: 5 TABLET, COATED ORAL at 13:27

## 2025-01-27 RX ADMIN — HYDROXYZINE HYDROCHLORIDE 25 MG: 25 TABLET ORAL at 13:34

## 2025-01-27 RX ADMIN — SENNOSIDES 129 MG: 8.6 TABLET, COATED ORAL at 13:26

## 2025-01-27 RX ADMIN — OXYCODONE HYDROCHLORIDE AND ACETAMINOPHEN 1 TABLET: 5; 325 TABLET ORAL at 21:27

## 2025-01-27 RX ADMIN — FLUTICASONE PROPIONATE 2 SPRAY: 50 SPRAY, METERED NASAL at 20:48

## 2025-01-27 RX ADMIN — BETHANECHOL CHLORIDE 25 MG: 25 TABLET ORAL at 15:01

## 2025-01-27 RX ADMIN — OXYCODONE HYDROCHLORIDE AND ACETAMINOPHEN 1 TABLET: 5; 325 TABLET ORAL at 17:04

## 2025-01-27 RX ADMIN — BETHANECHOL CHLORIDE 25 MG: 25 TABLET ORAL at 20:43

## 2025-01-27 ASSESSMENT — PAIN DESCRIPTION - ONSET: ONSET: ON-GOING

## 2025-01-27 ASSESSMENT — PAIN DESCRIPTION - LOCATION
LOCATION: BACK

## 2025-01-27 ASSESSMENT — PAIN - FUNCTIONAL ASSESSMENT
PAIN_FUNCTIONAL_ASSESSMENT: ACTIVITIES ARE NOT PREVENTED
PAIN_FUNCTIONAL_ASSESSMENT: PREVENTS OR INTERFERES SOME ACTIVE ACTIVITIES AND ADLS

## 2025-01-27 ASSESSMENT — PAIN DESCRIPTION - PAIN TYPE: TYPE: CHRONIC PAIN

## 2025-01-27 ASSESSMENT — PAIN SCALES - GENERAL
PAINLEVEL_OUTOF10: 5
PAINLEVEL_OUTOF10: 7
PAINLEVEL_OUTOF10: 10
PAINLEVEL_OUTOF10: 2
PAINLEVEL_OUTOF10: 6
PAINLEVEL_OUTOF10: 8
PAINLEVEL_OUTOF10: 9
PAINLEVEL_OUTOF10: 0
PAINLEVEL_OUTOF10: 9

## 2025-01-27 ASSESSMENT — PAIN DESCRIPTION - DESCRIPTORS
DESCRIPTORS: SHARP
DESCRIPTORS: ACHING;SHARP
DESCRIPTORS: SHARP;SHOOTING
DESCRIPTORS: SHARP
DESCRIPTORS: SHARP

## 2025-01-27 ASSESSMENT — PAIN DESCRIPTION - ORIENTATION
ORIENTATION: MID
ORIENTATION: MID;LOWER
ORIENTATION: MID

## 2025-01-27 ASSESSMENT — PAIN DESCRIPTION - FREQUENCY: FREQUENCY: CONTINUOUS

## 2025-01-28 ENCOUNTER — APPOINTMENT (OUTPATIENT)
Dept: CT IMAGING | Age: 80
DRG: 871 | End: 2025-01-28
Attending: STUDENT IN AN ORGANIZED HEALTH CARE EDUCATION/TRAINING PROGRAM
Payer: MEDICARE

## 2025-01-28 ENCOUNTER — ANESTHESIA (OUTPATIENT)
Dept: ENDOSCOPY | Age: 80
End: 2025-01-28
Payer: MEDICARE

## 2025-01-28 ENCOUNTER — ANESTHESIA EVENT (OUTPATIENT)
Dept: ENDOSCOPY | Age: 80
End: 2025-01-28
Payer: MEDICARE

## 2025-01-28 ENCOUNTER — APPOINTMENT (OUTPATIENT)
Dept: ENDOSCOPY | Age: 80
DRG: 871 | End: 2025-01-28
Attending: INTERNAL MEDICINE
Payer: MEDICARE

## 2025-01-28 LAB
ANION GAP SERPL CALC-SCNC: 13 MEQ/L (ref 8–16)
BUN SERPL-MCNC: 11 MG/DL (ref 7–22)
CALCIUM SERPL-MCNC: 9 MG/DL (ref 8.5–10.5)
CHLORIDE SERPL-SCNC: 95 MEQ/L (ref 98–111)
CO2 SERPL-SCNC: 21 MEQ/L (ref 23–33)
CREAT SERPL-MCNC: 0.5 MG/DL (ref 0.4–1.2)
DEPRECATED RDW RBC AUTO: 51.2 FL (ref 35–45)
EKG ATRIAL RATE: 121 BPM
EKG P AXIS: 77 DEGREES
EKG P-R INTERVAL: 154 MS
EKG Q-T INTERVAL: 338 MS
EKG QRS DURATION: 88 MS
EKG QTC CALCULATION (BAZETT): 479 MS
EKG R AXIS: 36 DEGREES
EKG T AXIS: 75 DEGREES
EKG VENTRICULAR RATE: 121 BPM
ERYTHROCYTE [DISTWIDTH] IN BLOOD BY AUTOMATED COUNT: 16.2 % (ref 11.5–14.5)
GFR SERPL CREATININE-BSD FRML MDRD: > 90 ML/MIN/1.73M2
GLUCOSE SERPL-MCNC: 77 MG/DL (ref 70–108)
HCT VFR BLD AUTO: 30 % (ref 37–47)
HGB BLD-MCNC: 9.1 GM/DL (ref 12–16)
MCH RBC QN AUTO: 26.4 PG (ref 26–33)
MCHC RBC AUTO-ENTMCNC: 30.3 GM/DL (ref 32.2–35.5)
MCV RBC AUTO: 87 FL (ref 81–99)
PLATELET # BLD AUTO: 614 THOU/MM3 (ref 130–400)
PMV BLD AUTO: 8.8 FL (ref 9.4–12.4)
POTASSIUM SERPL-SCNC: 3.9 MEQ/L (ref 3.5–5.2)
RBC # BLD AUTO: 3.45 MILL/MM3 (ref 4.2–5.4)
SODIUM SERPL-SCNC: 129 MEQ/L (ref 135–145)
SODIUM SERPL-SCNC: 133 MEQ/L (ref 135–145)
TTG IGA SER IA-ACNC: 2.2 U/ML
TTG IGG SER IA-ACNC: < 0.6 U/ML
WBC # BLD AUTO: 11.2 THOU/MM3 (ref 4.8–10.8)

## 2025-01-28 PROCEDURE — 99232 SBSQ HOSP IP/OBS MODERATE 35: CPT | Performed by: PHYSICIAN ASSISTANT

## 2025-01-28 PROCEDURE — 6360000002 HC RX W HCPCS

## 2025-01-28 PROCEDURE — 6370000000 HC RX 637 (ALT 250 FOR IP): Performed by: NURSE PRACTITIONER

## 2025-01-28 PROCEDURE — 6360000002 HC RX W HCPCS: Performed by: STUDENT IN AN ORGANIZED HEALTH CARE EDUCATION/TRAINING PROGRAM

## 2025-01-28 PROCEDURE — 2140000000 HC CCU INTERMEDIATE R&B

## 2025-01-28 PROCEDURE — 6360000002 HC RX W HCPCS: Performed by: NURSE PRACTITIONER

## 2025-01-28 PROCEDURE — 36415 COLL VENOUS BLD VENIPUNCTURE: CPT

## 2025-01-28 PROCEDURE — 72131 CT LUMBAR SPINE W/O DYE: CPT

## 2025-01-28 PROCEDURE — 6370000000 HC RX 637 (ALT 250 FOR IP)

## 2025-01-28 PROCEDURE — 93005 ELECTROCARDIOGRAM TRACING: CPT | Performed by: NURSE PRACTITIONER

## 2025-01-28 PROCEDURE — 85027 COMPLETE CBC AUTOMATED: CPT

## 2025-01-28 PROCEDURE — 80048 BASIC METABOLIC PNL TOTAL CA: CPT

## 2025-01-28 PROCEDURE — 2500000003 HC RX 250 WO HCPCS: Performed by: STUDENT IN AN ORGANIZED HEALTH CARE EDUCATION/TRAINING PROGRAM

## 2025-01-28 PROCEDURE — 6370000000 HC RX 637 (ALT 250 FOR IP): Performed by: STUDENT IN AN ORGANIZED HEALTH CARE EDUCATION/TRAINING PROGRAM

## 2025-01-28 PROCEDURE — 2500000003 HC RX 250 WO HCPCS

## 2025-01-28 PROCEDURE — 84295 ASSAY OF SERUM SODIUM: CPT

## 2025-01-28 PROCEDURE — 6370000000 HC RX 637 (ALT 250 FOR IP): Performed by: INTERNAL MEDICINE

## 2025-01-28 RX ORDER — PANTOPRAZOLE SODIUM 40 MG/10ML
40 INJECTION, POWDER, LYOPHILIZED, FOR SOLUTION INTRAVENOUS DAILY
Status: DISCONTINUED | OUTPATIENT
Start: 2025-01-28 | End: 2025-01-29

## 2025-01-28 RX ORDER — METOPROLOL TARTRATE 1 MG/ML
5 INJECTION, SOLUTION INTRAVENOUS EVERY 6 HOURS
Status: DISCONTINUED | OUTPATIENT
Start: 2025-01-28 | End: 2025-01-29

## 2025-01-28 RX ADMIN — OXYCODONE HYDROCHLORIDE AND ACETAMINOPHEN 1 TABLET: 5; 325 TABLET ORAL at 22:55

## 2025-01-28 RX ADMIN — OXYCODONE HYDROCHLORIDE AND ACETAMINOPHEN 1 TABLET: 5; 325 TABLET ORAL at 16:47

## 2025-01-28 RX ADMIN — SUCRALFATE 1 G: 1 TABLET ORAL at 06:37

## 2025-01-28 RX ADMIN — MAJOR MAGNESIUM CITRATE ORAL SOLUTION - LEMON 296 ML: 1.75 LIQUID ORAL at 16:10

## 2025-01-28 RX ADMIN — CHLORDIAZEPOXIDE HYDROCHLORIDE 5 MG: 5 CAPSULE ORAL at 00:39

## 2025-01-28 RX ADMIN — MORPHINE SULFATE 0.5 MG: 2 INJECTION, SOLUTION INTRAMUSCULAR; INTRAVENOUS at 00:19

## 2025-01-28 RX ADMIN — CHLORDIAZEPOXIDE HYDROCHLORIDE 5 MG: 5 CAPSULE ORAL at 09:47

## 2025-01-28 RX ADMIN — WATER 1000 MG: 1 INJECTION INTRAMUSCULAR; INTRAVENOUS; SUBCUTANEOUS at 10:39

## 2025-01-28 RX ADMIN — PANTOPRAZOLE SODIUM 40 MG: 40 TABLET, DELAYED RELEASE ORAL at 06:37

## 2025-01-28 RX ADMIN — SODIUM CHLORIDE, PRESERVATIVE FREE 10 ML: 5 INJECTION INTRAVENOUS at 09:49

## 2025-01-28 RX ADMIN — FLUTICASONE PROPIONATE 2 SPRAY: 50 SPRAY, METERED NASAL at 09:48

## 2025-01-28 RX ADMIN — MORPHINE SULFATE 0.5 MG: 2 INJECTION, SOLUTION INTRAMUSCULAR; INTRAVENOUS at 06:31

## 2025-01-28 RX ADMIN — LEVOTHYROXINE SODIUM 50 MCG: 0.05 TABLET ORAL at 06:37

## 2025-01-28 RX ADMIN — PANTOPRAZOLE SODIUM 40 MG: 40 INJECTION, POWDER, FOR SOLUTION INTRAVENOUS at 20:43

## 2025-01-28 RX ADMIN — MAGNESIUM HYDROXIDE 30 ML: 1200 LIQUID ORAL at 20:38

## 2025-01-28 RX ADMIN — DOCUSATE SODIUM 283 MG: 283 LIQUID RECTAL at 21:48

## 2025-01-28 RX ADMIN — OXYCODONE HYDROCHLORIDE AND ACETAMINOPHEN 1 TABLET: 5; 325 TABLET ORAL at 04:01

## 2025-01-28 RX ADMIN — SODIUM CHLORIDE, PRESERVATIVE FREE 10 ML: 5 INJECTION INTRAVENOUS at 20:49

## 2025-01-28 RX ADMIN — CHLORDIAZEPOXIDE HYDROCHLORIDE 5 MG: 5 CAPSULE ORAL at 22:55

## 2025-01-28 RX ADMIN — OXYCODONE HYDROCHLORIDE AND ACETAMINOPHEN 1 TABLET: 5; 325 TABLET ORAL at 09:44

## 2025-01-28 ASSESSMENT — PAIN SCALES - GENERAL
PAINLEVEL_OUTOF10: 2
PAINLEVEL_OUTOF10: 10
PAINLEVEL_OUTOF10: 8
PAINLEVEL_OUTOF10: 8
PAINLEVEL_OUTOF10: 7
PAINLEVEL_OUTOF10: 9
PAINLEVEL_OUTOF10: 9
PAINLEVEL_OUTOF10: 8

## 2025-01-28 ASSESSMENT — PAIN DESCRIPTION - ONSET
ONSET: ON-GOING

## 2025-01-28 ASSESSMENT — PAIN DESCRIPTION - DESCRIPTORS
DESCRIPTORS: SHARP
DESCRIPTORS: SHARP;SHOOTING

## 2025-01-28 ASSESSMENT — PAIN DESCRIPTION - FREQUENCY
FREQUENCY: CONTINUOUS

## 2025-01-28 ASSESSMENT — PAIN DESCRIPTION - LOCATION
LOCATION: BACK
LOCATION: BACK;UMBILICUS
LOCATION: BACK
LOCATION: BACK

## 2025-01-28 ASSESSMENT — PAIN DESCRIPTION - ORIENTATION
ORIENTATION: LOWER
ORIENTATION: MID
ORIENTATION: LOWER
ORIENTATION: LOWER
ORIENTATION: MID
ORIENTATION: MID

## 2025-01-28 ASSESSMENT — PAIN SCALES - WONG BAKER
WONGBAKER_NUMERICALRESPONSE: NO HURT
WONGBAKER_NUMERICALRESPONSE: NO HURT

## 2025-01-28 ASSESSMENT — PAIN DESCRIPTION - PAIN TYPE
TYPE: CHRONIC PAIN

## 2025-01-28 ASSESSMENT — PAIN - FUNCTIONAL ASSESSMENT
PAIN_FUNCTIONAL_ASSESSMENT: ACTIVITIES ARE NOT PREVENTED

## 2025-01-28 NOTE — ANESTHESIA PRE PROCEDURE
Department of Anesthesiology  Preprocedure Note       Name:  Letty Edge   Age:  79 y.o.  :  1945                                          MRN:  [id number]         Date:  2025      Surgeon: Surgeon(s):  Sandor Johnson MD    Procedure: Procedure(s):  COLONOSCOPY    Medications prior to admission:   Prior to Admission medications    Medication Sig Start Date End Date Taking? Authorizing Provider   fentaNYL (DURAGESIC) 50 MCG/HR Place 1 patch onto the skin every 72 hours.   Yes Jordan Kramer MD   rOPINIRole (REQUIP) 2 MG tablet Take 1 tablet by mouth in the morning and at bedtime   Yes Jordan Kramer MD   oxyCODONE-acetaminophen (PERCOCET) 5-325 MG per tablet Take 1 tablet by mouth every 6 hours as needed for Pain.   Yes Jordan Kramer MD   chlordiazePOXIDE (LIBRIUM) 5 MG capsule Take 1 capsule by mouth 2 times daily as needed for Anxiety.   Yes Jordan Kramer MD   ferrous sulfate 325 (65 Fe) MG tablet Take 1 tablet by mouth daily (with breakfast)   Yes Jordan Kramer MD   MEGESTROL ACETATE PO Take 20 mg by mouth 2 times daily    Jordan Kramer MD   bethanechol (URECHOLINE) 25 MG tablet Take 1 tablet by mouth 3 times daily    Jordan Kramer MD   cyclobenzaprine (FLEXERIL) 10 MG tablet Take 1 tablet by mouth 3 times daily as needed for Muscle spasms    Jordan Kramer MD   meloxicam (MOBIC) 7.5 MG tablet Take 1 tablet by mouth daily  Patient not taking: Reported on 2025    Jordan Kramer MD   Ibuprofen (MOTRIN PO) Take 200 mg by mouth 3 times daily Takes with Percocet  Patient not taking: Reported on 2025    Jordan Kramer MD   pantoprazole (PROTONIX) 40 MG tablet Take 1 tablet by mouth daily    Jordan Kramer MD   levothyroxine (SYNTHROID) 50 MCG tablet Take 1 tablet by mouth Daily    Jordan Kramer MD   potassium chloride (MICRO-K) 10 MEQ extended release capsule Take 1 capsule by mouth 2 times daily

## 2025-01-28 NOTE — PROCEDURES
PROCEDURE NOTE  Date: 1/28/2025   Name: Letty Edge  YOB: 1945    Procedures  12 lead EKG completed. Results handed to Mely GREEN.

## 2025-01-29 ENCOUNTER — APPOINTMENT (OUTPATIENT)
Dept: ENDOSCOPY | Age: 80
DRG: 871 | End: 2025-01-29
Attending: INTERNAL MEDICINE
Payer: MEDICARE

## 2025-01-29 ENCOUNTER — APPOINTMENT (OUTPATIENT)
Dept: GENERAL RADIOLOGY | Age: 80
DRG: 871 | End: 2025-01-29
Attending: STUDENT IN AN ORGANIZED HEALTH CARE EDUCATION/TRAINING PROGRAM
Payer: MEDICARE

## 2025-01-29 LAB
ANION GAP SERPL CALC-SCNC: 17 MEQ/L (ref 8–16)
BUN SERPL-MCNC: 20 MG/DL (ref 7–22)
CALCIUM SERPL-MCNC: 8.9 MG/DL (ref 8.5–10.5)
CHLORIDE SERPL-SCNC: 98 MEQ/L (ref 98–111)
CO2 SERPL-SCNC: 21 MEQ/L (ref 23–33)
CREAT SERPL-MCNC: 0.7 MG/DL (ref 0.4–1.2)
DEPRECATED RDW RBC AUTO: 49.8 FL (ref 35–45)
ERYTHROCYTE [DISTWIDTH] IN BLOOD BY AUTOMATED COUNT: 15.9 % (ref 11.5–14.5)
GFR SERPL CREATININE-BSD FRML MDRD: 86 ML/MIN/1.73M2
GLUCOSE SERPL-MCNC: 78 MG/DL (ref 70–108)
HCT VFR BLD AUTO: 32.6 % (ref 37–47)
HGB BLD-MCNC: 10.1 GM/DL (ref 12–16)
MCH RBC QN AUTO: 26.9 PG (ref 26–33)
MCHC RBC AUTO-ENTMCNC: 31 GM/DL (ref 32.2–35.5)
MCV RBC AUTO: 86.7 FL (ref 81–99)
PLATELET # BLD AUTO: 558 THOU/MM3 (ref 130–400)
PMV BLD AUTO: 9.1 FL (ref 9.4–12.4)
POTASSIUM SERPL-SCNC: 3.7 MEQ/L (ref 3.5–5.2)
RBC # BLD AUTO: 3.76 MILL/MM3 (ref 4.2–5.4)
SODIUM SERPL-SCNC: 136 MEQ/L (ref 135–145)
WBC # BLD AUTO: 5 THOU/MM3 (ref 4.8–10.8)

## 2025-01-29 PROCEDURE — 0DBM8ZX EXCISION OF DESCENDING COLON, VIA NATURAL OR ARTIFICIAL OPENING ENDOSCOPIC, DIAGNOSTIC: ICD-10-PCS | Performed by: INTERNAL MEDICINE

## 2025-01-29 PROCEDURE — 2580000003 HC RX 258

## 2025-01-29 PROCEDURE — 2709999900 HC NON-CHARGEABLE SUPPLY: Performed by: INTERNAL MEDICINE

## 2025-01-29 PROCEDURE — 2140000000 HC CCU INTERMEDIATE R&B

## 2025-01-29 PROCEDURE — 6370000000 HC RX 637 (ALT 250 FOR IP)

## 2025-01-29 PROCEDURE — 72114 X-RAY EXAM L-S SPINE BENDING: CPT

## 2025-01-29 PROCEDURE — 99232 SBSQ HOSP IP/OBS MODERATE 35: CPT | Performed by: PHYSICIAN ASSISTANT

## 2025-01-29 PROCEDURE — 6360000002 HC RX W HCPCS

## 2025-01-29 PROCEDURE — 6360000002 HC RX W HCPCS: Performed by: NURSE ANESTHETIST, CERTIFIED REGISTERED

## 2025-01-29 PROCEDURE — 85027 COMPLETE CBC AUTOMATED: CPT

## 2025-01-29 PROCEDURE — 2500000003 HC RX 250 WO HCPCS

## 2025-01-29 PROCEDURE — 6370000000 HC RX 637 (ALT 250 FOR IP): Performed by: INTERNAL MEDICINE

## 2025-01-29 PROCEDURE — 36415 COLL VENOUS BLD VENIPUNCTURE: CPT

## 2025-01-29 PROCEDURE — 7100000011 HC PHASE II RECOVERY - ADDTL 15 MIN: Performed by: INTERNAL MEDICINE

## 2025-01-29 PROCEDURE — 3609010300 HC COLONOSCOPY W/BIOPSY SINGLE/MULTIPLE: Performed by: INTERNAL MEDICINE

## 2025-01-29 PROCEDURE — 88305 TISSUE EXAM BY PATHOLOGIST: CPT

## 2025-01-29 PROCEDURE — 2500000003 HC RX 250 WO HCPCS: Performed by: NURSE PRACTITIONER

## 2025-01-29 PROCEDURE — 6360000002 HC RX W HCPCS: Performed by: NURSE PRACTITIONER

## 2025-01-29 PROCEDURE — 80048 BASIC METABOLIC PNL TOTAL CA: CPT

## 2025-01-29 PROCEDURE — 3700000001 HC ADD 15 MINUTES (ANESTHESIA): Performed by: INTERNAL MEDICINE

## 2025-01-29 PROCEDURE — 7100000010 HC PHASE II RECOVERY - FIRST 15 MIN: Performed by: INTERNAL MEDICINE

## 2025-01-29 PROCEDURE — 3700000000 HC ANESTHESIA ATTENDED CARE: Performed by: INTERNAL MEDICINE

## 2025-01-29 RX ORDER — GLYCOPYRROLATE 1 MG/5 ML
SYRINGE (ML) INTRAVENOUS
Status: DISCONTINUED | OUTPATIENT
Start: 2025-01-29 | End: 2025-01-29 | Stop reason: SDUPTHER

## 2025-01-29 RX ORDER — POTASSIUM CHLORIDE 750 MG/1
10 TABLET, EXTENDED RELEASE ORAL 2 TIMES DAILY
Status: DISCONTINUED | OUTPATIENT
Start: 2025-01-29 | End: 2025-02-05 | Stop reason: HOSPADM

## 2025-01-29 RX ADMIN — METOPROLOL TARTRATE 5 MG: 5 INJECTION INTRAVENOUS at 10:19

## 2025-01-29 RX ADMIN — MAJOR MAGNESIUM CITRATE ORAL SOLUTION - LEMON 148 ML: 1.75 LIQUID ORAL at 10:18

## 2025-01-29 RX ADMIN — SODIUM CHLORIDE: 9 INJECTION, SOLUTION INTRAVENOUS at 13:34

## 2025-01-29 RX ADMIN — PHENYLEPHRINE HYDROCHLORIDE 100 MCG: 10 INJECTION INTRAVENOUS at 14:13

## 2025-01-29 RX ADMIN — MORPHINE SULFATE 0.5 MG: 2 INJECTION, SOLUTION INTRAMUSCULAR; INTRAVENOUS at 05:36

## 2025-01-29 RX ADMIN — Medication 0.2 MG: at 14:13

## 2025-01-29 RX ADMIN — Medication 3 MG: at 22:06

## 2025-01-29 RX ADMIN — POTASSIUM CHLORIDE 10 MEQ: 750 TABLET, FILM COATED, EXTENDED RELEASE ORAL at 20:06

## 2025-01-29 RX ADMIN — OXYCODONE HYDROCHLORIDE AND ACETAMINOPHEN 1 TABLET: 5; 325 TABLET ORAL at 17:29

## 2025-01-29 RX ADMIN — CHLORDIAZEPOXIDE HYDROCHLORIDE 5 MG: 5 CAPSULE ORAL at 22:06

## 2025-01-29 RX ADMIN — PROPOFOL 20 MG: 10 INJECTION, EMULSION INTRAVENOUS at 13:27

## 2025-01-29 RX ADMIN — OXYCODONE HYDROCHLORIDE AND ACETAMINOPHEN 1 TABLET: 5; 325 TABLET ORAL at 21:56

## 2025-01-29 RX ADMIN — PROPOFOL 30 MG: 10 INJECTION, EMULSION INTRAVENOUS at 13:45

## 2025-01-29 RX ADMIN — ACETAMINOPHEN 650 MG: 325 TABLET ORAL at 11:40

## 2025-01-29 RX ADMIN — SODIUM CHLORIDE, PRESERVATIVE FREE 10 ML: 5 INJECTION INTRAVENOUS at 10:20

## 2025-01-29 RX ADMIN — PROPOFOL 30 MG: 10 INJECTION, EMULSION INTRAVENOUS at 13:52

## 2025-01-29 RX ADMIN — PANTOPRAZOLE SODIUM 40 MG: 40 INJECTION, POWDER, FOR SOLUTION INTRAVENOUS at 10:19

## 2025-01-29 RX ADMIN — PHENYLEPHRINE HYDROCHLORIDE 100 MCG: 10 INJECTION INTRAVENOUS at 14:08

## 2025-01-29 RX ADMIN — MORPHINE SULFATE 0.5 MG: 2 INJECTION, SOLUTION INTRAMUSCULAR; INTRAVENOUS at 10:16

## 2025-01-29 RX ADMIN — PHENYLEPHRINE HYDROCHLORIDE 100 MCG: 10 INJECTION INTRAVENOUS at 13:53

## 2025-01-29 ASSESSMENT — PAIN DESCRIPTION - LOCATION
LOCATION: BACK

## 2025-01-29 ASSESSMENT — PAIN SCALES - GENERAL
PAINLEVEL_OUTOF10: 10
PAINLEVEL_OUTOF10: 3
PAINLEVEL_OUTOF10: 10
PAINLEVEL_OUTOF10: 9
PAINLEVEL_OUTOF10: 8

## 2025-01-29 ASSESSMENT — PAIN DESCRIPTION - PAIN TYPE
TYPE: CHRONIC PAIN

## 2025-01-29 ASSESSMENT — PAIN DESCRIPTION - DESCRIPTORS
DESCRIPTORS: SHARP
DESCRIPTORS: SHARP;SHOOTING

## 2025-01-29 ASSESSMENT — PAIN DESCRIPTION - ORIENTATION
ORIENTATION: LOWER
ORIENTATION: LOWER
ORIENTATION: LOWER;MID
ORIENTATION: RIGHT
ORIENTATION: MID;LOWER

## 2025-01-29 ASSESSMENT — PAIN DESCRIPTION - FREQUENCY
FREQUENCY: CONTINUOUS
FREQUENCY: CONTINUOUS

## 2025-01-29 ASSESSMENT — PAIN DESCRIPTION - ONSET
ONSET: ON-GOING
ONSET: ON-GOING

## 2025-01-29 ASSESSMENT — PAIN SCALES - WONG BAKER
WONGBAKER_NUMERICALRESPONSE: NO HURT
WONGBAKER_NUMERICALRESPONSE: NO HURT

## 2025-01-29 ASSESSMENT — PAIN - FUNCTIONAL ASSESSMENT
PAIN_FUNCTIONAL_ASSESSMENT: 0-10
PAIN_FUNCTIONAL_ASSESSMENT: ACTIVITIES ARE NOT PREVENTED
PAIN_FUNCTIONAL_ASSESSMENT: PREVENTS OR INTERFERES SOME ACTIVE ACTIVITIES AND ADLS
PAIN_FUNCTIONAL_ASSESSMENT: PREVENTS OR INTERFERES SOME ACTIVE ACTIVITIES AND ADLS
PAIN_FUNCTIONAL_ASSESSMENT: 0-10

## 2025-01-29 NOTE — BRIEF OP NOTE
Brief Postoperative Note      Patient: Letty Edge  YOB: 1945  MRN: [id number]    Date of Procedure: 1/29/2025    Pre-Op Diagnosis Codes:      * Iron deficiency anemia, unspecified iron deficiency anemia type [D50.9]    Post-Op Diagnosis: Same       Procedure(s):  COLONOSCOPY BIOPSY    Surgeon(s):  Sandor Johnson MD    Assistant:  * No surgical staff found *    Anesthesia: Monitor Anesthesia Care    Estimated Blood Loss (mL): Minimal    Complications: None    Specimens:   ID Type Source Tests Collected by Time Destination   A : Bx descending r/o ischemia Tissue Colon SURGICAL PATHOLOGY Sandor Johnson MD 1/29/2025 1416        Implants:  * No implants in log *      Drains: * No LDAs found *    Findings:  Infection Present At Time Of Surgery (PATOS) (choose all levels that have infection present):  No infection present  Other Findings: above    Electronically signed by Sandor Johnson MD on 1/29/2025 at 2:25 PM

## 2025-01-29 NOTE — OP NOTE
Operative Note      Patient: Letty Edge  YOB: 1945  MRN: [id number]    Date of Procedure: 1/29/2025    Pre-Op Diagnosis Codes:      * Iron deficiency anemia, unspecified iron deficiency anemia type [D50.9]    Post-Op Diagnosis: Same::  Colonoscopy to cecum:  sigmoid thickening and diverticula.  Desc. Colon with probable iscjemia (biopsied).  Advance to liquid diet;  watch for further bleeding.       Procedure(s):  COLONOSCOPY BIOPSY    Surgeon(s):  Sandor Johnson MD    Assistant:   * No surgical staff found *    Anesthesia: Monitor Anesthesia Care    Estimated Blood Loss (mL): Minimal    Complications: None    Specimens:   ID Type Source Tests Collected by Time Destination   A : Bx descending r/o ischemia Tissue Colon SURGICAL PATHOLOGY Sandor Johnson MD 1/29/2025 1416        Implants:  * No implants in log *      Drains: * No LDAs found *    Findings:  Infection Present At Time Of Surgery (PATOS) (choose all levels that have infection present):  No infection present  Other Findings: above    Detailed Description of Procedure:   dictated    Electronically signed by Sandor Johnson MD on 1/29/2025 at 2:27 PM

## 2025-01-29 NOTE — ANESTHESIA POSTPROCEDURE EVALUATION
Department of Anesthesiology  Postprocedure Note    Patient: Letty Edge  MRN: [id number]  YOB: 1945  Date of evaluation: 1/29/2025    Procedure Summary       Date: 01/29/25 Room / Location: [place] [address] / [facility]    Anesthesia Start: 1334 Anesthesia Stop: 1423    Procedure: COLONOSCOPY BIOPSY Diagnosis:       Iron deficiency anemia, unspecified iron deficiency anemia type      (Iron deficiency anemia, unspecified iron deficiency anemia type [D50.9])    Surgeons: Sandor Johnson MD Responsible Provider: Noah Jimenez MD    Anesthesia Type: MAC ASA Status: 3            Anesthesia Type: No value filed.    Anibal Phase I: Anibal Score: 10    Anibal Phase II: Anibal Score: 10    Anesthesia Post Evaluation    Patient location during evaluation: bedside  Patient participation: complete - patient participated  Level of consciousness: awake and alert  Airway patency: patent  Nausea & Vomiting: no nausea and no vomiting  Cardiovascular status: hemodynamically stable  Respiratory status: acceptable and spontaneous ventilation  Hydration status: stable  Pain management: adequate        No notable events documented.

## 2025-01-29 NOTE — ANESTHESIA PRE PROCEDURE
Department of Anesthesiology  Preprocedure Note       Name:  Letty Edge   Age:  79 y.o.  :  1945                                          MRN:  [id number]         Date:  2025      Surgeon: Surgeon(s):  Sandor Johnson MD    Procedure: Procedure(s):  COLONOSCOPY    Medications prior to admission:   Prior to Admission medications    Medication Sig Start Date End Date Taking? Authorizing Provider   fentaNYL (DURAGESIC) 50 MCG/HR Place 1 patch onto the skin every 72 hours.   Yes Jordan Kramer MD   rOPINIRole (REQUIP) 2 MG tablet Take 1 tablet by mouth in the morning and at bedtime   Yes Jordan Kramer MD   oxyCODONE-acetaminophen (PERCOCET) 5-325 MG per tablet Take 1 tablet by mouth every 6 hours as needed for Pain.   Yes Jordan Kramer MD   chlordiazePOXIDE (LIBRIUM) 5 MG capsule Take 1 capsule by mouth 2 times daily as needed for Anxiety.   Yes Jordan Kramer MD   ferrous sulfate 325 (65 Fe) MG tablet Take 1 tablet by mouth daily (with breakfast)   Yes Jordan Kramer MD   MEGESTROL ACETATE PO Take 20 mg by mouth 2 times daily    Jordan Kramer MD   bethanechol (URECHOLINE) 25 MG tablet Take 1 tablet by mouth 3 times daily    Jordan Kramer MD   cyclobenzaprine (FLEXERIL) 10 MG tablet Take 1 tablet by mouth 3 times daily as needed for Muscle spasms    Jordan Kramer MD   meloxicam (MOBIC) 7.5 MG tablet Take 1 tablet by mouth daily  Patient not taking: Reported on 2025    Jordan Kramer MD   Ibuprofen (MOTRIN PO) Take 200 mg by mouth 3 times daily Takes with Percocet  Patient not taking: Reported on 2025    Jordan Kramer MD   pantoprazole (PROTONIX) 40 MG tablet Take 1 tablet by mouth daily    Jordan Kramer MD   levothyroxine (SYNTHROID) 50 MCG tablet Take 1 tablet by mouth Daily    Jordan Kramer MD   potassium chloride (MICRO-K) 10 MEQ extended release capsule Take 1 capsule by mouth 2 times daily

## 2025-01-30 LAB
DEPRECATED RDW RBC AUTO: 49.5 FL (ref 35–45)
ELASTASE PANC STL-MCNT: 673 UG/G
ENDOMYSIUM IGA TITR SER IF: NORMAL {TITER}
ERYTHROCYTE [DISTWIDTH] IN BLOOD BY AUTOMATED COUNT: 15.8 % (ref 11.5–14.5)
HCT VFR BLD AUTO: 31.5 % (ref 37–47)
HGB BLD-MCNC: 9.8 GM/DL (ref 12–16)
MCH RBC QN AUTO: 26.8 PG (ref 26–33)
MCHC RBC AUTO-ENTMCNC: 31.1 GM/DL (ref 32.2–35.5)
MCV RBC AUTO: 86.1 FL (ref 81–99)
PLATELET # BLD AUTO: 627 THOU/MM3 (ref 130–400)
PMV BLD AUTO: 9.3 FL (ref 9.4–12.4)
RBC # BLD AUTO: 3.66 MILL/MM3 (ref 4.2–5.4)
T4 FREE SERPL-MCNC: 1.05 NG/DL (ref 0.93–1.68)
TSH SERPL DL<=0.005 MIU/L-ACNC: 16.03 UIU/ML (ref 0.4–4.2)
WBC # BLD AUTO: 3.9 THOU/MM3 (ref 4.8–10.8)

## 2025-01-30 PROCEDURE — 6370000000 HC RX 637 (ALT 250 FOR IP): Performed by: PHYSICIAN ASSISTANT

## 2025-01-30 PROCEDURE — 97530 THERAPEUTIC ACTIVITIES: CPT

## 2025-01-30 PROCEDURE — 6370000000 HC RX 637 (ALT 250 FOR IP)

## 2025-01-30 PROCEDURE — 6370000000 HC RX 637 (ALT 250 FOR IP): Performed by: INTERNAL MEDICINE

## 2025-01-30 PROCEDURE — 85027 COMPLETE CBC AUTOMATED: CPT

## 2025-01-30 PROCEDURE — 99232 SBSQ HOSP IP/OBS MODERATE 35: CPT | Performed by: PHYSICIAN ASSISTANT

## 2025-01-30 PROCEDURE — 2500000003 HC RX 250 WO HCPCS: Performed by: INTERNAL MEDICINE

## 2025-01-30 PROCEDURE — 84439 ASSAY OF FREE THYROXINE: CPT

## 2025-01-30 PROCEDURE — 36415 COLL VENOUS BLD VENIPUNCTURE: CPT

## 2025-01-30 PROCEDURE — APPSS60 APP SPLIT SHARED TIME 46-60 MINUTES: Performed by: PHYSICIAN ASSISTANT

## 2025-01-30 PROCEDURE — 1200000003 HC TELEMETRY R&B

## 2025-01-30 PROCEDURE — 84443 ASSAY THYROID STIM HORMONE: CPT

## 2025-01-30 RX ADMIN — FLUTICASONE PROPIONATE 2 SPRAY: 50 SPRAY, METERED NASAL at 22:59

## 2025-01-30 RX ADMIN — SUCRALFATE 1 G: 1 TABLET ORAL at 20:57

## 2025-01-30 RX ADMIN — OXYCODONE HYDROCHLORIDE AND ACETAMINOPHEN 1 TABLET: 5; 325 TABLET ORAL at 20:41

## 2025-01-30 RX ADMIN — POTASSIUM CHLORIDE 10 MEQ: 750 TABLET, FILM COATED, EXTENDED RELEASE ORAL at 20:57

## 2025-01-30 RX ADMIN — SUCRALFATE 1 G: 1 TABLET ORAL at 16:41

## 2025-01-30 RX ADMIN — LEVOTHYROXINE SODIUM 50 MCG: 0.05 TABLET ORAL at 06:47

## 2025-01-30 RX ADMIN — SUCRALFATE 1 G: 1 TABLET ORAL at 08:08

## 2025-01-30 RX ADMIN — ROPINIROLE HYDROCHLORIDE 2 MG: 1 TABLET, FILM COATED ORAL at 08:08

## 2025-01-30 RX ADMIN — HYDROXYZINE HYDROCHLORIDE 25 MG: 25 TABLET ORAL at 06:47

## 2025-01-30 RX ADMIN — BETHANECHOL CHLORIDE 25 MG: 25 TABLET ORAL at 13:11

## 2025-01-30 RX ADMIN — BETHANECHOL CHLORIDE 25 MG: 25 TABLET ORAL at 20:57

## 2025-01-30 RX ADMIN — BETHANECHOL CHLORIDE 25 MG: 25 TABLET ORAL at 08:08

## 2025-01-30 RX ADMIN — PANTOPRAZOLE SODIUM 40 MG: 40 TABLET, DELAYED RELEASE ORAL at 16:41

## 2025-01-30 RX ADMIN — OXYCODONE HYDROCHLORIDE AND ACETAMINOPHEN 1 TABLET: 5; 325 TABLET ORAL at 08:07

## 2025-01-30 RX ADMIN — CARVEDILOL 3.12 MG: 3.12 TABLET, FILM COATED ORAL at 16:41

## 2025-01-30 RX ADMIN — SUCRALFATE 1 G: 1 TABLET ORAL at 13:11

## 2025-01-30 RX ADMIN — OXYCODONE HYDROCHLORIDE AND ACETAMINOPHEN 1 TABLET: 5; 325 TABLET ORAL at 16:41

## 2025-01-30 RX ADMIN — CARVEDILOL 3.12 MG: 3.12 TABLET, FILM COATED ORAL at 08:08

## 2025-01-30 RX ADMIN — POTASSIUM CHLORIDE 10 MEQ: 750 TABLET, FILM COATED, EXTENDED RELEASE ORAL at 08:08

## 2025-01-30 RX ADMIN — SODIUM CHLORIDE, PRESERVATIVE FREE 10 ML: 5 INJECTION INTRAVENOUS at 08:09

## 2025-01-30 RX ADMIN — PANTOPRAZOLE SODIUM 40 MG: 40 TABLET, DELAYED RELEASE ORAL at 06:47

## 2025-01-30 RX ADMIN — SODIUM CHLORIDE, PRESERVATIVE FREE 10 ML: 5 INJECTION INTRAVENOUS at 21:00

## 2025-01-30 RX ADMIN — ROPINIROLE HYDROCHLORIDE 2 MG: 1 TABLET, FILM COATED ORAL at 20:57

## 2025-01-30 RX ADMIN — OXYCODONE HYDROCHLORIDE AND ACETAMINOPHEN 1 TABLET: 5; 325 TABLET ORAL at 02:39

## 2025-01-30 ASSESSMENT — PAIN DESCRIPTION - DESCRIPTORS
DESCRIPTORS: SHARP;STABBING
DESCRIPTORS: SHARP
DESCRIPTORS: SHARP

## 2025-01-30 ASSESSMENT — PAIN SCALES - GENERAL
PAINLEVEL_OUTOF10: 9
PAINLEVEL_OUTOF10: 10
PAINLEVEL_OUTOF10: 6
PAINLEVEL_OUTOF10: 10

## 2025-01-30 ASSESSMENT — PAIN DESCRIPTION - PAIN TYPE
TYPE: CHRONIC PAIN
TYPE: CHRONIC PAIN

## 2025-01-30 ASSESSMENT — PAIN - FUNCTIONAL ASSESSMENT
PAIN_FUNCTIONAL_ASSESSMENT: ACTIVITIES ARE NOT PREVENTED
PAIN_FUNCTIONAL_ASSESSMENT: PREVENTS OR INTERFERES SOME ACTIVE ACTIVITIES AND ADLS
PAIN_FUNCTIONAL_ASSESSMENT: PREVENTS OR INTERFERES SOME ACTIVE ACTIVITIES AND ADLS

## 2025-01-30 ASSESSMENT — PAIN DESCRIPTION - LOCATION
LOCATION: BACK

## 2025-01-30 ASSESSMENT — PAIN DESCRIPTION - FREQUENCY
FREQUENCY: CONTINUOUS
FREQUENCY: CONTINUOUS

## 2025-01-30 ASSESSMENT — PAIN DESCRIPTION - ORIENTATION
ORIENTATION: MID;LOWER

## 2025-01-30 ASSESSMENT — PAIN SCALES - WONG BAKER: WONGBAKER_NUMERICALRESPONSE: NO HURT

## 2025-01-30 ASSESSMENT — PAIN DESCRIPTION - ONSET
ONSET: ON-GOING
ONSET: ON-GOING

## 2025-01-30 NOTE — OP NOTE
[address] [address]                            OPERATIVE REPORT      PATIENT NAME: RAMESH MEDINA               : 1945  MED REC NO: [id number]                       ROOM: [facility]  ACCOUNT NO: [id number]                       ADMIT DATE: 2025  PROVIDER: Sandor Johnson MD      DATE OF PROCEDURE:  2025    SURGEON:  Sandor Johnson MD    INDICATION:  GI bleed.    ANESTHESIA:  IV Diprivan per Anesthesia.    Prior to procedure, risks, benefits, complications (including but not limited to the following, bleeding, infection, perforation, emergency surgery, stroke, heart attack, death, possible anesthetic risks, and the fact that the procedure is not 100% accurate or successful), indications, and alternatives of colonoscopy were explained to the patient.  She understood and agreed to the procedure.  All questions were answered.    DESCRIPTION OF PROCEDURE:  The patient was placed in the left lateral decubitus position.  Digital rectal exam was done which was normal.  Decreased sphincter tone was noted.  Colonoscope introduced per rectum.  There was some retained stool.  Colonoscope advanced to rectum and sigmoid colon.  There was edema of the mucosa.  Very tortuous.  I tried for 5 minutes, but could not get through with the regular scope.  We thus removed this and used the pediatric scope.  I was able to gently advance through the sigmoid.  I saw no mass, but just diverticulosis and thickened mucosa.  Colonoscope advanced to descending colon and showed an area of possible ischemia.  Diverticula were noted.  Colonoscope was advanced to transverse colon, ascending colon, and cecum.  Cecum was confirmed by visualization of the ileocecal valve, visualization of the appendiceal orifice, and visualization of the entire terminal ileum.    Colonoscope carefully traced back to the colon.  Colon was decompressed along the way.  Colonic

## 2025-01-31 PROBLEM — M54.16 LUMBAR RADICULOPATHY: Status: ACTIVE | Noted: 2022-03-15

## 2025-01-31 PROBLEM — Z51.5 PALLIATIVE CARE PATIENT: Status: ACTIVE | Noted: 2025-01-31

## 2025-01-31 PROBLEM — M48.061 SPINAL STENOSIS OF LUMBAR REGION: Status: ACTIVE | Noted: 2019-11-11

## 2025-01-31 PROBLEM — R64 CACHEXIA (HCC): Status: ACTIVE | Noted: 2024-04-04

## 2025-01-31 PROCEDURE — 6370000000 HC RX 637 (ALT 250 FOR IP)

## 2025-01-31 PROCEDURE — 6370000000 HC RX 637 (ALT 250 FOR IP): Performed by: INTERNAL MEDICINE

## 2025-01-31 PROCEDURE — 99223 1ST HOSP IP/OBS HIGH 75: CPT

## 2025-01-31 PROCEDURE — 1200000003 HC TELEMETRY R&B

## 2025-01-31 PROCEDURE — 99232 SBSQ HOSP IP/OBS MODERATE 35: CPT | Performed by: INTERNAL MEDICINE

## 2025-01-31 PROCEDURE — 2500000003 HC RX 250 WO HCPCS: Performed by: INTERNAL MEDICINE

## 2025-01-31 PROCEDURE — 6370000000 HC RX 637 (ALT 250 FOR IP): Performed by: PHYSICIAN ASSISTANT

## 2025-01-31 PROCEDURE — 6360000002 HC RX W HCPCS: Performed by: INTERNAL MEDICINE

## 2025-01-31 RX ORDER — OXYCODONE HYDROCHLORIDE 5 MG/1
10 TABLET ORAL
Status: DISCONTINUED | OUTPATIENT
Start: 2025-01-31 | End: 2025-02-05 | Stop reason: HOSPADM

## 2025-01-31 RX ORDER — OXYCODONE HYDROCHLORIDE 5 MG/1
5 TABLET ORAL
Status: DISCONTINUED | OUTPATIENT
Start: 2025-01-31 | End: 2025-02-05 | Stop reason: HOSPADM

## 2025-01-31 RX ORDER — MEGESTROL ACETATE 40 MG/1
20 TABLET ORAL 2 TIMES DAILY
Status: DISCONTINUED | OUTPATIENT
Start: 2025-01-31 | End: 2025-02-05 | Stop reason: HOSPADM

## 2025-01-31 RX ADMIN — SUCRALFATE 1 G: 1 TABLET ORAL at 17:04

## 2025-01-31 RX ADMIN — FLUTICASONE PROPIONATE 2 SPRAY: 50 SPRAY, METERED NASAL at 10:42

## 2025-01-31 RX ADMIN — OXYCODONE HYDROCHLORIDE 10 MG: 5 TABLET ORAL at 15:56

## 2025-01-31 RX ADMIN — FLUTICASONE PROPIONATE 2 SPRAY: 50 SPRAY, METERED NASAL at 21:04

## 2025-01-31 RX ADMIN — LEVOTHYROXINE SODIUM 50 MCG: 0.05 TABLET ORAL at 06:15

## 2025-01-31 RX ADMIN — CARVEDILOL 3.12 MG: 3.12 TABLET, FILM COATED ORAL at 10:38

## 2025-01-31 RX ADMIN — OXYCODONE HYDROCHLORIDE AND ACETAMINOPHEN 1 TABLET: 5; 325 TABLET ORAL at 10:38

## 2025-01-31 RX ADMIN — SUCRALFATE 1 G: 1 TABLET ORAL at 21:02

## 2025-01-31 RX ADMIN — BETHANECHOL CHLORIDE 25 MG: 25 TABLET ORAL at 21:03

## 2025-01-31 RX ADMIN — PANTOPRAZOLE SODIUM 40 MG: 40 TABLET, DELAYED RELEASE ORAL at 06:15

## 2025-01-31 RX ADMIN — OXYCODONE HYDROCHLORIDE 10 MG: 5 TABLET ORAL at 19:23

## 2025-01-31 RX ADMIN — BETHANECHOL CHLORIDE 25 MG: 25 TABLET ORAL at 14:17

## 2025-01-31 RX ADMIN — SUCRALFATE 1 G: 1 TABLET ORAL at 10:43

## 2025-01-31 RX ADMIN — SUCRALFATE 1 G: 1 TABLET ORAL at 07:32

## 2025-01-31 RX ADMIN — POTASSIUM CHLORIDE 10 MEQ: 750 TABLET, FILM COATED, EXTENDED RELEASE ORAL at 21:03

## 2025-01-31 RX ADMIN — BETHANECHOL CHLORIDE 25 MG: 25 TABLET ORAL at 10:38

## 2025-01-31 RX ADMIN — SODIUM CHLORIDE, PRESERVATIVE FREE 10 ML: 5 INJECTION INTRAVENOUS at 21:04

## 2025-01-31 RX ADMIN — ROPINIROLE HYDROCHLORIDE 2 MG: 1 TABLET, FILM COATED ORAL at 10:38

## 2025-01-31 RX ADMIN — OXYCODONE HYDROCHLORIDE AND ACETAMINOPHEN 1 TABLET: 5; 325 TABLET ORAL at 04:40

## 2025-01-31 RX ADMIN — MEGESTROL ACETATE 20 MG: 40 TABLET ORAL at 21:04

## 2025-01-31 RX ADMIN — POTASSIUM CHLORIDE 10 MEQ: 750 TABLET, FILM COATED, EXTENDED RELEASE ORAL at 10:38

## 2025-01-31 RX ADMIN — ROPINIROLE HYDROCHLORIDE 2 MG: 1 TABLET, FILM COATED ORAL at 21:03

## 2025-01-31 RX ADMIN — PANTOPRAZOLE SODIUM 40 MG: 40 TABLET, DELAYED RELEASE ORAL at 18:32

## 2025-01-31 ASSESSMENT — PAIN DESCRIPTION - LOCATION
LOCATION: BACK

## 2025-01-31 ASSESSMENT — PAIN SCALES - GENERAL
PAINLEVEL_OUTOF10: 10
PAINLEVEL_OUTOF10: 8
PAINLEVEL_OUTOF10: 7

## 2025-01-31 ASSESSMENT — PAIN DESCRIPTION - ORIENTATION
ORIENTATION: MID
ORIENTATION: LOWER
ORIENTATION: LOWER
ORIENTATION: MID
ORIENTATION: LOWER;MID
ORIENTATION: LOWER

## 2025-01-31 ASSESSMENT — PAIN DESCRIPTION - DESCRIPTORS
DESCRIPTORS: ACHING;DISCOMFORT
DESCRIPTORS: ACHING;SHARP

## 2025-01-31 ASSESSMENT — PAIN - FUNCTIONAL ASSESSMENT: PAIN_FUNCTIONAL_ASSESSMENT: PREVENTS OR INTERFERES SOME ACTIVE ACTIVITIES AND ADLS

## 2025-01-31 NOTE — CONSULTS
Provider Consult Note        Patient:   Letty Edge  YOB: 1945  Age:  79 y.o.  Room:  [facility]05/005-A  MRN:  [id number]   Acct: [id number]  PCP: Sakshi Sosa MD    Date of Admission:  1/23/2025  9:32 AM  Date of Service:  1/31/2025    Reason for Consult: Symptom Management             Subjective   Chief Complaint:-   No chief complaint on file.       History Obtained From:-  Patient, Family member(s) - nephew, Electronic Medical Record, Patient's primary nurse, and Palliative Care nurse    History of Present Illness:-                   Letty Edge is a 79 y.o. female who  has a past medical history of Acid reflux, Anxiety, Arthritis, Cancer (HCC), Depression, History of blood transfusion, Hx of blood clots, Hyperlipidemia, Osteoporosis, Pneumonia, Spinal stenosis, and Thyroid disease. They present to the hospital with No chief complaint on file.   and are admitted for Upper GI bleed.  Patient presented to [facility] emergency department on January 23, 2025 with complaints of bloody stool.  GI was consulted and a colonoscopy was completed on January 29, 2025 with no findings of an acute bleed.  Her hemoglobin on admission was 6.1, she received 2 units of blood and her hemoglobin came up to 9.8 on 1/30/2025.  She also had complaints of severe back pain on admission.  CT lumbar spine showing extensive postoperative changes between L2 and S1, degenerative changes most marked at L1-2 L2 to at which level there is mild to moderate canal, moderate severe right and moderate left-sided foraminal stenosis.  Neurosurgery was consulted on January 30 and did not feel the patient needed any operative management as she has an upcoming appointment with [place] regarding this.  The patient has had a significant amount of weight loss since the spring 2024.  She was down to 78 pounds, today she weighed 80 pounds.  She has related her weight loss to her significant amount of pain 
    Consult History & Physical      Patient:  Letty Edge  YOB: 1945  MRN: [id number]     Acct: [id number]    Chief Complaint:  No chief complaint on file.      Date of Service: Pt seen/examined in consultation on 1/23/2025    History Of Present Illness:      79 y.o. female who we are asked to see/evaluate by Santo Alcantara MD for medical management of anemia, positive fecal occult. Hg initially 10.1, down to 6.1. She was transfused 2 u PRC at [facility] and transferred to [facility]> She has been started on Protonix Octreotide drips. She also has mildly elevated WBC 14.1 and pneumonia and is on Rocephin and Azithromycin.     Recent hospitalization in 10/2024 due to dilated pancreatic duct and biliary tree, weight loss and anemia, she had EGD 10/31/2024 with DR. Hernandez showing moderate to large HH, gastritis with biopsy negave for h pylori, possible remote PUD and healing with prepyloric deformity.     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     10/30/2024 CA 19-9 90    She reports diarrhea and loss of appetite. She states the stools were dark but she is on oral iron. No bright red blood per rectum, nausea, vomiting or hematochezia. No abdominal pain. She is very frail, ill elderly appearing, cachetic.     Past Medical History:    Past Medical History:   Diagnosis Date    Acid reflux     Anxiety     Arthritis     Cancer (HCC)     squamous cell    Depression     History of blood transfusion     Hx of blood clots 2003    Bilateral PE    Hyperlipidemia     Osteoporosis     Pneumonia 2003    with Sepsis    Spinal stenosis     Thyroid disease        Home Medications:  Prior to Admission medications    Medication Sig Start Date End Date Taking? Authorizing Provider   bethanechol (URECHOLINE) 25 MG tablet Take 1 tablet by mouth 3 times daily   Yes Provider, MD Jordan   cyclobenzaprine (FLEXERIL) 
release tablet 10 mEq  10 mEq Oral BID Kassy Frey APRN - CNP   10 mEq at 01/30/25 0808    sodium chloride flush 0.9 % injection 5-40 mL  5-40 mL IntraVENous 2 times per day Sandor Johnson MD   10 mL at 01/30/25 0809    sodium chloride flush 0.9 % injection 5-40 mL  5-40 mL IntraVENous PRN Sandor Johnson MD        0.9 % sodium chloride infusion  25 mL IntraVENous PRN Sandor Johnson MD        carvedilol (COREG) tablet 3.125 mg  3.125 mg Oral BID  Kassy Frey APRN - CNP   3.125 mg at 01/30/25 0808    hydrOXYzine HCl (ATARAX) tablet 25 mg  25 mg Oral TID PRN Sandor Johnson MD   25 mg at 01/30/25 0647    melatonin tablet 3 mg  3 mg Oral Nightly PRN Sandor Johnson MD   3 mg at 01/29/25 2206    [Held by provider] fluticasone (FLONASE) 50 MCG/ACT nasal spray 2 spray  2 spray Each Nostril BID Brittany Macias MD   2 spray at 01/28/25 0948    bethanechol (URECHOLINE) tablet 25 mg  25 mg Oral TID Kassy Frey APRN - CNP   25 mg at 01/30/25 0808    sucralfate (CARAFATE) tablet 1 g  1 g Oral 4x Daily AC & HS Kassy Frey APRN - CNP   1 g at 01/30/25 0808    oxyCODONE-acetaminophen (PERCOCET) 5-325 MG per tablet 1 tablet  1 tablet Oral Q4H PRN Sandor Johnson MD   1 tablet at 01/30/25 0807    [Held by provider] lactobacillus (CULTURELLE) capsule 1 capsule  1 capsule Oral Daily with breakfast Brittany Macias MD   1 capsule at 01/27/25 0814    [Held by provider] loperamide (IMODIUM) capsule 2 mg  2 mg Oral 4x Daily PRN Brittany Macias MD   2 mg at 01/26/25 0754    pantoprazole (PROTONIX) tablet 40 mg  40 mg Oral BID AC Kassy Frey APRN - CNP   40 mg at 01/30/25 0647    sodium chloride flush 0.9 % injection 5-40 mL  5-40 mL IntraVENous 2 times per day Sandor Johnson MD   10 mL at 01/28/25 0949    sodium chloride flush 0.9 % injection 5-40 mL  5-40 mL IntraVENous PRN Sandor Johnson MD        0.9 % sodium chloride infusion   IntraVENous PRN Sandor Johnson,

## 2025-01-31 NOTE — PALLIATIVE CARE
Initial Evaluation        Patient:   Letty Edge  YOB: 1945  Age:  79 y.o.  Room:  [facility]005-  MRN:  [id number]   Acct: [id number]    Date of Admission:  1/23/2025  9:32 AM  Date of Service:  1/31/2025  Completed By:  Esperanza Salcedo RN        Reason for Palliative Care Evaluation:-   Code Status     Current Concerns   pain     Palliative Performance Scale   50%  Mainly sit/lie; Extensive disease; Can't do any work; Considerable assist; intake normal or reduced; LOC full/confusion     Goals of Care Discussions and Plan         Advance Care Planning   Goals of Care/Advance Care Planning (ACP) Conversation    Date of Conversation: 01/31/25    Individuals present for the conversation: Patient and Son Fabio     ACP documents on file prior to discussion:  -Power of  for Healthcare    Healthcare Power of /Healthcare Surrogate Decision Makers:  Advance Care Planning   Healthcare Decision Maker:    Primary Decision Maker: FABIO EDGE - child - [phone]    Conversation Summary:   Spoke with primary RN. Patient with complaints of uncontrolled back pain. Patient is alert and oriented, but very forgetful at times.     Call placed to patient's son/YA Morales. Discussed current plan of care. Fabio shared that patient has had two failed back surgeries. The first being with Dr Carter, the second at [facility]. After second surgery, patient was referred to [facility] pain management specialists in [place]. Over about the past year (Fabio states since conrad went through [place] in 2024) patient has significantly declined. Patient weighed 130 pounds, was down to 78lb. Currently documented that patient weights 80 lb. Per Fabio the patient seems to be in too much pain to eat. Fabio shares that patient has been in and out of the hospital since October. Was originally from home alone with help from cousins. Fabio stated even with significant help at home, this was not a safe living environment

## 2025-01-31 NOTE — DISCHARGE INSTR - COC
Continuity of Care Form    Patient Name: Letty Edge   :  1945  MRN:  [id number]    Admit date:  2025  Discharge date:  2025    Code Status Order: Full Code   Advance Directives:   Advance Care Flowsheet Documentation        Date/Time Healthcare Directive Type of Healthcare Directive Copy in Chart Healthcare Agent Appointed Healthcare Agent's Name Healthcare Agent's Phone Number    [phone] No, patient does not have an advance directive for healthcare treatment  --  --  --  --  --                     Admitting Physician:  Inderjit Urena MD  PCP: Sakshi Sosa MD    Discharging Nurse: Anand   Discharging Hospital Unit/Room#: 5K-05/005-A  Discharging Unit Phone Number: [phone]    Emergency Contact:   Extended Emergency Contact Information  Primary Emergency Contact: FABIO EDGE  [place] Phone: [phone]  Relation: Child   needed? No    Past Surgical History:  Past Surgical History:   Procedure Laterality Date    COLONOSCOPY      COLONOSCOPY N/A 2025    COLONOSCOPY BIOPSY performed by Sandor Johnson MD at [facility] ENDOSCOPY    DILATATION, ESOPHAGUS      EYE SURGERY      cataracts    FRACTURE SURGERY Right     wrist    HYSTERECTOMY (CERVIX STATUS UNKNOWN)      partial    LUMBAR FUSION N/A 2019    LUMBAR LAMINECTOMY, PSF L2-L5 WITH CAPTIVA, with removal of bladder stimulator performed by Tone Carter MD at [facility] OR    THYMECTOMY      TONSILLECTOMY      UPPER GASTROINTESTINAL ENDOSCOPY N/A 2025    EGD performed by Cyrus Hernandez MD at [facility] ENDOSCOPY    UPPER GASTROINTESTINAL ENDOSCOPY Left 2025    ESOPHAGOGASTRODUODENOSCOPY BIOPSY performed by Cyrus Hernandez MD at [facility] ENDOSCOPY       Immunization History:   Immunization History   Administered Date(s) Administered    COVID-19, PFIZER GRAY top, DO NOT Dilute, (age 12 y+), IM, 30 mcg/0.3 mL 2022    COVID-19, PFIZER PURPLE top, DILUTE for use, (age 12 y+), 30mcg/0.3mL 2021,

## 2025-02-01 PROBLEM — G89.29 CHRONIC LOW BACK PAIN: Status: ACTIVE | Noted: 2025-02-01

## 2025-02-01 PROBLEM — E87.1 HYPONATREMIA: Status: ACTIVE | Noted: 2025-02-01

## 2025-02-01 PROBLEM — M54.50 CHRONIC LOW BACK PAIN: Status: ACTIVE | Noted: 2025-02-01

## 2025-02-01 LAB
ANION GAP SERPL CALC-SCNC: 10 MEQ/L (ref 8–16)
BASOPHILS ABSOLUTE: 0 THOU/MM3 (ref 0–0.1)
BASOPHILS NFR BLD AUTO: 0.5 %
BUN SERPL-MCNC: 14 MG/DL (ref 7–22)
CALCIUM SERPL-MCNC: 8.5 MG/DL (ref 8.5–10.5)
CHLORIDE SERPL-SCNC: 98 MEQ/L (ref 98–111)
CO2 SERPL-SCNC: 23 MEQ/L (ref 23–33)
CREAT SERPL-MCNC: 0.4 MG/DL (ref 0.4–1.2)
DEPRECATED RDW RBC AUTO: 49.6 FL (ref 35–45)
EOSINOPHIL NFR BLD AUTO: 1.9 %
EOSINOPHILS ABSOLUTE: 0.1 THOU/MM3 (ref 0–0.4)
ERYTHROCYTE [DISTWIDTH] IN BLOOD BY AUTOMATED COUNT: 16 % (ref 11.5–14.5)
GFR SERPL CREATININE-BSD FRML MDRD: > 90 ML/MIN/1.73M2
GLUCOSE SERPL-MCNC: 76 MG/DL (ref 70–108)
HCT VFR BLD AUTO: 29.7 % (ref 37–47)
HGB BLD-MCNC: 9.2 GM/DL (ref 12–16)
IMM GRANULOCYTES # BLD AUTO: 0.02 THOU/MM3 (ref 0–0.07)
IMM GRANULOCYTES NFR BLD AUTO: 0.5 %
LYMPHOCYTES ABSOLUTE: 1.5 THOU/MM3 (ref 1–4.8)
LYMPHOCYTES NFR BLD AUTO: 35.9 %
MCH RBC QN AUTO: 26.4 PG (ref 26–33)
MCHC RBC AUTO-ENTMCNC: 31 GM/DL (ref 32.2–35.5)
MCV RBC AUTO: 85.1 FL (ref 81–99)
MONOCYTES ABSOLUTE: 0.4 THOU/MM3 (ref 0.4–1.3)
MONOCYTES NFR BLD AUTO: 9.6 %
NEUTROPHILS ABSOLUTE: 2.2 THOU/MM3 (ref 1.8–7.7)
NEUTROPHILS NFR BLD AUTO: 51.6 %
NRBC BLD AUTO-RTO: 0 /100 WBC
OSMOLALITY SERPL: NORMAL MOSMOL/KG (ref 275–295)
OSMOLALITY UR: NORMAL MOSMOL/KG (ref 250–750)
PLATELET # BLD AUTO: 518 THOU/MM3 (ref 130–400)
PMV BLD AUTO: 9.6 FL (ref 9.4–12.4)
POTASSIUM SERPL-SCNC: 4.6 MEQ/L (ref 3.5–5.2)
RBC # BLD AUTO: 3.49 MILL/MM3 (ref 4.2–5.4)
SODIUM SERPL-SCNC: 131 MEQ/L (ref 135–145)
SODIUM UR-SCNC: 36 MEQ/L
WBC # BLD AUTO: 4.3 THOU/MM3 (ref 4.8–10.8)

## 2025-02-01 PROCEDURE — 83935 ASSAY OF URINE OSMOLALITY: CPT

## 2025-02-01 PROCEDURE — 2500000003 HC RX 250 WO HCPCS: Performed by: INTERNAL MEDICINE

## 2025-02-01 PROCEDURE — 99232 SBSQ HOSP IP/OBS MODERATE 35: CPT | Performed by: INTERNAL MEDICINE

## 2025-02-01 PROCEDURE — 83930 ASSAY OF BLOOD OSMOLALITY: CPT

## 2025-02-01 PROCEDURE — 85025 COMPLETE CBC W/AUTO DIFF WBC: CPT

## 2025-02-01 PROCEDURE — 6360000002 HC RX W HCPCS: Performed by: INTERNAL MEDICINE

## 2025-02-01 PROCEDURE — 80048 BASIC METABOLIC PNL TOTAL CA: CPT

## 2025-02-01 PROCEDURE — 6370000000 HC RX 637 (ALT 250 FOR IP)

## 2025-02-01 PROCEDURE — 6370000000 HC RX 637 (ALT 250 FOR IP): Performed by: INTERNAL MEDICINE

## 2025-02-01 PROCEDURE — 1200000003 HC TELEMETRY R&B

## 2025-02-01 PROCEDURE — 6360000002 HC RX W HCPCS

## 2025-02-01 PROCEDURE — 36415 COLL VENOUS BLD VENIPUNCTURE: CPT

## 2025-02-01 PROCEDURE — 6370000000 HC RX 637 (ALT 250 FOR IP): Performed by: PHYSICIAN ASSISTANT

## 2025-02-01 PROCEDURE — 84300 ASSAY OF URINE SODIUM: CPT

## 2025-02-01 RX ORDER — FENTANYL 75 UG/1
1 PATCH TRANSDERMAL
Status: DISCONTINUED | OUTPATIENT
Start: 2025-02-01 | End: 2025-02-05 | Stop reason: HOSPADM

## 2025-02-01 RX ORDER — LIDOCAINE 4 G/G
1 PATCH TOPICAL DAILY
Status: DISCONTINUED | OUTPATIENT
Start: 2025-02-01 | End: 2025-02-05 | Stop reason: HOSPADM

## 2025-02-01 RX ORDER — MORPHINE SULFATE 2 MG/ML
2 INJECTION, SOLUTION INTRAMUSCULAR; INTRAVENOUS EVERY 4 HOURS PRN
Status: DISCONTINUED | OUTPATIENT
Start: 2025-02-01 | End: 2025-02-03

## 2025-02-01 RX ADMIN — MEGESTROL ACETATE 20 MG: 40 TABLET ORAL at 10:32

## 2025-02-01 RX ADMIN — PANTOPRAZOLE SODIUM 40 MG: 40 TABLET, DELAYED RELEASE ORAL at 06:25

## 2025-02-01 RX ADMIN — FLUTICASONE PROPIONATE 2 SPRAY: 50 SPRAY, METERED NASAL at 10:28

## 2025-02-01 RX ADMIN — POTASSIUM CHLORIDE 10 MEQ: 750 TABLET, FILM COATED, EXTENDED RELEASE ORAL at 10:28

## 2025-02-01 RX ADMIN — OXYCODONE HYDROCHLORIDE 10 MG: 5 TABLET ORAL at 03:50

## 2025-02-01 RX ADMIN — SUCRALFATE 1 G: 1 TABLET ORAL at 17:04

## 2025-02-01 RX ADMIN — SODIUM CHLORIDE, PRESERVATIVE FREE 10 ML: 5 INJECTION INTRAVENOUS at 10:28

## 2025-02-01 RX ADMIN — SUCRALFATE 1 G: 1 TABLET ORAL at 20:48

## 2025-02-01 RX ADMIN — ROPINIROLE HYDROCHLORIDE 2 MG: 1 TABLET, FILM COATED ORAL at 20:48

## 2025-02-01 RX ADMIN — CARVEDILOL 3.12 MG: 3.12 TABLET, FILM COATED ORAL at 08:03

## 2025-02-01 RX ADMIN — FLUTICASONE PROPIONATE 2 SPRAY: 50 SPRAY, METERED NASAL at 20:50

## 2025-02-01 RX ADMIN — BETHANECHOL CHLORIDE 25 MG: 25 TABLET ORAL at 10:28

## 2025-02-01 RX ADMIN — MORPHINE SULFATE 2 MG: 2 INJECTION, SOLUTION INTRAMUSCULAR; INTRAVENOUS at 14:40

## 2025-02-01 RX ADMIN — ROPINIROLE HYDROCHLORIDE 2 MG: 1 TABLET, FILM COATED ORAL at 10:28

## 2025-02-01 RX ADMIN — POTASSIUM CHLORIDE 10 MEQ: 750 TABLET, FILM COATED, EXTENDED RELEASE ORAL at 20:47

## 2025-02-01 RX ADMIN — MEGESTROL ACETATE 20 MG: 40 TABLET ORAL at 20:47

## 2025-02-01 RX ADMIN — SUCRALFATE 1 G: 1 TABLET ORAL at 08:03

## 2025-02-01 RX ADMIN — OXYCODONE HYDROCHLORIDE 10 MG: 5 TABLET ORAL at 15:45

## 2025-02-01 RX ADMIN — MORPHINE SULFATE 0.5 MG: 2 INJECTION, SOLUTION INTRAMUSCULAR; INTRAVENOUS at 10:21

## 2025-02-01 RX ADMIN — BETHANECHOL CHLORIDE 25 MG: 25 TABLET ORAL at 20:48

## 2025-02-01 RX ADMIN — OXYCODONE HYDROCHLORIDE 10 MG: 5 TABLET ORAL at 08:02

## 2025-02-01 RX ADMIN — LEVOTHYROXINE SODIUM 50 MCG: 0.05 TABLET ORAL at 06:25

## 2025-02-01 RX ADMIN — OXYCODONE HYDROCHLORIDE 10 MG: 5 TABLET ORAL at 00:04

## 2025-02-01 RX ADMIN — BETHANECHOL CHLORIDE 25 MG: 25 TABLET ORAL at 14:41

## 2025-02-01 RX ADMIN — HYDROXYZINE HYDROCHLORIDE 25 MG: 25 TABLET ORAL at 10:28

## 2025-02-01 RX ADMIN — PANTOPRAZOLE SODIUM 40 MG: 40 TABLET, DELAYED RELEASE ORAL at 15:48

## 2025-02-01 RX ADMIN — SUCRALFATE 1 G: 1 TABLET ORAL at 10:35

## 2025-02-01 RX ADMIN — SODIUM CHLORIDE, PRESERVATIVE FREE 10 ML: 5 INJECTION INTRAVENOUS at 20:49

## 2025-02-01 RX ADMIN — OXYCODONE HYDROCHLORIDE 10 MG: 5 TABLET ORAL at 12:20

## 2025-02-01 RX ADMIN — MORPHINE SULFATE 0.5 MG: 2 INJECTION, SOLUTION INTRAMUSCULAR; INTRAVENOUS at 06:19

## 2025-02-01 RX ADMIN — ACETAMINOPHEN 650 MG: 325 TABLET ORAL at 09:09

## 2025-02-01 ASSESSMENT — PAIN DESCRIPTION - PAIN TYPE: TYPE: CHRONIC PAIN

## 2025-02-01 ASSESSMENT — PAIN SCALES - GENERAL
PAINLEVEL_OUTOF10: 8
PAINLEVEL_OUTOF10: 8
PAINLEVEL_OUTOF10: 9
PAINLEVEL_OUTOF10: 10
PAINLEVEL_OUTOF10: 9
PAINLEVEL_OUTOF10: 10
PAINLEVEL_OUTOF10: 6

## 2025-02-01 ASSESSMENT — PAIN DESCRIPTION - DESCRIPTORS
DESCRIPTORS: STABBING
DESCRIPTORS: STABBING;SHARP

## 2025-02-01 ASSESSMENT — PAIN DESCRIPTION - ORIENTATION
ORIENTATION: RIGHT;LEFT;LOWER;MID
ORIENTATION: LOWER

## 2025-02-01 ASSESSMENT — PAIN - FUNCTIONAL ASSESSMENT: PAIN_FUNCTIONAL_ASSESSMENT: PREVENTS OR INTERFERES SOME ACTIVE ACTIVITIES AND ADLS

## 2025-02-01 ASSESSMENT — PAIN DESCRIPTION - LOCATION
LOCATION: BACK
LOCATION: BACK

## 2025-02-01 ASSESSMENT — PAIN DESCRIPTION - ONSET: ONSET: ON-GOING

## 2025-02-01 ASSESSMENT — PAIN DESCRIPTION - FREQUENCY: FREQUENCY: CONTINUOUS

## 2025-02-01 NOTE — PALLIATIVE CARE
Received message from hospitalist regarding lidocaine patches and patient still experiencing pain. Patient currently has a scheduled fentanyl patch, oxycodone IR and morphine IV for pain relief. Will increase fentanyl patch to 75mcg /hr. Will also increase breakthrough IV morphine to 2mg every 4 hours as needed

## 2025-02-02 LAB
ANION GAP SERPL CALC-SCNC: 8 MEQ/L (ref 8–16)
BUN SERPL-MCNC: 10 MG/DL (ref 7–22)
CALCIUM SERPL-MCNC: 8.8 MG/DL (ref 8.5–10.5)
CHLORIDE SERPL-SCNC: 98 MEQ/L (ref 98–111)
CO2 SERPL-SCNC: 26 MEQ/L (ref 23–33)
CREAT SERPL-MCNC: 0.4 MG/DL (ref 0.4–1.2)
GFR SERPL CREATININE-BSD FRML MDRD: > 90 ML/MIN/1.73M2
GLUCOSE SERPL-MCNC: 86 MG/DL (ref 70–108)
HCT VFR BLD AUTO: 31.3 % (ref 37–47)
HGB BLD-MCNC: 9.4 GM/DL (ref 12–16)
ORIGINAL SAMPLE NUMBER: NORMAL
ORIGINAL SAMPLE NUMBER: NORMAL
POTASSIUM SERPL-SCNC: 4.5 MEQ/L (ref 3.5–5.2)
REFERENCE LOCATION: NORMAL
REFERENCE LOCATION: NORMAL
REFERENCE RANGE: NORMAL
REFERENCE RANGE: NORMAL
SODIUM SERPL-SCNC: 132 MEQ/L (ref 135–145)
TEST RESULTS WITH UNITS: NORMAL
TEST RESULTS WITH UNITS: NORMAL
TEST(S) BEING PERFORMED: NORMAL
TEST(S) BEING PERFORMED: NORMAL

## 2025-02-02 PROCEDURE — 2500000003 HC RX 250 WO HCPCS: Performed by: INTERNAL MEDICINE

## 2025-02-02 PROCEDURE — 6370000000 HC RX 637 (ALT 250 FOR IP): Performed by: PHYSICIAN ASSISTANT

## 2025-02-02 PROCEDURE — 6370000000 HC RX 637 (ALT 250 FOR IP)

## 2025-02-02 PROCEDURE — 36415 COLL VENOUS BLD VENIPUNCTURE: CPT

## 2025-02-02 PROCEDURE — 1200000003 HC TELEMETRY R&B

## 2025-02-02 PROCEDURE — 85014 HEMATOCRIT: CPT

## 2025-02-02 PROCEDURE — 6370000000 HC RX 637 (ALT 250 FOR IP): Performed by: INTERNAL MEDICINE

## 2025-02-02 PROCEDURE — 80048 BASIC METABOLIC PNL TOTAL CA: CPT

## 2025-02-02 PROCEDURE — 85018 HEMOGLOBIN: CPT

## 2025-02-02 PROCEDURE — 99232 SBSQ HOSP IP/OBS MODERATE 35: CPT | Performed by: INTERNAL MEDICINE

## 2025-02-02 RX ADMIN — SODIUM CHLORIDE, PRESERVATIVE FREE 10 ML: 5 INJECTION INTRAVENOUS at 09:49

## 2025-02-02 RX ADMIN — SODIUM CHLORIDE, PRESERVATIVE FREE 10 ML: 5 INJECTION INTRAVENOUS at 09:48

## 2025-02-02 RX ADMIN — CARVEDILOL 3.12 MG: 3.12 TABLET, FILM COATED ORAL at 16:56

## 2025-02-02 RX ADMIN — POTASSIUM CHLORIDE 10 MEQ: 750 TABLET, FILM COATED, EXTENDED RELEASE ORAL at 21:14

## 2025-02-02 RX ADMIN — CHLORDIAZEPOXIDE HYDROCHLORIDE 5 MG: 5 CAPSULE ORAL at 16:56

## 2025-02-02 RX ADMIN — OXYCODONE HYDROCHLORIDE 10 MG: 5 TABLET ORAL at 20:55

## 2025-02-02 RX ADMIN — BETHANECHOL CHLORIDE 25 MG: 25 TABLET ORAL at 21:13

## 2025-02-02 RX ADMIN — PANTOPRAZOLE SODIUM 40 MG: 40 TABLET, DELAYED RELEASE ORAL at 09:02

## 2025-02-02 RX ADMIN — SUCRALFATE 1 G: 1 TABLET ORAL at 21:13

## 2025-02-02 RX ADMIN — ROPINIROLE HYDROCHLORIDE 2 MG: 1 TABLET, FILM COATED ORAL at 09:02

## 2025-02-02 RX ADMIN — OXYCODONE HYDROCHLORIDE 5 MG: 5 TABLET ORAL at 15:45

## 2025-02-02 RX ADMIN — SUCRALFATE 1 G: 1 TABLET ORAL at 05:01

## 2025-02-02 RX ADMIN — SUCRALFATE 1 G: 1 TABLET ORAL at 12:15

## 2025-02-02 RX ADMIN — CARVEDILOL 3.12 MG: 3.12 TABLET, FILM COATED ORAL at 09:02

## 2025-02-02 RX ADMIN — MEGESTROL ACETATE 20 MG: 40 TABLET ORAL at 21:13

## 2025-02-02 RX ADMIN — OXYCODONE HYDROCHLORIDE 10 MG: 5 TABLET ORAL at 12:15

## 2025-02-02 RX ADMIN — FLUTICASONE PROPIONATE 2 SPRAY: 50 SPRAY, METERED NASAL at 09:02

## 2025-02-02 RX ADMIN — SODIUM CHLORIDE, PRESERVATIVE FREE 10 ML: 5 INJECTION INTRAVENOUS at 21:21

## 2025-02-02 RX ADMIN — PANTOPRAZOLE SODIUM 40 MG: 40 TABLET, DELAYED RELEASE ORAL at 15:45

## 2025-02-02 RX ADMIN — OXYCODONE HYDROCHLORIDE 10 MG: 5 TABLET ORAL at 05:10

## 2025-02-02 RX ADMIN — POTASSIUM CHLORIDE 10 MEQ: 750 TABLET, FILM COATED, EXTENDED RELEASE ORAL at 09:02

## 2025-02-02 RX ADMIN — SUCRALFATE 1 G: 1 TABLET ORAL at 16:56

## 2025-02-02 RX ADMIN — FLUTICASONE PROPIONATE 2 SPRAY: 50 SPRAY, METERED NASAL at 22:00

## 2025-02-02 RX ADMIN — BETHANECHOL CHLORIDE 25 MG: 25 TABLET ORAL at 13:57

## 2025-02-02 RX ADMIN — ROPINIROLE HYDROCHLORIDE 2 MG: 1 TABLET, FILM COATED ORAL at 21:13

## 2025-02-02 RX ADMIN — MEGESTROL ACETATE 20 MG: 40 TABLET ORAL at 09:02

## 2025-02-02 RX ADMIN — LEVOTHYROXINE SODIUM 50 MCG: 0.05 TABLET ORAL at 05:01

## 2025-02-02 RX ADMIN — BETHANECHOL CHLORIDE 25 MG: 25 TABLET ORAL at 09:02

## 2025-02-02 RX ADMIN — OXYCODONE HYDROCHLORIDE 10 MG: 5 TABLET ORAL at 09:02

## 2025-02-02 ASSESSMENT — PAIN - FUNCTIONAL ASSESSMENT
PAIN_FUNCTIONAL_ASSESSMENT: PREVENTS OR INTERFERES SOME ACTIVE ACTIVITIES AND ADLS
PAIN_FUNCTIONAL_ASSESSMENT: PREVENTS OR INTERFERES SOME ACTIVE ACTIVITIES AND ADLS
PAIN_FUNCTIONAL_ASSESSMENT: ACTIVITIES ARE NOT PREVENTED

## 2025-02-02 ASSESSMENT — PAIN DESCRIPTION - ORIENTATION
ORIENTATION: LOWER;MID
ORIENTATION: LOWER
ORIENTATION: LOWER

## 2025-02-02 ASSESSMENT — PAIN DESCRIPTION - LOCATION
LOCATION: BACK

## 2025-02-02 ASSESSMENT — PAIN SCALES - WONG BAKER
WONGBAKER_NUMERICALRESPONSE: HURTS A LITTLE BIT

## 2025-02-02 ASSESSMENT — PAIN SCALES - GENERAL
PAINLEVEL_OUTOF10: 10
PAINLEVEL_OUTOF10: 5
PAINLEVEL_OUTOF10: 8
PAINLEVEL_OUTOF10: 5
PAINLEVEL_OUTOF10: 8
PAINLEVEL_OUTOF10: 8
PAINLEVEL_OUTOF10: 6
PAINLEVEL_OUTOF10: 10

## 2025-02-02 ASSESSMENT — PAIN DESCRIPTION - DESCRIPTORS
DESCRIPTORS: SHARP
DESCRIPTORS: DISCOMFORT
DESCRIPTORS: ACHING
DESCRIPTORS: ACHING
DESCRIPTORS: DISCOMFORT

## 2025-02-03 LAB
ANION GAP SERPL CALC-SCNC: 15 MEQ/L (ref 8–16)
BUN SERPL-MCNC: 10 MG/DL (ref 7–22)
CALCIUM SERPL-MCNC: 9.8 MG/DL (ref 8.5–10.5)
CHLORIDE SERPL-SCNC: 99 MEQ/L (ref 98–111)
CO2 SERPL-SCNC: 25 MEQ/L (ref 23–33)
CREAT SERPL-MCNC: 0.5 MG/DL (ref 0.4–1.2)
GFR SERPL CREATININE-BSD FRML MDRD: > 90 ML/MIN/1.73M2
GLUCOSE SERPL-MCNC: 119 MG/DL (ref 70–108)
POTASSIUM SERPL-SCNC: 4.4 MEQ/L (ref 3.5–5.2)
SODIUM SERPL-SCNC: 139 MEQ/L (ref 135–145)

## 2025-02-03 PROCEDURE — 99233 SBSQ HOSP IP/OBS HIGH 50: CPT

## 2025-02-03 PROCEDURE — 1200000003 HC TELEMETRY R&B

## 2025-02-03 PROCEDURE — 99232 SBSQ HOSP IP/OBS MODERATE 35: CPT | Performed by: INTERNAL MEDICINE

## 2025-02-03 PROCEDURE — 97530 THERAPEUTIC ACTIVITIES: CPT

## 2025-02-03 PROCEDURE — 6370000000 HC RX 637 (ALT 250 FOR IP): Performed by: INTERNAL MEDICINE

## 2025-02-03 PROCEDURE — 6370000000 HC RX 637 (ALT 250 FOR IP)

## 2025-02-03 PROCEDURE — 36415 COLL VENOUS BLD VENIPUNCTURE: CPT

## 2025-02-03 PROCEDURE — 2500000003 HC RX 250 WO HCPCS: Performed by: INTERNAL MEDICINE

## 2025-02-03 PROCEDURE — 92526 ORAL FUNCTION THERAPY: CPT

## 2025-02-03 PROCEDURE — 6370000000 HC RX 637 (ALT 250 FOR IP): Performed by: PHYSICIAN ASSISTANT

## 2025-02-03 PROCEDURE — 80048 BASIC METABOLIC PNL TOTAL CA: CPT

## 2025-02-03 PROCEDURE — 92610 EVALUATE SWALLOWING FUNCTION: CPT

## 2025-02-03 RX ORDER — DULOXETIN HYDROCHLORIDE 60 MG/1
60 CAPSULE, DELAYED RELEASE ORAL
Status: DISCONTINUED | OUTPATIENT
Start: 2025-02-03 | End: 2025-02-05 | Stop reason: HOSPADM

## 2025-02-03 RX ORDER — CHLORDIAZEPOXIDE HYDROCHLORIDE 5 MG/1
5 CAPSULE, GELATIN COATED ORAL 2 TIMES DAILY
Status: DISCONTINUED | OUTPATIENT
Start: 2025-02-03 | End: 2025-02-05 | Stop reason: HOSPADM

## 2025-02-03 RX ORDER — DULOXETIN HYDROCHLORIDE 60 MG/1
60 CAPSULE, DELAYED RELEASE ORAL DAILY
Status: DISCONTINUED | OUTPATIENT
Start: 2025-02-03 | End: 2025-02-03

## 2025-02-03 RX ORDER — DULOXETIN HYDROCHLORIDE 30 MG/1
30 CAPSULE, DELAYED RELEASE ORAL EVERY MORNING
Status: DISCONTINUED | OUTPATIENT
Start: 2025-02-03 | End: 2025-02-05 | Stop reason: HOSPADM

## 2025-02-03 RX ORDER — LORAZEPAM 0.5 MG/1
0.25 TABLET ORAL
Status: DISCONTINUED | OUTPATIENT
Start: 2025-02-03 | End: 2025-02-03

## 2025-02-03 RX ADMIN — BETHANECHOL CHLORIDE 25 MG: 25 TABLET ORAL at 13:27

## 2025-02-03 RX ADMIN — DULOXETINE HYDROCHLORIDE 30 MG: 30 CAPSULE, DELAYED RELEASE ORAL at 13:26

## 2025-02-03 RX ADMIN — CHLORDIAZEPOXIDE HYDROCHLORIDE 5 MG: 5 CAPSULE ORAL at 16:30

## 2025-02-03 RX ADMIN — OXYCODONE HYDROCHLORIDE 10 MG: 5 TABLET ORAL at 16:30

## 2025-02-03 RX ADMIN — SUCRALFATE 1 G: 1 TABLET ORAL at 19:57

## 2025-02-03 RX ADMIN — ROPINIROLE HYDROCHLORIDE 2 MG: 1 TABLET, FILM COATED ORAL at 08:44

## 2025-02-03 RX ADMIN — OXYCODONE HYDROCHLORIDE 10 MG: 5 TABLET ORAL at 06:49

## 2025-02-03 RX ADMIN — MEGESTROL ACETATE 20 MG: 40 TABLET ORAL at 08:45

## 2025-02-03 RX ADMIN — SODIUM CHLORIDE, PRESERVATIVE FREE 10 ML: 5 INJECTION INTRAVENOUS at 19:57

## 2025-02-03 RX ADMIN — BETHANECHOL CHLORIDE 25 MG: 25 TABLET ORAL at 08:44

## 2025-02-03 RX ADMIN — PANTOPRAZOLE SODIUM 40 MG: 40 TABLET, DELAYED RELEASE ORAL at 06:48

## 2025-02-03 RX ADMIN — SODIUM CHLORIDE, PRESERVATIVE FREE 10 ML: 5 INJECTION INTRAVENOUS at 08:45

## 2025-02-03 RX ADMIN — OXYCODONE HYDROCHLORIDE 10 MG: 5 TABLET ORAL at 10:44

## 2025-02-03 RX ADMIN — CHLORDIAZEPOXIDE HYDROCHLORIDE 5 MG: 5 CAPSULE ORAL at 06:49

## 2025-02-03 RX ADMIN — LEVOTHYROXINE SODIUM 50 MCG: 0.05 TABLET ORAL at 06:49

## 2025-02-03 RX ADMIN — PANTOPRAZOLE SODIUM 40 MG: 40 TABLET, DELAYED RELEASE ORAL at 16:30

## 2025-02-03 RX ADMIN — SUCRALFATE 1 G: 1 TABLET ORAL at 16:30

## 2025-02-03 RX ADMIN — Medication 3 MG: at 01:32

## 2025-02-03 RX ADMIN — ROPINIROLE HYDROCHLORIDE 2 MG: 1 TABLET, FILM COATED ORAL at 19:57

## 2025-02-03 RX ADMIN — ACETAMINOPHEN 650 MG: 325 TABLET ORAL at 08:43

## 2025-02-03 RX ADMIN — CARVEDILOL 3.12 MG: 3.12 TABLET, FILM COATED ORAL at 08:44

## 2025-02-03 RX ADMIN — POTASSIUM CHLORIDE 10 MEQ: 750 TABLET, FILM COATED, EXTENDED RELEASE ORAL at 08:43

## 2025-02-03 RX ADMIN — FLUTICASONE PROPIONATE 2 SPRAY: 50 SPRAY, METERED NASAL at 08:45

## 2025-02-03 RX ADMIN — POTASSIUM CHLORIDE 10 MEQ: 750 TABLET, FILM COATED, EXTENDED RELEASE ORAL at 19:55

## 2025-02-03 RX ADMIN — HYDROXYZINE HYDROCHLORIDE 25 MG: 25 TABLET ORAL at 01:31

## 2025-02-03 RX ADMIN — FLUTICASONE PROPIONATE 2 SPRAY: 50 SPRAY, METERED NASAL at 19:58

## 2025-02-03 RX ADMIN — OXYCODONE HYDROCHLORIDE 10 MG: 5 TABLET ORAL at 19:55

## 2025-02-03 RX ADMIN — SUCRALFATE 1 G: 1 TABLET ORAL at 10:46

## 2025-02-03 RX ADMIN — MEGESTROL ACETATE 20 MG: 40 TABLET ORAL at 19:58

## 2025-02-03 RX ADMIN — BETHANECHOL CHLORIDE 25 MG: 25 TABLET ORAL at 19:57

## 2025-02-03 RX ADMIN — SUCRALFATE 1 G: 1 TABLET ORAL at 08:44

## 2025-02-03 RX ADMIN — DULOXETINE HYDROCHLORIDE 60 MG: 60 CAPSULE, DELAYED RELEASE ORAL at 19:57

## 2025-02-03 ASSESSMENT — PAIN SCALES - GENERAL
PAINLEVEL_OUTOF10: 8
PAINLEVEL_OUTOF10: 10
PAINLEVEL_OUTOF10: 8
PAINLEVEL_OUTOF10: 8
PAINLEVEL_OUTOF10: 9
PAINLEVEL_OUTOF10: 10
PAINLEVEL_OUTOF10: 10

## 2025-02-03 ASSESSMENT — PAIN DESCRIPTION - ORIENTATION
ORIENTATION: LOWER
ORIENTATION: POSTERIOR;LOWER
ORIENTATION: POSTERIOR;LOWER
ORIENTATION: MID;LOWER
ORIENTATION: POSTERIOR;LOWER

## 2025-02-03 ASSESSMENT — PAIN DESCRIPTION - LOCATION
LOCATION: BACK

## 2025-02-03 ASSESSMENT — PAIN DESCRIPTION - DESCRIPTORS
DESCRIPTORS: SHARP
DESCRIPTORS: ACHING
DESCRIPTORS: ACHING
DESCRIPTORS: BURNING;SHARP
DESCRIPTORS: SHARP

## 2025-02-04 ENCOUNTER — APPOINTMENT (OUTPATIENT)
Dept: GENERAL RADIOLOGY | Age: 80
DRG: 871 | End: 2025-02-04
Attending: STUDENT IN AN ORGANIZED HEALTH CARE EDUCATION/TRAINING PROGRAM
Payer: MEDICARE

## 2025-02-04 LAB
ANION GAP SERPL CALC-SCNC: 10 MEQ/L (ref 8–16)
BUN SERPL-MCNC: 10 MG/DL (ref 7–22)
CALCIUM SERPL-MCNC: 8.9 MG/DL (ref 8.5–10.5)
CHLORIDE SERPL-SCNC: 99 MEQ/L (ref 98–111)
CO2 SERPL-SCNC: 24 MEQ/L (ref 23–33)
CREAT SERPL-MCNC: 0.4 MG/DL (ref 0.4–1.2)
GFR SERPL CREATININE-BSD FRML MDRD: > 90 ML/MIN/1.73M2
GLUCOSE SERPL-MCNC: 82 MG/DL (ref 70–108)
POTASSIUM SERPL-SCNC: 4.3 MEQ/L (ref 3.5–5.2)
SODIUM SERPL-SCNC: 133 MEQ/L (ref 135–145)

## 2025-02-04 PROCEDURE — 92526 ORAL FUNCTION THERAPY: CPT

## 2025-02-04 PROCEDURE — 2500000003 HC RX 250 WO HCPCS: Performed by: INTERNAL MEDICINE

## 2025-02-04 PROCEDURE — 6370000000 HC RX 637 (ALT 250 FOR IP): Performed by: PHYSICIAN ASSISTANT

## 2025-02-04 PROCEDURE — 6370000000 HC RX 637 (ALT 250 FOR IP)

## 2025-02-04 PROCEDURE — 92611 MOTION FLUOROSCOPY/SWALLOW: CPT

## 2025-02-04 PROCEDURE — 6370000000 HC RX 637 (ALT 250 FOR IP): Performed by: INTERNAL MEDICINE

## 2025-02-04 PROCEDURE — 99232 SBSQ HOSP IP/OBS MODERATE 35: CPT | Performed by: INTERNAL MEDICINE

## 2025-02-04 PROCEDURE — 36415 COLL VENOUS BLD VENIPUNCTURE: CPT

## 2025-02-04 PROCEDURE — 80048 BASIC METABOLIC PNL TOTAL CA: CPT

## 2025-02-04 PROCEDURE — 97530 THERAPEUTIC ACTIVITIES: CPT

## 2025-02-04 PROCEDURE — 74230 X-RAY XM SWLNG FUNCJ C+: CPT

## 2025-02-04 PROCEDURE — 97116 GAIT TRAINING THERAPY: CPT

## 2025-02-04 PROCEDURE — 97535 SELF CARE MNGMENT TRAINING: CPT

## 2025-02-04 PROCEDURE — 1200000003 HC TELEMETRY R&B

## 2025-02-04 PROCEDURE — 97110 THERAPEUTIC EXERCISES: CPT

## 2025-02-04 RX ADMIN — PANTOPRAZOLE SODIUM 40 MG: 40 TABLET, DELAYED RELEASE ORAL at 06:29

## 2025-02-04 RX ADMIN — FLUTICASONE PROPIONATE 2 SPRAY: 50 SPRAY, METERED NASAL at 09:20

## 2025-02-04 RX ADMIN — OXYCODONE HYDROCHLORIDE 10 MG: 5 TABLET ORAL at 05:37

## 2025-02-04 RX ADMIN — DULOXETINE HYDROCHLORIDE 60 MG: 60 CAPSULE, DELAYED RELEASE ORAL at 21:27

## 2025-02-04 RX ADMIN — BARIUM SULFATE 10 ML: 400 PASTE ORAL at 10:50

## 2025-02-04 RX ADMIN — OXYCODONE HYDROCHLORIDE 10 MG: 5 TABLET ORAL at 20:25

## 2025-02-04 RX ADMIN — SUCRALFATE 1 G: 1 TABLET ORAL at 05:31

## 2025-02-04 RX ADMIN — POTASSIUM CHLORIDE 10 MEQ: 750 TABLET, FILM COATED, EXTENDED RELEASE ORAL at 21:27

## 2025-02-04 RX ADMIN — DULOXETINE HYDROCHLORIDE 30 MG: 30 CAPSULE, DELAYED RELEASE ORAL at 09:19

## 2025-02-04 RX ADMIN — BARIUM SULFATE 30 ML: 0.81 POWDER, FOR SUSPENSION ORAL at 10:50

## 2025-02-04 RX ADMIN — SODIUM CHLORIDE, PRESERVATIVE FREE 10 ML: 5 INJECTION INTRAVENOUS at 09:21

## 2025-02-04 RX ADMIN — OXYCODONE HYDROCHLORIDE 10 MG: 5 TABLET ORAL at 00:17

## 2025-02-04 RX ADMIN — BARIUM SULFATE 20 ML: 400 SUSPENSION ORAL at 10:50

## 2025-02-04 RX ADMIN — ROPINIROLE HYDROCHLORIDE 2 MG: 1 TABLET, FILM COATED ORAL at 21:26

## 2025-02-04 RX ADMIN — SUCRALFATE 1 G: 1 TABLET ORAL at 09:20

## 2025-02-04 RX ADMIN — BETHANECHOL CHLORIDE 25 MG: 25 TABLET ORAL at 09:19

## 2025-02-04 RX ADMIN — CHLORDIAZEPOXIDE HYDROCHLORIDE 5 MG: 5 CAPSULE ORAL at 21:26

## 2025-02-04 RX ADMIN — PANTOPRAZOLE SODIUM 40 MG: 40 TABLET, DELAYED RELEASE ORAL at 16:03

## 2025-02-04 RX ADMIN — SODIUM CHLORIDE, PRESERVATIVE FREE 10 ML: 5 INJECTION INTRAVENOUS at 00:22

## 2025-02-04 RX ADMIN — OXYCODONE HYDROCHLORIDE 10 MG: 5 TABLET ORAL at 13:19

## 2025-02-04 RX ADMIN — BETHANECHOL CHLORIDE 25 MG: 25 TABLET ORAL at 13:20

## 2025-02-04 RX ADMIN — MEGESTROL ACETATE 20 MG: 40 TABLET ORAL at 09:20

## 2025-02-04 RX ADMIN — CHLORDIAZEPOXIDE HYDROCHLORIDE 5 MG: 5 CAPSULE ORAL at 09:19

## 2025-02-04 RX ADMIN — FLUTICASONE PROPIONATE 2 SPRAY: 50 SPRAY, METERED NASAL at 21:14

## 2025-02-04 RX ADMIN — BETHANECHOL CHLORIDE 25 MG: 25 TABLET ORAL at 21:27

## 2025-02-04 RX ADMIN — SUCRALFATE 1 G: 1 TABLET ORAL at 21:13

## 2025-02-04 RX ADMIN — LEVOTHYROXINE SODIUM 50 MCG: 0.05 TABLET ORAL at 06:29

## 2025-02-04 RX ADMIN — ACETAMINOPHEN 650 MG: 325 TABLET ORAL at 18:23

## 2025-02-04 RX ADMIN — CARVEDILOL 3.12 MG: 3.12 TABLET, FILM COATED ORAL at 09:19

## 2025-02-04 RX ADMIN — SODIUM CHLORIDE, PRESERVATIVE FREE 10 ML: 5 INJECTION INTRAVENOUS at 21:11

## 2025-02-04 RX ADMIN — POTASSIUM CHLORIDE 10 MEQ: 750 TABLET, FILM COATED, EXTENDED RELEASE ORAL at 09:19

## 2025-02-04 RX ADMIN — SUCRALFATE 1 G: 1 TABLET ORAL at 16:03

## 2025-02-04 RX ADMIN — ROPINIROLE HYDROCHLORIDE 2 MG: 1 TABLET, FILM COATED ORAL at 09:20

## 2025-02-04 RX ADMIN — MEGESTROL ACETATE 20 MG: 40 TABLET ORAL at 21:26

## 2025-02-04 RX ADMIN — OXYCODONE HYDROCHLORIDE 10 MG: 5 TABLET ORAL at 16:58

## 2025-02-04 RX ADMIN — OXYCODONE HYDROCHLORIDE 10 MG: 5 TABLET ORAL at 09:19

## 2025-02-04 ASSESSMENT — PAIN SCALES - GENERAL
PAINLEVEL_OUTOF10: 7
PAINLEVEL_OUTOF10: 10
PAINLEVEL_OUTOF10: 10
PAINLEVEL_OUTOF10: 7
PAINLEVEL_OUTOF10: 8
PAINLEVEL_OUTOF10: 9
PAINLEVEL_OUTOF10: 10
PAINLEVEL_OUTOF10: 8
PAINLEVEL_OUTOF10: 7
PAINLEVEL_OUTOF10: 9

## 2025-02-04 ASSESSMENT — PAIN DESCRIPTION - FREQUENCY: FREQUENCY: CONTINUOUS

## 2025-02-04 ASSESSMENT — PAIN DESCRIPTION - DESCRIPTORS
DESCRIPTORS: ACHING;SHARP
DESCRIPTORS: ACHING;SHARP
DESCRIPTORS: STABBING
DESCRIPTORS: SHARP
DESCRIPTORS: ACHING
DESCRIPTORS: ACHING
DESCRIPTORS: ACHING;SHARP
DESCRIPTORS: ACHING;SHARP

## 2025-02-04 ASSESSMENT — PAIN - FUNCTIONAL ASSESSMENT

## 2025-02-04 ASSESSMENT — PAIN DESCRIPTION - LOCATION
LOCATION: BACK

## 2025-02-04 ASSESSMENT — PAIN DESCRIPTION - ORIENTATION
ORIENTATION: POSTERIOR;LOWER
ORIENTATION: POSTERIOR
ORIENTATION: POSTERIOR;LOWER
ORIENTATION: POSTERIOR;LOWER
ORIENTATION: LOWER;POSTERIOR
ORIENTATION: POSTERIOR;LOWER
ORIENTATION: POSTERIOR;LOWER
ORIENTATION: LOWER

## 2025-02-04 ASSESSMENT — PAIN DESCRIPTION - PAIN TYPE
TYPE: CHRONIC PAIN
TYPE: CHRONIC PAIN

## 2025-02-05 VITALS
OXYGEN SATURATION: 92 % | RESPIRATION RATE: 22 BRPM | BODY MASS INDEX: 14.3 KG/M2 | WEIGHT: 80.69 LBS | HEIGHT: 63 IN | TEMPERATURE: 98.6 F | HEART RATE: 80 BPM | DIASTOLIC BLOOD PRESSURE: 63 MMHG | SYSTOLIC BLOOD PRESSURE: 135 MMHG

## 2025-02-05 PROCEDURE — 2500000003 HC RX 250 WO HCPCS: Performed by: INTERNAL MEDICINE

## 2025-02-05 PROCEDURE — 6370000000 HC RX 637 (ALT 250 FOR IP): Performed by: PHYSICIAN ASSISTANT

## 2025-02-05 PROCEDURE — 6370000000 HC RX 637 (ALT 250 FOR IP)

## 2025-02-05 PROCEDURE — 99233 SBSQ HOSP IP/OBS HIGH 50: CPT

## 2025-02-05 PROCEDURE — 6370000000 HC RX 637 (ALT 250 FOR IP): Performed by: INTERNAL MEDICINE

## 2025-02-05 PROCEDURE — 92526 ORAL FUNCTION THERAPY: CPT

## 2025-02-05 PROCEDURE — 99239 HOSP IP/OBS DSCHRG MGMT >30: CPT | Performed by: INTERNAL MEDICINE

## 2025-02-05 RX ORDER — HYDROXYZINE HYDROCHLORIDE 25 MG/1
25 TABLET, FILM COATED ORAL 3 TIMES DAILY PRN
Qty: 30 TABLET | Refills: 0
Start: 2025-02-05 | End: 2025-02-15

## 2025-02-05 RX ORDER — SUCRALFATE 1 G/1
1 TABLET ORAL
Qty: 120 TABLET | Refills: 3
Start: 2025-02-05

## 2025-02-05 RX ORDER — DULOXETIN HYDROCHLORIDE 60 MG/1
60 CAPSULE, DELAYED RELEASE ORAL
Qty: 30 CAPSULE | Refills: 3
Start: 2025-02-05

## 2025-02-05 RX ORDER — DULOXETIN HYDROCHLORIDE 30 MG/1
30 CAPSULE, DELAYED RELEASE ORAL EVERY MORNING
Qty: 30 CAPSULE | Refills: 3
Start: 2025-02-05

## 2025-02-05 RX ORDER — OXYCODONE HYDROCHLORIDE 5 MG/1
5 TABLET ORAL EVERY 4 HOURS PRN
Qty: 18 TABLET | Refills: 0 | Status: SHIPPED | OUTPATIENT
Start: 2025-02-05 | End: 2025-02-08

## 2025-02-05 RX ORDER — LIDOCAINE 4 G/G
1 PATCH TOPICAL DAILY
Qty: 30 EACH | Refills: 0
Start: 2025-02-05 | End: 2025-03-07

## 2025-02-05 RX ORDER — CARVEDILOL 3.12 MG/1
3.12 TABLET ORAL 2 TIMES DAILY WITH MEALS
Qty: 60 TABLET | Refills: 0
Start: 2025-02-05

## 2025-02-05 RX ORDER — FENTANYL 75 UG/1
1 PATCH TRANSDERMAL
Qty: 1 PATCH | Refills: 0 | Status: SHIPPED | OUTPATIENT
Start: 2025-02-07 | End: 2025-02-10

## 2025-02-05 RX ORDER — FLUTICASONE PROPIONATE 50 MCG
2 SPRAY, SUSPENSION (ML) NASAL 2 TIMES DAILY
Qty: 16 G | Refills: 0
Start: 2025-02-05

## 2025-02-05 RX ADMIN — CHLORDIAZEPOXIDE HYDROCHLORIDE 5 MG: 5 CAPSULE ORAL at 09:51

## 2025-02-05 RX ADMIN — LEVOTHYROXINE SODIUM 50 MCG: 0.05 TABLET ORAL at 06:12

## 2025-02-05 RX ADMIN — FLUTICASONE PROPIONATE 2 SPRAY: 50 SPRAY, METERED NASAL at 10:39

## 2025-02-05 RX ADMIN — SUCRALFATE 1 G: 1 TABLET ORAL at 06:12

## 2025-02-05 RX ADMIN — PANTOPRAZOLE SODIUM 40 MG: 40 TABLET, DELAYED RELEASE ORAL at 06:13

## 2025-02-05 RX ADMIN — OXYCODONE HYDROCHLORIDE 5 MG: 5 TABLET ORAL at 03:59

## 2025-02-05 RX ADMIN — MEGESTROL ACETATE 20 MG: 40 TABLET ORAL at 10:39

## 2025-02-05 RX ADMIN — SODIUM CHLORIDE, PRESERVATIVE FREE 10 ML: 5 INJECTION INTRAVENOUS at 09:51

## 2025-02-05 RX ADMIN — POTASSIUM CHLORIDE 10 MEQ: 750 TABLET, FILM COATED, EXTENDED RELEASE ORAL at 09:51

## 2025-02-05 RX ADMIN — DULOXETINE HYDROCHLORIDE 30 MG: 30 CAPSULE, DELAYED RELEASE ORAL at 09:51

## 2025-02-05 RX ADMIN — ROPINIROLE HYDROCHLORIDE 2 MG: 1 TABLET, FILM COATED ORAL at 10:39

## 2025-02-05 RX ADMIN — OXYCODONE HYDROCHLORIDE 5 MG: 5 TABLET ORAL at 11:22

## 2025-02-05 RX ADMIN — SODIUM CHLORIDE, PRESERVATIVE FREE 10 ML: 5 INJECTION INTRAVENOUS at 09:52

## 2025-02-05 RX ADMIN — CARVEDILOL 3.12 MG: 3.12 TABLET, FILM COATED ORAL at 09:51

## 2025-02-05 RX ADMIN — ACETAMINOPHEN 650 MG: 325 TABLET ORAL at 11:49

## 2025-02-05 RX ADMIN — BETHANECHOL CHLORIDE 25 MG: 25 TABLET ORAL at 09:51

## 2025-02-05 RX ADMIN — SUCRALFATE 1 G: 1 TABLET ORAL at 09:51

## 2025-02-05 RX ADMIN — OXYCODONE HYDROCHLORIDE 5 MG: 5 TABLET ORAL at 15:49

## 2025-02-05 ASSESSMENT — PAIN DESCRIPTION - ORIENTATION
ORIENTATION: MID
ORIENTATION: RIGHT;LEFT

## 2025-02-05 ASSESSMENT — PAIN DESCRIPTION - PAIN TYPE: TYPE: CHRONIC PAIN

## 2025-02-05 ASSESSMENT — PAIN - FUNCTIONAL ASSESSMENT
PAIN_FUNCTIONAL_ASSESSMENT: ACTIVITIES ARE NOT PREVENTED
PAIN_FUNCTIONAL_ASSESSMENT: ACTIVITIES ARE NOT PREVENTED

## 2025-02-05 ASSESSMENT — PAIN SCALES - GENERAL
PAINLEVEL_OUTOF10: 6
PAINLEVEL_OUTOF10: 6
PAINLEVEL_OUTOF10: 5
PAINLEVEL_OUTOF10: 6
PAINLEVEL_OUTOF10: 0
PAINLEVEL_OUTOF10: 8

## 2025-02-05 ASSESSMENT — PAIN DESCRIPTION - LOCATION
LOCATION: BACK
LOCATION: BACK
LOCATION: GENERALIZED

## 2025-02-05 ASSESSMENT — PAIN DESCRIPTION - FREQUENCY: FREQUENCY: INTERMITTENT

## 2025-02-05 ASSESSMENT — PAIN DESCRIPTION - DESCRIPTORS
DESCRIPTORS: ACHING
DESCRIPTORS: ACHING

## 2025-02-05 ASSESSMENT — PAIN DESCRIPTION - ONSET: ONSET: ON-GOING

## 2025-02-05 NOTE — CARE COORDINATION
1/27/25, 2:48 PM EST    DISCHARGE ON GOING EVALUATION    [facility] day: 4  Location: [facility]32/032-A Reason for admit: Upper GI bleed [K92.2]     Procedures:   1/22 2 units PRBC at OSH   1/23 EGD     Imaging since last note: none     Barriers to Discharge: Hospitalist and GI following. Clear Liquids; NPO at midnight. Plan for colonoscopy tomorrow. Bowel prep today. IV rocephin q24h. Atarax prn. IV zofran prn. Percocet prn. Carafate. Hgb 9.9.     PCP: Sakshi Sosa MD  Readmission Risk Score: 13    Patient Goals/Plan/Treatment Preferences: Plans to return home alone. Current [facility]. SW following.     
1/28/25, 12:38 PM EST    DISCHARGE PLANNING EVALUATION    Updated Letty at [facility].  Made her aware patient is not being discharged today.    
1/28/25, 3:04 PM EST    DISCHARGE ON GOING EVALUATION    [facility] day: 5  Location: [facility]32/032-A Reason for admit: Upper GI bleed [K92.2]     Procedures:   1/22 2 units PRBC at OSH   1/23 EGD     Imaging since last note:   1/28 CT Lumbar: pending     Barriers to Discharge: Hospitalist and GI following. Was scheduled for colonoscopy today; stools still not clear after enema. Plan for additional bowel prep w/ plan for colonoscopy tomorrow. Increased c/o pain. CT lumbar pending. IV morphine prn. Percocet prn. Na 129 (135) WBC 11.2 (9.5)     PCP: Sakshi Sosa MD  Readmission Risk Score: 13.4    Patient Goals/Plan/Treatment Preferences: Plans to return home alone. Current [facility]. SW following.     
1/29/25, 2:24 PM EST    DISCHARGE ON GOING EVALUATION    [facility] day: 6  Location: [facility] ENDO POOL RM/NONE Reason for admit: Upper GI bleed [K92.2]     Procedures:   1/22 2 units PRBC at OSH   1/23 EGD    Imaging since last note:   1/28 CT Lumbar: Extensive postoperative changes between L2 and S1. 2. Degenerative changes most marked at L1-2 at which level there is mild to  moderate canal, moderate severe right and moderate left-sided foraminal stenosis. 3. There is no recurrent disc herniation, canal stenosis at L2-3, L3-4, L4-5 and  L5-S1. There is mild to moderate bilateral foraminal stenosis at L4-5. 4. There is degenerative change involving the sacroiliac joints bilaterally    Barriers to Discharge: Hospitalist and GI following. NPO. Addition bowel prep this AM as stools not clear. Colonoscopy this afternoon. Tylenol prn. Fentanyl patch. IV lopressor q6h. IV morphine prn. Percocet prn. IV protonix. Na 136 (133) WBC 5.0 (11.2)     PCP: Sakshi Sosa MD  Readmission Risk Score: 13.4    Patient Goals/Plan/Treatment Preferences: Plans to return home alone. Current [facility]. SW following.     
1/30/25, 2:12 PM EST    DISCHARGE PLANNING EVALUATION    Met with patient this morning and told her she would be discharged to home tomorrow.  She told SW that she did not need to go to an ECF.  Went back to her room this afternoon to ask if she would have a ride by 11:00 am.  She said she talked to her son and he wants her to go to [facility]. Asked what her back up choice would be.  She told SW to ask her son.  Spoke with Irasema at [facility] and they do not have a bed, and she is unsure when one will open up. Called her son Andrew and discuss other options.  Next choice is [facility].  Called and made a referral to Rosanna at the facility.    
1/30/25, 3:23 PM EST    DISCHARGE ON GOING EVALUATION    [facility] day: 7  Location: -32/032-A Reason for admit: Upper GI bleed [K92.2]     Procedures:   1/22 2 units PRBC at OSH   1/23 EGD  1/29 Colonoscopy w/ biopsies.     Imaging since last note:   1/29 XR Lumbosacral: Degenerative spinal instability at L1-L2. 2. Chronic L2 anterior wedging fracture. 3. Posterior lumbosacral iliac fusion and posterior lumbar decompression.    Barriers to Discharge: Hospitalist following. GI signed off. Neurosurgery consulted and signed off- no plans for intervention. PT/OT. Full liquid diet- advancing today. Bethanechol TID. Coreg. Librium prn. Atarax prn. IV morphine prn- last received q/19. Percocet prn. Fentanyl patch. Levothyroxine. TSH 16.030    PCP: Sakshi Sosa MD  Readmission Risk Score: 13.1    Patient Goals/Plan/Treatment Preferences: From home alone. Current [facility]. Now wanting SNF; no precert. See SW notes.     
1/31/25, 10:21 AM EST    DISCHARGE PLANNING EVALUATION    SW met with Letty this morning, updated on acceptance to [facility]. SW did ask if family/friend would be transporting, pt unsure. Pt did call son while SW at bedside and left a voicemail asking for him to call back.     SW did let pt know waiting to hear from doctor if ready for dc today.     10:47 AM EST  Message from og Morales asking for a call back.     SW did call og Morales, left a voicemail for a call back.     11:39 AM EST  Spoke with provider and nursing, not planning discharge today. SW did call call Sneha with [facility] [phone] and updated on this, let her know possible discharge over the weekend.     3:36 PM EST  Call to og Morales this afternoon, reports he did talk with palliative care, reports plans on changing treatment approach with pt to help pt with her pain. Son aware [facility] is accepting pt. SW discussed possibly discharging this weekend. Son reports he is aware [facility] did not have bed available. SW did discuss she could call [facility] and if pt still here Monday bed would open SW can make referral. SW did discuss transport to [facility], son does not feel comfortable transporting. SW did discuss estimated cost for ambulette and explained if medically needing ambulance would do this, son reports he understands.     Call to Irasema with [facility], reports she had spoke with son and had told him if a bed would open she would call him. She asked that SW call on Monday if pt still there as things can change.   
1/31/25, 7:17 AM EST    DISCHARGE BARRIERS        Patient transferred to [facility]. Report given to unit SW, Yumiko, regarding discharge plan for this patient.    Received a message from Sneha at [facility].  They have accepted Letty.  She is not a precert.      
2/3/25, 1:46 PM EST    DISCHARGE PLANNING EVALUATION    SW met with pt and son Andrew at bedside, discussed possible discharge tomorrow. SW did discuss in discussion with nursing at this time would need ambulance transport. Son aware [facility] does not have currently and open bed and Irasema with [facility] does have sons phone number if bed would open.     Call to Sneha with [facility], updated on anticipated discharge tomorrow.   
2/4/25, 2:07 PM EST    DISCHARGE PLANNING EVALUATION    Call from Letty with [facility], asking for an update, SW let her know pt plans to go to SNF.     Team meeting this aftenroon, possible discharge tomorrow.     Call to Rosanna with admission at [facility], updated possible discharge tomorrow.   
2/5/25, 10:28 AM EST    DISCHARGE PLANNING EVALUATION    SW did request an ambulance transport to [facility] today, however [facility] is short on available transports. Faxed forms to [facility], awaiting a confirmation of time.       11:00 AM- This SW did speak with [facility], they are not able to transport until 6:30pm today. Discussed with PETER Michel, going to see if patient can be moved to discharge floor until transport.     2/5/25, 11:21 AM EST    Patient goals/plan/ treatment preferences discussed by  and .  Patient goals/plan/ treatment preferences reviewed with patient/ family.  Patient/ family verbalize understanding of discharge plan and are in agreement with goal/plan/treatment preferences.  Understanding was demonstrated using the teach back method.  AVS provided by RN at time of discharge, which includes all necessary medical information pertaining to the patients current course of illness, treatment, post-discharge goals of care, and treatment preferences.     Services At/After Discharge: Skilled Nursing Facility (SNF), Transport, Aide services, In ambulance, Nursing service, OT, and PT- [facility]     Letty will be discharged to [facility], where she will be skilled under her Medicare benefit. Transport is set up for 6:30pm today with [facility] ambulance. Spoke with Rosanna at [facility], made her aware of discharge plans. Updated son Andrew by phone, made him aware of transport plans for today and moving to discharge unit. RN to fax AVS and last dose MAR, call report.          
DISCHARGE PLANNING EVALUATION  1/24/25, 2:08 PM EST    Reason for Referral: Current [facility]  Decision Maker: Independent with decision making.   Current Services: [facility].  Spoke with Letty at the agency.  She said patient is current with RN but PT and OT had just discharged.  Asking for new orders for all disciplines.    New Services Requested: No  Family/ Social/ Home environment: From home alone.  Son lives in [place].  She has a niece Jael and nephew Bill who help make sure all of her needs are met.  She said she does not drive, but is able to find rides when she needs them.   Payment Source:Medicare and [employer]  Transportation at Discharge: Family vs cab  Post-acute (PAC) provider list was provided to patient. Patient was informed of their freedom to choose PAC provider. Discussed and offered to show the patient the relevant PAC Providers quality and resource use measures on Medicare Compare web site via computer based on patient's goals of care and treatment preferences. Questions regarding selection process were answered.      Teach Back Method used with Letty regarding care plan and discharge planning.   Patient verbalized understanding of the plan of care and contribute to goal setting.       Patient preferences and discharge plan: Letty would like to resume [facility] for RN, PT and OT.  Will need new orders.      Electronically signed by MACY Naik on 1/24/2025 at 2:08 PM    1/24/25, 2:12 PM EST    Patient goals/plan/ treatment preferences discussed by  and .  Patient goals/plan/ treatment preferences reviewed with patient/ family.  Patient/ family verbalize understanding of discharge plan and are in agreement with goal/plan/treatment preferences.  Understanding was demonstrated using the teach back method.  AVS provided by RN at time of discharge, which includes all necessary medical information pertaining to the patients current course of illness, 
Plan? Yes   Freedom of Choice list was provided with basic dialogue that supports the patient's individualized plan of care/goals, treatment preferences, and shares the quality data associated with the providers?  No  (Current w/ HH; no new agency)

## 2025-02-05 NOTE — DISCHARGE SUMMARY
Information about where to get these medications is not yet available    Ask your nurse or doctor about these medications  carvedilol 3.125 MG tablet  DULoxetine 30 MG extended release capsule  DULoxetine 60 MG extended release capsule  fluticasone 50 MCG/ACT nasal spray  hydrOXYzine HCl 25 MG tablet  lidocaine 4 % external patch  melatonin 3 MG Tabs tablet  sucralfate 1 GM tablet              Medication List        START taking these medications      carvedilol 3.125 MG tablet  Commonly known as: COREG  Take 1 tablet by mouth 2 times daily (with meals)     * DULoxetine 30 MG extended release capsule  Commonly known as: CYMBALTA  Take 1 capsule by mouth every morning     * DULoxetine 60 MG extended release capsule  Commonly known as: CYMBALTA  Take 1 capsule by mouth nightly     fentaNYL 75 MCG/HR  Commonly known as: DURAGESIC  Place 1 patch onto the skin every 72 hours for 3 days. Max Daily Amount: 1 patch  Start taking on: February 7, 2025  Replaces: fentaNYL 50 MCG/HR     fluticasone 50 MCG/ACT nasal spray  Commonly known as: FLONASE  2 sprays by Each Nostril route 2 times daily     hydrOXYzine HCl 25 MG tablet  Commonly known as: ATARAX  Take 1 tablet by mouth 3 times daily as needed for Anxiety     lidocaine 4 % external patch  Place 1 patch onto the skin daily     melatonin 3 MG Tabs tablet  Take 1 tablet by mouth nightly as needed (Insomnia)     oxyCODONE 5 MG immediate release tablet  Commonly known as: ROXICODONE  Take 1 tablet by mouth every 4 hours as needed for Pain for up to 3 days. Max Daily Amount: 30 mg     sucralfate 1 GM tablet  Commonly known as: CARAFATE  Take 1 tablet by mouth 3 times daily (before meals)           * This list has 2 medication(s) that are the same as other medications prescribed for you. Read the directions carefully, and ask your doctor or other care provider to review them with you.                CONTINUE taking these medications      bethanechol 25 MG tablet  Commonly

## 2025-02-05 NOTE — PROGRESS NOTES
ENDOSCOPY POSTPROCEDURE REPORT     PT NAME: Letty Edge  MEDICAL RECORD NUMBER: [id number]  YOB: 1945    PROCEDURE PERFORMED BY : Cyrus Hernandez MD    PROCEDURE TYPE:   EGD ___x___   COLONOSCOPY _______   ERCP ________  MEDICATIONS USED :  VERSED _____   FENTANYL_____   PROPOFOL __x____  Patient tolerated procedure well.  No apparent complications.   Estimated blood loss:  Nil  Specimens removed:  Yes_____  No______  Disposition of Specimens:  To Lab      BRIEF  FINDINGS:  Large HH.  Sticky barium on gastric mucosa.   2.  Focal ulcerations at EG jxn c/w GERD injury  3.prepyloric deformity and scar c/w remote ulcer and healing. Some erythema present. Antral bxs to r/o HP  4. O/w neg    IMP:  No blood or active bleeding.      PRELIMINARY PLAN:  1.Stop Octreotide  2.Continue PPI infusion today and tomorrow change to 40 IV q12  3.Diet:  Full liquids  4. Follow clinically and lab-wise      For complete findings and plan, see dictated report.     Cyrus Hernandez MD, MD  1/23/2025  2:07 PM          
                             Physician Progress Note        Patient:   Letty Edge  YOB: 1945  Age:  79 y.o.  Room:  [facility]05/005-A  MRN:  [id number]   Acct: [id number]  PCP: Sakshi Sosa MD    Date of Admission:  1/23/2025  9:32 AM  Date of Service:  2/5/2025    Reason for Consult: Symptom Management.    History Obtained From:-  Patient, Electronic Medical Record, and Patient's primary nurse             Subjective   Letty Edge was seen in their room today for follow up with the palliative care team. There was not family present at the bedside. Patient denies pain, shortness of breath, insomnia, fatigue, drowsiness, decreased appetite, nausea, vomiting, constipation, diarrhea, depression, and anxiety. They have Fentanyl patch 75 mcg/hr, Duloxetine 90, and lidocaine 4% patch daily, megace 20mg twice a day, librium 5mg BID for scheduled symptom relief. From 8 AM yesterday to 8 AM today, they have used tylenol 650mg once, oxycodone 10mg four times and 5mg once, for as needed symptom relief.  Their comfort medications are working well to control their symptoms.  They did get a good night sleep last night. The patient is eating or receiving tube feeds. They have had a bowel movement in the last 24 hours.     Objective   Vitals:-    /66   Pulse 83   Temp 98.6 °F (37 °C) (Oral)   Resp 18   Ht 1.6 m (5' 3\")   Wt 36.6 kg (80 lb 11 oz)   SpO2 92%   BMI 14.29 kg/m²     Physical Exam  Constitutional:       General: She is not in acute distress.     Appearance: She is cachectic. She is ill-appearing.   HENT:      Head: Normocephalic and atraumatic.      Right Ear: External ear normal.      Left Ear: External ear normal.      Nose: No rhinorrhea.   Eyes:      General:         Right eye: No discharge.         Left eye: No discharge.   Cardiovascular:      Rate and Rhythm: Normal rate.   Pulmonary:      Effort: No respiratory distress.   Musculoskeletal:      Cervical back: Neck supple. 
    BRIEF H&P//fENDOSCOPY PREPROCEDURE REPORT     Patient: Letty Edge  : 1945  Acct#: [id number]    Date: 2025  Indications/Brief History: severe anemia. Heme pos.   Anticipated Procedure: EGD    ASA class:  3  Airway Adequate?    Yes__x___   No_______    Preprocedure Exam:   Neuro: Alert, oriented.   Heart:  Regular rate and rhythm   Lungs: Clear to ausculation bilaterally, no evidence respiratory distress  Abdomen:  soft.  NT    PLAN:  EGD__x___   COLONOSCOPY ______     ERCP________    Cyrus Hernandez MD MD  2025, 1:53 PM    
    Hospitalist Progress Note      Patient:  Letty Edge 79 y.o. female     : 1945  Unit/Bed:-32/032-A  Date of Admission: 2025        ASSESSMENT AND PLAN    79-year-old female past medical history osteoporosis, anxiety, acid reflux, arthritis, depression, spinal stenosis, thyroid disease, pneumonia hyperlipidemia, history of blood clots and cancer presented with 4 days of abdominal pain with associated diarrhea. Found to be septic 2/2 PNA . As well as suspected UGIB . Admitted for further eval/management       #Non-Variceal Upper GIB:   -h/o GERD at home medications include pantoprazole 40 mg BID and Carafate 1 g Q6 hr   -no h/o alcohol use   -positive fecal occult ,Hg initially 10.1, down to 6.1. She was transfused 2 u PRC at [facility]   Plan:  -GI Consulted for UGIB, underwent EGD > Focal areas of ulceration with whitish exudate located at the level of the esophagogastric junction. No vessel or bleeding   -dc Protonix drip and switch to po bid   -dc octreotide drip st  -serial H&H  stable   -Ensure 2 large-bore peripheral IVs at all time, transfuse if Hgb <7.0 mg/dL and s/s of acute anemia      # sepsis 2/2  pneumonia   -nonproductive cough x2 weeks with no fevers or chills  -rhonchi B/L lung fields   -CT chest outside facility showed B/L pneumonia patient given rocephin and azithromycin prior to transfer, cont. For now ,  plan for 5-7 day course pending response  Currently on room air  Encourage ambulation and use of I-S    #Diarrhea  Reported per patient  Supportive treatment and follow-up GI pathogen panel  Unlikely C. difficile so plan to cancel the test     #hypokalemia  -K 3.1 replete per protocol     #chronic back pain  -h/o spinal stenosis  -at home medications fentanyl patch 50 mcg q72hr and oxycodone   -continue fentanyl patch  -0.5 mg morphine q4 PRN for breakthrough pain      #hypothyroidism  -continue at home levothyroxine 50 mg      #restless leg syndrome  -continue at home 
    Hospitalist Progress Note      Patient:  Letty Edge 79 y.o. female     : 1945  Unit/Bed:-32/032-A  Date of Admission: 2025        ASSESSMENT AND PLAN    79-year-old female past medical history osteoporosis, anxiety, acid reflux, arthritis, depression, spinal stenosis, thyroid disease, pneumonia hyperlipidemia, history of blood clots and cancer presented with 4 days of abdominal pain with associated diarrhea. Found to be septic 2/2 PNA . As well as suspected UGIB . Admitted for further eval/management       #suspected Non-Variceal Upper GIB   -h/o GERD at home medications include pantoprazole 40 mg BID and Carafate 1 g Q6 hr   -no h/o alcohol use   -positive fecal occult ,Hg initially 10.1, down to 6.1. She was transfused 2 u PRC at [facility]   Plan:  -GI Consulted for UGIB, underwent EGD > Focal areas of ulceration with whitish exudate located at the level of the esophagogastric junction. No active bleeding   -dc Protonix drip and switch to po bid   -Resume home sucralfate  -dc octreotide drip   -Daily CBC  -Ensure 2 large-bore peripheral IVs at all time, transfuse if Hgb <7.0 mg/dL and s/s of acute anemia   -advanced diet as tolerated      # sepsis 2/2  pneumonia , improving   -nonproductive cough x2 weeks with no fevers or chills  -rhonchi B/L lung fields   -CT chest outside facility showed B/L pneumonia patient given rocephin and azithromycin prior to transfer, cont. For now ,  plan for 5 day course  Currently on room air  Encourage ambulation and use of I-S    #Diarrhea, chronic  Reported per patient  Supportive treatment and follow-up GI pathogen panel       #hypokalemia  -K 3.1 replete per protocol     #chronic back pain  -h/o spinal stenosis  -at home medications fentanyl patch 50 mcg q72hr and Percocet  -continue fentanyl patch, Percocet  -0.5 mg morphine q4 PRN for breakthrough pain      #hypothyroidism  -continue at home levothyroxine 50 mg      #restless leg syndrome  -continue at 
    Hospitalist Progress Note      Patient:  Letty Edge 79 y.o. female     : 1945  Unit/Bed:-32/032-A  Date of Admission: 2025        ASSESSMENT AND PLAN    79-year-old female past medical history osteoporosis, anxiety, acid reflux, arthritis, depression, spinal stenosis, thyroid disease, pneumonia hyperlipidemia, history of blood clots and cancer presented with 4 days of abdominal pain with associated diarrhea. Found to be septic 2/2 PNA . As well as suspected UGIB . Admitted for further eval/management       #suspected Non-Variceal Upper GIB , stable  -h/o GERD at home medications include pantoprazole 40 mg BID and Carafate 1 g Q6 hr   -no h/o alcohol use   -positive fecal occult ,Hg initially 10.1, down to 6.1. She was transfused 2 u PRC at [facility]   Plan:  -GI Consulted for UGIB, underwent EGD > Focal areas of ulceration with whitish exudate located at the level of the esophagogastric junction. No active bleeding   -dc Protonix drip and switch to po bid   -Resume home sucralfate  -dc edoctreotide drip   -Daily CBC  -Ensure 2 large-bore peripheral IVs at all time, transfuse if Hgb <7.0 mg/dL and s/s of acute anemia   -advanced diet as tolerated      # sepsis 2/2  pneumonia , improving   -nonproductive cough x2 weeks with no fevers or chills  -rhonchi B/L lung fields   -CT chest outside facility showed B/L pneumonia patient given rocephin and azithromycin prior to transfer, cont. For now ,  plan for 5 day course last day on   Currently on room air  Encourage ambulation and use of I-S    #Diarrhea, chronic  Reported per patient  Supportive treatment and follow-up GI pathogen panel       #hypokalemia  -K 3.1 replete per protocol     #chronic back pain  -h/o spinal stenosis  -at home medications fentanyl patch 50 mcg q72hr and Percocet  -continue fentanyl patch, Percocet  -0.5 mg morphine q4 PRN for breakthrough pain      #hypothyroidism  -continue at home levothyroxine 50 mg      #restless 
    Hospitalist Progress Note      Patient:  Letty Edge 79 y.o. female     Unit/Bed:[address] [place] [address]-A    Date of Admission: 1/23/2025      ASSESSMENT AND PLAN    Active Problems  Anemia, suspected PUD   Colonoscopy today - no findings of acute bleeding noted.   GI signed off today.   Hgb stable today ~10. On admission, 6.1 s/p 2 units of PRBC.   Stool studies ordered. Hold anticoagulation. Regular diet.   Severe Back Pain, chronic   Pt follows with Pain Clinic.   Pt is on Fentanyl patch, Percocet.   CT lumbar shows \"Extensive postoperative changes between L2-S1. No recurrent disc herniation.\"   Son asked for Spine consult  Spine recommended FU OP with Dr. Carter   Physical Deconditioning   PT/OT/SW       CODE STATUS was discussed and had palliative care consult, and  son agreed to keep her comfortable without to any aggressive measures no CPR shock etc. CODE STATUS has been changed to DNR CC    Severe protein calorie malnutrition seen by dietitian    Resolved Problems  Hyponatremia   Sepsis 2/2 PNA   Hypokalemia   Chronic Conditions (reviewed and stable unless otherwise stated)  Hypothyroidism   TSH 16. T4 1.05   RLS   Osteoporosis   Anxiety       LDA: []CVC / []PICC / []Midline / []Graves / []Drains / []Mediport / [x]None  Antibiotics: None   Steroids: None   Labs (still needed?): [x]Yes / []No  IVF (still needed?): []Yes / [x]No    Level of care: [x]Step Down / []Med-Surg  Bed Status: [x]Inpatient / []Observation  Telemetry: [x]Yes / []No  PT/OT: [x]Yes / []No    DVT Prophylaxis: [] Lovenox / [] Heparin / [x] SCDs / [] Already on Systemic Anticoagulation / [] None   Code status: DNR-CC     Expected discharge date:  1-2 days   Disposition: Home      ===================================================================    Chief Complaint: Abdominal Pain     Subjective (past 24 hours): No acute events overnight. Pt resting in bed. Pt states that she had some abd pain last night. Pt wanted to go home alone but after some 
    Hospitalist Progress Note      Patient:  Letty Edge 79 y.o. female     Unit/Bed:3B-32/032-A    Date of Admission: 1/23/2025      ASSESSMENT AND PLAN    Active Problems  Anemia, suspected PUD   Case discussed with GI.   Hgb stable today ~9. On admission, 6.1 s/p 2 units of PRBC.   Colonoscopy planned for today, however prep unsuccessful. Colonoscopy tomorrow AM.   Stool studies ordered. Hold anticoagulation.   Severe Back Pain, chronic   Pt follows with Pain Clinic.   Pt is on Fentanyl patch, Percocet.   CT lumbar shows \"Extensive postoperative changes between L2-S1. No recurrent disc herniation.\"   At this time, we will not be upping her pain regimen.    Hyponatremia   Sodium 129 this AM. Recheck sodium this evening.   Resolved Problems  Sepsis 2/2 PNA   Hypokalemia   Chronic Conditions (reviewed and stable unless otherwise stated)  Hypothyroidism   RLS   Osteoporosis   Anxiety       LDA: []CVC / []PICC / []Midline / []Graves / []Drains / []Mediport / [x]None  Antibiotics: None   Steroids: None   Labs (still needed?): [x]Yes / []No  IVF (still needed?): []Yes / [x]No    Level of care: [x]Step Down / []Med-Surg  Bed Status: [x]Inpatient / []Observation  Telemetry: [x]Yes / []No  PT/OT: [x]Yes / []No    DVT Prophylaxis: [] Lovenox / [] Heparin / [x] SCDs / [] Already on Systemic Anticoagulation / [] None   Code status: Full Code     Expected discharge date:  1-2 days   Disposition: Home      ===================================================================    Chief Complaint: Abdominal Pain   Subjective (past 24 hours): No acute events overnight. Pt states that her back pain is uncontrolled. Pt is having trouble with bowel prep. Pt denies CP, SOB, HA.        Medications:    Infusion Medications    sodium chloride      sodium chloride 10 mL/hr at 01/23/25 1217    Scheduled Medications    sodium chloride flush  5-40 mL IntraVENous 2 times per day    carvedilol  3.125 mg Oral BID WC    fluticasone  2 spray Each 
    Hospitalist Progress Note      Patient:  Letty Edge 79 y.o. female     Unit/Bed:[facility]/032-    Date of Admission: 1/23/2025      ASSESSMENT AND PLAN    Active Problems  Anemia, suspected PUD   Colonoscopy today - no findings of acute bleeding noted.   GI signed off today.   Hgb stable today ~10. On admission, 6.1 s/p 2 units of PRBC.   Stool studies ordered. Hold anticoagulation. Regular diet.   Severe Back Pain, chronic   Pt follows with Pain Clinic.   Pt is on Fentanyl patch, Percocet.   CT lumbar shows \"Extensive postoperative changes between L2-S1. No recurrent disc herniation.\"   Son asked for Spine consult  Spine recommended FU OP with Dr. Carter   Physical Deconditioning   PT/OT/SW     Resolved Problems  Hyponatremia   Sepsis 2/2 PNA   Hypokalemia   Chronic Conditions (reviewed and stable unless otherwise stated)  Hypothyroidism   TSH 16. T4 1.05   RLS   Osteoporosis   Anxiety       LDA: []CVC / []PICC / []Midline / []Graves / []Drains / []Mediport / [x]None  Antibiotics: None   Steroids: None   Labs (still needed?): [x]Yes / []No  IVF (still needed?): []Yes / [x]No    Level of care: [x]Step Down / []Med-Surg  Bed Status: [x]Inpatient / []Observation  Telemetry: [x]Yes / []No  PT/OT: [x]Yes / []No    DVT Prophylaxis: [] Lovenox / [] Heparin / [x] SCDs / [] Already on Systemic Anticoagulation / [] None   Code status: Full Code     Expected discharge date:  1-2 days   Disposition: Home      ===================================================================    Chief Complaint: Abdominal Pain   Subjective (past 24 hours): No acute events overnight. Pt resting in bed. Pt states that she had some abd pain last night. Pt wanted to go home alone but after some discussion, decision was made to go to NH on DC.       Medications:    Infusion Medications    sodium chloride      sodium chloride 10 mL/hr at 01/29/25 1334    Scheduled Medications    potassium chloride  10 mEq Oral BID    sodium chloride flush  5-40 
    Hospitalist Progress Note      Patient:  Letty Edge 79 y.o. female     Unit/Bed:[facility]/032-A    Date of Admission: 1/23/2025      ASSESSMENT AND PLAN    Active Problems  Anemia, suspected PUD   Colonoscopy today - no findings of acute bleeding noted.   Hgb stable today ~10. On admission, 6.1 s/p 2 units of PRBC.   Stool studies ordered. Hold anticoagulation. Liquid diet.   Severe Back Pain, chronic   Pt follows with Pain Clinic.   Pt is on Fentanyl patch, Percocet.   CT lumbar shows \"Extensive postoperative changes between L2-S1. No recurrent disc herniation.\"   Son asked for Spine consult  Spoke with Spine PA - he recommended Flexion and extension Xrays.     Resolved Problems  Hyponatremia   Sepsis 2/2 PNA   Hypokalemia   Chronic Conditions (reviewed and stable unless otherwise stated)  Hypothyroidism   RLS   Osteoporosis   Anxiety       LDA: []CVC / []PICC / []Midline / []Graves / []Drains / []Mediport / [x]None  Antibiotics: None   Steroids: None   Labs (still needed?): [x]Yes / []No  IVF (still needed?): []Yes / [x]No    Level of care: [x]Step Down / []Med-Surg  Bed Status: [x]Inpatient / []Observation  Telemetry: [x]Yes / []No  PT/OT: [x]Yes / []No    DVT Prophylaxis: [] Lovenox / [] Heparin / [x] SCDs / [] Already on Systemic Anticoagulation / [] None   Code status: Full Code     Expected discharge date:  1-2 days   Disposition: Home      ===================================================================    Chief Complaint: Abdominal Pain   Subjective (past 24 hours): No acute events overnight. Pt resting in bed and states that her back still hurts. This provider and the son had long discussion about DC plan and the patient's ability to care for herself.       Medications:    Infusion Medications    sodium chloride      sodium chloride 10 mL/hr at 01/29/25 1334    Scheduled Medications    metoprolol  5 mg IntraVENous Q6H    pantoprazole  40 mg IntraVENous Daily    sodium chloride flush  5-40 mL 
  Physician Progress Note      PATIENT:               RAMESH MEDINA  CSN #:                  [id number]  :                       1945  ADMIT DATE:       2025 9:32 AM  DISCH DATE:  RESPONDING  PROVIDER #:        Brittany Macias MD          QUERY TEXT:    Pt admitted with Pneumonia and hiatal hernia. Pt noted to have WBC 14.7, LA   1.0, procal 0.77, -104/min and on Rocephin and Zithromax. If possible,   please document in the progress notes and discharge summary if you are   evaluating and /or treating any of the following:  The medical record reflects the following:  Risk Factors: Pneumonia  Clinical Indicators: WBC 14.7, LA 1.0, procal 0.77, -104/min  Treatment: Rocephin and Zithromax    Thank you. Violetta Fernandes RN, Clinical Documentation Integrity, [email or web address]   [employer]ycle, [facility], CRCR  Options provided:  -- Sepsis confirmed after study and was present on admission  -- No Sepsis, localized infection only  -- Other - I will add my own diagnosis  -- Disagree - Not applicable / Not valid  -- Disagree - Clinically unable to determine / Unknown  -- Refer to Clinical Documentation Reviewer    PROVIDER RESPONSE TEXT:    Sepsis confirmed after study and was present on admission    Query created by: Violetta Fernandes on 2025 7:31 AM      Electronically signed by:  Brittany Macias MD 2025 8:54 AM          
  Physician Progress Note      PATIENT:               RAMESH MEDINA  [facility] #:                  [id number]  :                       1945  ADMIT DATE:       2025 9:32 AM  DISCH DATE:  RESPONDING  PROVIDER #:        Antonio Gallo MD          QUERY TEXT:    Patient admitted with sepsis. Noted to have Malnutrition Status:  Severe   malnutrition (25 1017) by dietician. If possible, please document in   progress notes and discharge summary if you are evaluating and /or treating   any of the following:    The medical record reflects the following:  Risk Factors: Context:  Chronic Illness  Findings of the 6 clinical characteristics of malnutrition:  Energy Intake:  75% or less estimated energy requirements for 1 month or   longer (pt not specific, per EMR hx pt has been eating poorly for some time   now)  Weight Loss:   (pt down from last wt in EMR; BMI 14.3)  Body Fat Loss:  Severe body fat loss Orbital, Triceps, Fat Overlying Ribs,   Buccal region  Muscle Mass Loss:  Severe muscle mass loss Temples (temporalis), Scapula   (trapezius), Thigh (quadriceps), Calf (gastrocnemius), Hand (interosseous),   Clavicles (pectoralis & deltoids)  Fluid Accumulation:  No fluid accumulation  Clinical Indicators: Nutrition Assessment:  Pt. severely malnourished AEB criteria listed above.  At risk for further   nutritional compromise r/t suspected non-variceal UGIB - s/p EGD ; no   active bleeding, hiatal hernia seen, sepsis 2/2 PNA, chronic diarrhea,   hypokalemia, and underlying medical condition (PMHx: acid reflux, anxiety,   cancer, depression, HLD, osteoporosis, thyroid disease).  Treatment: ADULT ORAL NUTRITION SUPPLEMENT; Breakfast, Lunch, Dinner; Clear   Liquid Oral Supplement    ASPEN Criteria:    https://aspenjournals.onlinelibrary.cárdenas.com/doi/full/10.1177/277975787326450  5    Thank you.  Violetta Fernandes RN, Clinical Documentation Integrity, [employer]   [employer], [facility], [facility]  Options provided:  -- 
 [facility]  INPATIENT PHYSICAL THERAPY  DAILY NOTE  [facility] ONC MED 5K - 5K-05/005-A      Discharge Recommendations: Subacute/Skilled Nursing Facility  Equipment Recommendations: No               Time In: 1318  Time Out: 1347  Timed Code Treatment Minutes: 29 Minutes  Minutes: 29          Date: 2025  Patient Name: Letty Edge,  Gender:  female        MRN: [id number]  : 1945  (79 y.o.)     Referring Practitioner: Flori Syed APRN - CNP  Diagnosis: Upper GI bleed  Additional Pertinent Hx: Per EMR \"79-year-old female past medical history osteoporosis, anxiety, acid reflux, arthritis, depression, spinal stenosis, thyroid disease, pneumonia hyperlipidemia, history of blood clots and cancer presented with 4 days of abdominal pain with associated diarrhea.  Rates the pain in her abdomen as a 5 out of 10.  Patient relates that she called her son after she had diarrhea because she was unable to clean herself up and at that point EMS was called.  She has not seen any blood in her diarrhea.  She denies having any associated nausea or vomiting.  Per patient she was recently admitted at [facility] for pancreatitis but is unclear as to what caused the pancreatitis.  She has no history of alcohol use.  Patient reports having chronic pain in her back for which she uses fentanyl patches and oxycodone every 6 hours.  Patient reports pain in her back.  She also relates that her restless leg is increasing and requesting that her ropinirole be given to her.  No cardiac history.  No history of abdominal surgeries.  Denies fevers, chills, shortness of breath, chest pain, numbness, tingling, dysuria or any other symptoms at this time.  Patient is currently not anticoagulated.\"     Prior Level of Function:  Lives With: Alone  Type of Home: Mobile home  Home Layout: One level  Home Access: Stairs to enter with rails  Entrance Stairs - Number of Steps: 4-5 DENI  Entrance Stairs - Rails: Right  Home 
0745- BP-135/51, P- 105, RR- 28, O2 stat 91% RA, temp.- 97.9. Patient has 7/10 pain shoot in lower lumbar back. Patient is alert and oriented x4. Speech is clear and appropriate. PERRLA. Mucous membranes pink, moist and intact. No JVD noted. Respirations easy and unlabored. Lung sounds clear in all fields. Hear sounds rapid. Abdomen soft, tender, flat. Bowel sounds active x4 quadrants. Upper extremities skin warm dry, skin color appropriate for ethnicity. No edema noted in bilateral extremities. Hand grasp strong an equal, bilaterally. Capillary refill less then 2 seconds. Skin turgor greater then 3 seconds. ROM active in all extremities. Lower extremities skin warm, dry and appropriate for ethnicity. No edema noted in bilateral lower extremities. Pedal push and pull strong and equal bilaterally. Dorsalis and Pedis and posterior tibial pulses present bilaterally.Patient goals discussed at this time. Patient denies further needs or complaints. IV located in Right forearm. IV is intact, patent, nonpainful, without s/s of infiltration or phlebitis. Call light within reach. Nurys Jeffries RSC, SN.    
1008- pt. Is side lying right in bed with legs elevated, SCD's on bilaterally below the knees, resting. Bathroom offered, pt refused ambulation at this time. Patient is comfortable, call light within reach. Nurys Jeffries C, SN.  
1140 Report given to Daya RN  by this RN for patient to go to discharge lobby.  1151 Report called to jose at [facility] by this RN with all questions answered at this time.      1159 Patient discharge to discharge lobby with Daya RN. Patients IV was removed and patient discharged to lobby with all belongings.   
1146- pulse- 105, temp- 98.2, BP- 116/41, o2 sat 94 RA, RR 30. Pt bowels still moving, Large loose BM. Incentive spirometry used at this time to improve pts. Pneumonia. Head to toe unchanged from previous at 0720. Patient goals discussed at this time. Call light within reach. Nurys Jeffries RSC,SN.  
1509- ISBAR to Violetta GREEN.    
[facility]  STRZ ONC MED 5K  Occupational Therapy  Daily Note    Discharge Recommendations: Continue to assess pending progress, 24 hour assistance or supervision, and Patient would benefit from continued OT at discharge  Equipment Recommendations:  (continue to monitor for needs, may need BSC)      Time In: 1454  Time Out: 1511  Timed Code Treatment Minutes: 17 Minutes  Minutes: 17          Date: 2/3/2025  Patient Name: Letty Edge,   Gender: female      [facility]  MRN: [id number]  : 1945  (79 y.o.)  Referring Practitioner: Flori Syed APRN - CNP  Diagnosis: Upper GI bleed  Additional Pertinent Hx: Per EMR: \"79-year-old female past medical history osteoporosis, anxiety, acid reflux, arthritis, depression, spinal stenosis, thyroid disease, pneumonia hyperlipidemia, history of blood clots and cancer presented with 4 days of abdominal pain with associated diarrhea.  Rates the pain in her abdomen as a 5 out of 10.  Patient relates that she called her son after she had diarrhea because she was unable to clean herself up and at that point EMS was called.  She has not seen any blood in her diarrhea.  She denies having any associated nausea or vomiting.  Per patient she was recently admitted at [facility] for pancreatitis but is unclear as to what caused the pancreatitis.  She has no history of alcohol use.  Patient reports having chronic pain in her back for which she uses fentanyl patches and oxycodone every 6 hours.  Patient reports pain in her back.  She also relates that her restless leg is increasing and requesting that her ropinirole be given to her.  No cardiac history.  No history of abdominal surgeries.  Denies fevers, chills, shortness of breath, chest pain, numbness, tingling, dysuria or any other symptoms at this time.  Patient is currently not anticoagulated.\" : EGD w/o active bleed.    Restrictions/Precautions:  Restrictions/Precautions: Contact Precautions, 
Advance Care Planning     Advance Care Planning Inpatient Note  [facility] Department    Today's Date: 1/23/2025  Unit: [facility] CCU-STEPDOWN 3B    Received request from IDT Member.  Upon review of chart and communication with care team, patient's decision making abilities are not in question.. Patient and Child/Children was/were present in the room during visit.    Goals of ACP Conversation:  Discuss advance care planning documents    Health Care Decision Makers:     Primary Decision Maker: Garett Edge D. Son  [phone]     Summary:  Completed New Documents    Advance Care Planning Documents (Patient Wishes):  Healthcare Power of /Advance Directive Appointment of Health Care Agent  Living Will/Advance Directive     Assessment:  Letty is a 79 year old female propped up in her bed, awake and oriented to place and mind. Patient introduced her son, NETTE Edge to me and said she wanted him to be her POA and Health Care Decision Maker. During this conversation, patient said to both her son and I that she want to be a Full Code. She said she would like medical team to do everything possible to save her life in time of Cardiac Arrest. ACP document is completed, copy given to her son, copy sent to medical record and placed in patient file and original is given to patient to keep. Patient will remain as a Full Code according to her wishes.      Interventions:  Assisted in the completion of documents according to patient's wishes at this time    Care Preferences Communicated:     Hospitalization:  If the patient's health worsens and it becomes clear that the chance of recovery is unlikely,     the patient wants hospitalization.    Outcomes/Plan:  ACP Discussion: Completed    Electronically signed by FINESSE Perdue on 1/23/2025 at 5:37 PM           
[facility]  SPEECH THERAPY  STRZ ONC MED 5K  Modified Barium Swallow + Dysphagia Treatment    Discharge Recommendations: SNF and Continue to Assess Pending Progress  DIET ORDER RECOMMENDATIONS AFTER EVALUATION: Puree Diet with Thin Liquids via Straw Only   Strategies:  Set-up with Meals, Full Upright Position, Small Bite/Sip, Multiple Swallow, Pulmonary Monitoring, Medications Whole with Puree, Direct 1:1 Supervision, Alternate Solids and Liquids, Limit Distractions, and Monitor for Fatigue     SLP Individual Minutes  Time In: 1030  Time Out: 1050  Minutes: 20  Timed Code Treatment Minutes: 0 Minutes       Date: 2025  Patient Name: Letty Edge      CSN: [id number]   : 1945  (79 y.o.)  Gender: female   Referring Physician:  Yuliana Farooq MD   Diagnosis: Upper GI bleed [K92.2]  Precautions: Fall Risk, Aspiration Risk  History of Present Illness/Injury: Patient admitted to [facility] with above diagnosis; please see physician H&P for full report. Per chart review, \"79-year-old female past medical history osteoporosis, anxiety, acid reflux, arthritis, depression, spinal stenosis, thyroid disease, pneumonia hyperlipidemia, history of blood clots and cancer presented with 4 days of abdominal pain with associated diarrhea. Rates the pain in her abdomen as a 5 out of 10. Patient relates that she called her son after she had diarrhea because she was unable to clean herself up and at that point EMS was called. She has not seen any blood in her diarrhea. She denies having any associated nausea or vomiting. Per patient she was recently admitted at [facility] for pancreatitis but is unclear as to what caused the pancreatitis. She has no history of alcohol use. Patient reports having chronic pain in her back for which she uses fentanyl patches and oxycodone every 6 hours. Patient reports pain in her back. She also relates that her restless leg is increasing and requesting that her 
Colonoscopy completed, pt tolerated well. Biopsies taken, 1 jar labeled and sent to lab. Photos taken. Scope  used and .    
Comprehensive Nutrition Assessment    Type and Reason for Visit:  Initial, Positive nutrition screen (Weight Loss and Decreased Appetite)    Nutrition Recommendations/Plan:   Continue clear liquids pending colonoscopy; advancements per GI recommendations as tolerated/appropriate   Modify ONS: Ensure Clear (TID) - Apple  Will adjust ONS as appropriate  Pending colonoscopy results - will monitor for MNT diet education recommendations as appropriate. Monitor need for possible need for supplemental nutrition support if pt unable to meet nutrition needs via PO diet alone.  Per EMR, pt c/o poor PO d/t back pain - recommend consideration for pain management adjustments if not controlled?   Recommend to continue probiotic use     Malnutrition Assessment:  Malnutrition Status:  Severe malnutrition (01/27/25 1017)    Context:  Chronic Illness     Findings of the 6 clinical characteristics of malnutrition:  Energy Intake:  75% or less estimated energy requirements for 1 month or longer (pt not specific, per EMR hx pt has been eating poorly for some time now)  Weight Loss:   (pt down from last wt in EMR; BMI 14.3)     Body Fat Loss:  Severe body fat loss Orbital, Triceps, Fat Overlying Ribs, Buccal region   Muscle Mass Loss:  Severe muscle mass loss Temples (temporalis), Scapula (trapezius), Thigh (quadriceps), Calf (gastrocnemius), Hand (interosseous), Clavicles (pectoralis & deltoids)  Fluid Accumulation:  No fluid accumulation     Strength:  Not Performed    Nutrition Assessment:     Pt. severely malnourished AEB criteria listed above.  At risk for further nutritional compromise r/t suspected non-variceal UGIB - s/p EGD 1/23; no active bleeding, hiatal hernia seen, sepsis 2/2 PNA, chronic diarrhea, hypokalemia, and underlying medical condition (PMHx: acid reflux, anxiety, cancer, depression, HLD, osteoporosis, thyroid disease).       Nutrition Related Findings:    Pt. Report/Treatments/Miscellaneous: Pt seen - cachetic 
Discharge teaching and instructions for diagnosis/procedure of GI bleed completed with patient using teachback method. Patient voiced understanding regarding prescriptions, follow up appointments, and care of self at home. Discharged via cart/stretcher to  skilled nursing per EMS transportation   
EGD complete, photos taken 1specimens taken by forceps pt tolerated procedure well    Scope Numbe gif 585 used.      
Fentanyl patch applied as ordered, and old fentanyl patch removed and wasted per Sandra Gilliam RN and Lacie Higginbotham RN.  
Gastroenterology Progress Note:     Patient Name:  Letty Edge   MRN: [id number]  [id number]  YOB: 1945  Admit Date: 1/23/2025  9:32 AM  Primary Care Physician: Sakshi Sosa MD   3B-32/032-A     Patient seen and examined.  24 hours events and chart reviewed.    Subjective:   2 stools, loose  No blood/black stools  Feels she is having panic today  No N/V.     Objective:  BP (!) 178/67   Pulse (!) 102   Temp 97.4 °F (36.3 °C) (Oral)   Resp 24   Ht 1.6 m (5' 3\")   Wt 36.6 kg (80 lb 11 oz)   SpO2 100%   BMI 14.29 kg/m²     Review of Systems - General ROS: negative for - chills or fever  Gastrointestinal ROS: positive for - diarrhea, stool incontinence  negative for - abdominal pain, blood in stools, heartburn, hematemesis, melena, or nausea/vomiting     Physical Exam:    General:  Malnourished in no distress, cachetic appearing with temporal muscle wasting  HEENT: Atraumatic, normocephalic. Moist oral mucous membranes.  Neck: Supple without adenopathy, JVD, thyromegaly or masses. Trachea midline.  CV: Heart RRR, no murmurs, rubs, gallops.  Resp: Even, easy without cough or accessory use. Lungs clear to ascultation bilaterally.   Abd: Scaphoid abdomen, soft, non-tender. No hepatosplenomegaly or mass present. Active bowel sounds heard. No distention noted.   Ext:  Without cyanosis, clubbing, edema.   Skin: Pink, warm, dry  Neuro:  Alert, oriented x 3    Rectal: deferred    Labs:   CBC:   Lab Results   Component Value Date/Time    WBC 9.5 01/26/2025 06:17 AM    HGB 9.9 01/26/2025 06:17 AM    HCT 32.2 01/26/2025 06:17 AM    MCV 87.0 01/26/2025 06:17 AM     01/26/2025 06:17 AM     BMP:   Lab Results   Component Value Date/Time     01/26/2025 06:17 AM    K 3.3 01/26/2025 06:17 AM    K 3.1 01/23/2025 10:00 AM    CL 98 01/26/2025 06:17 AM    CO2 23 01/26/2025 06:17 AM    BUN 6 01/26/2025 06:17 AM    CREATININE 0.4 01/26/2025 06:17 AM    CALCIUM 9.0 01/26/2025 06:17 AM     PT/INR:   Lab 
Gastroenterology Progress Note:     Patient Name:  Letty Edge   MRN: [id number]  [id number]  YOB: 1945  Admit Date: 1/23/2025  9:32 AM  Primary Care Physician: Sakshi Sosa MD   3B-32/032-A     Patient seen and examined.  24 hours events and chart reviewed.    Subjective: Patient was scheduled for colonoscopy today however her prep was poor and she was still having Brown BM's so her procedure was moved to tomorrow to allow for full colon clean out.  Patient is having back pain.      Objective:  BP (!) 116/41   Pulse (!) 103   Temp 98.2 °F (36.8 °C) (Oral)   Resp 30   Ht 1.6 m (5' 3\")   Wt 36.6 kg (80 lb 11 oz)   SpO2 94%   BMI 14.29 kg/m²     Physical Exam  Vitals and nursing note reviewed.   Constitutional:       General: She is not in acute distress.     Appearance: She is ill-appearing.   HENT:      Mouth/Throat:      Mouth: Mucous membranes are moist.      Pharynx: Oropharynx is clear.   Eyes:      Pupils: Pupils are equal, round, and reactive to light.   Cardiovascular:      Rate and Rhythm: Normal rate and regular rhythm.      Pulses: Normal pulses.      Heart sounds: Normal heart sounds. No murmur heard.  Pulmonary:      Effort: Pulmonary effort is normal. No respiratory distress.      Breath sounds: Normal breath sounds. No stridor. No wheezing, rhonchi or rales.   Chest:      Chest wall: No tenderness.   Abdominal:      General: Abdomen is flat. Bowel sounds are normal. There is no distension.      Palpations: Abdomen is soft. There is no mass.      Tenderness: There is no abdominal tenderness. There is no guarding or rebound.      Hernia: No hernia is present.   Skin:     General: Skin is warm and dry.      Capillary Refill: Capillary refill takes less than 2 seconds.   Neurological:      Mental Status: She is alert. Mental status is at baseline.           Labs:   CBC:   Lab Results   Component Value Date/Time    WBC 11.2 01/28/2025 04:55 AM    HGB 9.1 01/28/2025 04:55 AM    
Gastroenterology Progress Note:     Patient Name:  Letty Edge   MRN: [id number]  [id number]  YOB: 1945  Admit Date: 1/23/2025  9:32 AM  Primary Care Physician: Sakshi Sosa MD   3B-32/032-A     Patient seen and examined.  24 hours events and chart reviewed.    Subjective: Patient is complaining of back pain and reports she believes this is why she can't eat.  She denies any heartburn or indigestion.      Objective:  BP (!) 154/73   Pulse 81   Temp 97.4 °F (36.3 °C) (Axillary)   Resp 18   Ht 1.6 m (5' 3\")   Wt 36.6 kg (80 lb 11 oz)   SpO2 97%   BMI 14.29 kg/m²     Physical Exam  Vitals and nursing note reviewed.   Constitutional:       General: She is not in acute distress.     Appearance: She is ill-appearing.   HENT:      Mouth/Throat:      Mouth: Mucous membranes are moist.      Pharynx: Oropharynx is clear.   Cardiovascular:      Rate and Rhythm: Normal rate and regular rhythm.      Pulses: Normal pulses.      Heart sounds: Normal heart sounds. No murmur heard.  Pulmonary:      Effort: Pulmonary effort is normal. No respiratory distress.      Breath sounds: Normal breath sounds. No stridor. No wheezing, rhonchi or rales.   Chest:      Chest wall: No tenderness.   Abdominal:      General: Abdomen is flat. Bowel sounds are normal. There is no distension.      Palpations: Abdomen is soft. There is no mass.      Tenderness: There is no abdominal tenderness. There is no guarding or rebound.      Hernia: No hernia is present.   Skin:     General: Skin is warm and dry.      Capillary Refill: Capillary refill takes less than 2 seconds.   Neurological:      Mental Status: She is alert. Mental status is at baseline.   Psychiatric:         Mood and Affect: Mood normal.         Behavior: Behavior normal.         Thought Content: Thought content normal.         Judgment: Judgment normal.           Labs:   CBC:   Lab Results   Component Value Date/Time    WBC 9.5 01/26/2025 06:17 AM    HGB 9.9 
Gastroenterology Progress Note:     Patient Name:  Letty Edge   MRN: [id number]  [id number]  YOB: 1945  Admit Date: 1/23/2025  9:32 AM  Primary Care Physician: Sakshi Sosa MD   3B-32/032-A     Patient seen and examined.  24 hours events and chart reviewed.    Subjective: No bleeding overnight.   Tolerated full liquids  Incontinent BM on the way to the BR this am, no blood noted.     Objective:  BP (!) 156/66   Pulse 98   Temp 98.3 °F (36.8 °C) (Oral)   Resp 17   Ht 1.6 m (5' 3\")   Wt 36.6 kg (80 lb 11 oz)   SpO2 95%   BMI 14.29 kg/m²     Review of Systems - General ROS: negative for - chills or fever  Gastrointestinal ROS: positive for - diarrhea, stool incontinence  negative for - abdominal pain, blood in stools, heartburn, hematemesis, melena, or nausea/vomiting     Physical Exam:    General:  Malnourished in no distress, cachetic appearing with temporal muscle wasting  HEENT: Atraumatic, normocephalic. Moist oral mucous membranes.  Neck: Supple without adenopathy, JVD, thyromegaly or masses. Trachea midline.  CV: Heart RRR, no murmurs, rubs, gallops.  Resp: Even, easy without cough or accessory use. Lungs clear to ascultation bilaterally.   Abd: Round, soft, non-tender. No hepatosplenomegaly or mass present. Active bowel sounds heard. No distention noted.   Ext:  Without cyanosis, clubbing, edema.   Skin: Pink, warm, dry  Neuro:  Alert, oriented x 3  However, repeating information multiple times for comprehension, mildly confused     Rectal: deferred    Labs:   CBC:   Lab Results   Component Value Date/Time    WBC 14.7 01/23/2025 10:00 AM    HGB 8.1 01/24/2025 04:24 AM    HCT 25.0 01/24/2025 04:24 AM    MCV 82.2 01/23/2025 10:00 AM     01/23/2025 10:00 AM     BMP:   Lab Results   Component Value Date/Time     01/23/2025 10:00 AM    K 3.1 01/23/2025 10:00 AM     01/23/2025 10:00 AM    CO2 27 01/23/2025 10:00 AM    BUN 14 01/23/2025 10:00 AM    CREATININE 0.4 
Hourly rounds maintained. Patient given 10 mg of oxycodone for 10/10 pain in lower back. No changes from previous physical assessment. Vital signs taken and recorded. BP was 108/45 and was reported to primary RN. Patient room was cleaned. Patient brief was checked and was dry. Patient pain was reassessed and reported pain as 7/10 on 0-10 pain scale. Patient reminded that next pain medication available at 2330. Patient left in comfortable position with call light within reach.   Report given to primary RN.  
Hourly rounds maintained. Patient resting in bed. Pain assessed and reports that the pain has stabilized. Pain was previously a 10/10. Next dose of oxycodone available at 1900. Patient left with call light within.   
Hourly rounds maintained. Two medications given Protonix 40 mg tablet and sucralfate 1 gram tablet. Patient stated a pain 10/10 on 0-10 pain scale. Patient reminded that next dose of oxycodone is at 1900. Patient left eating supper with call light in reach. Ice pack applied to lower back.   
Informed by transport that they would be arriving within the hour. [facility] called and notified of updated transport time. AVS, CHAGO, and last dose MAR faxed to [facility]   
José English NP with GI instructed she will order docusate enema. If stool is not clear by 0700 on 1/29/25 notify GI for further instructions.   
Letty admitted to Endo department and admitted to Endo room 11 for colonoscopy with dr. Johnson. Consent form signed and verified with pt.   Plan of care reviewed with patient.   Call light within reach.   Allergies reviewed with pt.  Bed in lowest position, locked, with one bed rail up.   Appropriate arm bands on patient.   Bathroom offered.   All questions and concerns of patient addressed    
[facility]  INPATIENT PHYSICAL THERAPY  EVALUATION  [facility] CCU-STEPDOWN 3B - 3B-32/032-A    Discharge Recommendations: Continue to assess pending progress, Anticipate home with  services  Equipment Recommendations: No             Time In: 1339  Time Out: 1410  Timed Code Treatment Minutes: 23 Minutes  Minutes: 31          Date: 2025  Patient Name: Letty Edge,  Gender:  female        MRN: [id number]  : 1945  (79 y.o.)      Referring Practitioner: Flori Syed APRN - CNP  Diagnosis: Upper GI bleed  Additional Pertinent Hx: Per EMR \"79-year-old female past medical history osteoporosis, anxiety, acid reflux, arthritis, depression, spinal stenosis, thyroid disease, pneumonia hyperlipidemia, history of blood clots and cancer presented with 4 days of abdominal pain with associated diarrhea.  Rates the pain in her abdomen as a 5 out of 10.  Patient relates that she called her son after she had diarrhea because she was unable to clean herself up and at that point EMS was called.  She has not seen any blood in her diarrhea.  She denies having any associated nausea or vomiting.  Per patient she was recently admitted at [facility] for pancreatitis but is unclear as to what caused the pancreatitis.  She has no history of alcohol use.  Patient reports having chronic pain in her back for which she uses fentanyl patches and oxycodone every 6 hours.  Patient reports pain in her back.  She also relates that her restless leg is increasing and requesting that her ropinirole be given to her.  No cardiac history.  No history of abdominal surgeries.  Denies fevers, chills, shortness of breath, chest pain, numbness, tingling, dysuria or any other symptoms at this time.  Patient is currently not anticoagulated.\"     Restrictions/Precautions:  Restrictions/Precautions: Contact Precautions, Fall Risk            Required Braces or Orthoses?: No      Subjective:  Chart Reviewed: Yes  Patient assessed for 
No changes since last assessment. Pt. Still has complaints of sharp back pain rating a 9/10. Pt. awake in bed with call light in reach.   
Notified by nursing staff that patient having same consistency brown stools despite the dose of magnesium citrate.      Order for Enemeez enema once.   If stools not clear by 0700 please notify GI provider on call.    
[facility]  STRZ ONC MED 5K  Occupational Therapy  Daily Note    Discharge Recommendations: Subacute/skilled nursing facility  Equipment Recommendations:  (continue to monitor for needs, may need BSC)      Time In: 1110  Time Out: 1133  Timed Code Treatment Minutes: 23 Minutes  Minutes: 23          Date: 2025  Patient Name: Letty Edge,   Gender: female      Room: [address] [place] [address]  MRN: [id number]  : 1945  (79 y.o.)  Referring Practitioner: Flori Syed APRN - CNP  Diagnosis: Upper GI bleed  Additional Pertinent Hx: Per EMR: \"79-year-old female past medical history osteoporosis, anxiety, acid reflux, arthritis, depression, spinal stenosis, thyroid disease, pneumonia hyperlipidemia, history of blood clots and cancer presented with 4 days of abdominal pain with associated diarrhea.  Rates the pain in her abdomen as a 5 out of 10.  Patient relates that she called her son after she had diarrhea because she was unable to clean herself up and at that point EMS was called.  She has not seen any blood in her diarrhea.  She denies having any associated nausea or vomiting.  Per patient she was recently admitted at [facility] for pancreatitis but is unclear as to what caused the pancreatitis.  She has no history of alcohol use.  Patient reports having chronic pain in her back for which she uses fentanyl patches and oxycodone every 6 hours.  Patient reports pain in her back.  She also relates that her restless leg is increasing and requesting that her ropinirole be given to her.  No cardiac history.  No history of abdominal surgeries.  Denies fevers, chills, shortness of breath, chest pain, numbness, tingling, dysuria or any other symptoms at this time.  Patient is currently not anticoagulated.\" : EGD w/o active bleed.    Restrictions/Precautions:  Restrictions/Precautions: Contact Precautions, Fall Risk     Social/Functional History:  Lives With: Alone  Type of Home: Mobile home  Home 
[facility]--HOSPITALIST GROUP    Hospitalist PROGRESS NOTE dictated by Yuliana Farooq MD on 2/3/2025    Patient ID: Letty dEge is 79 y.o. and presently in room [facility]  : 1945 MRN: [id number]    Admit date: 2025  Primary Care Physician: Sakshi Sosa MD   Patient Care Team:  Sakshi Sosa MD as PCP - General  Tel: [phone]  Admitting Physician: Inderjit Urena MD   Code Status:  DNR-CC    Letty Edge is a 79 y.o.  female who presented with No chief complaint on file.    Room: [facility]  Admit date: 2025    Chief Complaint: abd pain, diarrhea      HPI: 79-year-old female past medical history osteoporosis, anxiety, acid reflux, arthritis, depression, spinal stenosis, thyroid disease, pneumonia hyperlipidemia, history of blood clots and cancer presented with 4 days of abdominal pain with associated diarrhea.  Rates the pain in her abdomen as a 5 out of 10.  Patient relates that she called her son after she had diarrhea because she was unable to clean herself up and at that point EMS was called.  She has not seen any blood in her diarrhea.  She denies having any associated nausea or vomiting.  Per patient she was recently admitted at [facility] for pancreatitis but is unclear as to what caused the pancreatitis.  She has no history of alcohol use.  Patient reports having chronic pain in her back for which she uses fentanyl patches and oxycodone every 6 hours.  Patient reports pain in her back.  She also relates that her restless leg is increasing and requesting that her ropinirole be given to her.  No cardiac history.  No history of abdominal surgeries.  Denies fevers, chills, shortness of breath, chest pain, numbness, tingling, dysuria or any other symptoms at this time.  Patient is currently not anticoagulated.  ------------    2025:Patient has chronic low back pain so discussed with nursing trial of lidocaine patch as 
Patient c/o pain in abdomen radiating to her right side. TRICIA Vasquez aware and instructed to monitor overnight.   Stool continues to be brown today after 2 enemas and a bottle of magnesium citrate. Stool hasn't changed in color or consistency. DEE DEE English notified.   
Patient educated on how to use incentive spirometer. Patient verbalized understanding and demonstrated proper use. Emphasized importance and usage of device, with coughing and deep breathing every 2 hours while awake.        
Patient is in phase 2 , taking f  discussed findings, plan of care, discharge instructions with pt and .    Report called to Honey on 3 b  
Pharmacy Medication History Note    List of current medications patient is taking is complete.    Source of information: Chelsea Barry     Changes made to medication list:  Medications removed (include reason, ex. therapy complete or physician discontinued):  Alendronate- last fill July 2020  Bupropion- Last fill March 2024 for 90 days  Cymbalta- Last fill August 2024 for 90 days  Lasix- Last fill March 2019  Gabapentin- last fill July 2024  Carafate- last fill March 2024  Triamcinolone- last filled 2019    Medications added/doses adjusted:  Percocet- changed to 5/325 mg every 6 hours PRN  Pantoprazole changed to once daily  Bethanechol added  Flexeril added  Fentanyl patch added  Meloxicam added  Ropinirole added    Other notes (ex. Recent course of antibiotics, Coumadin dosing):  Unable to confirm if pt is still taking Librium- last filled in October 2024 for 30 day supply  Unable to confirm if still taking ibuprofen and how taking   Denies use of other OTC or herbal medications.    Allergies reviewed    Electronically signed by Carri Batista RPH on 1/23/2025 at 11:30 AM   
Pt transferred to  5K5 via via cart/stretcher from 3B.  Complaints: Upper GI bleed.    With G.22 IV cannula on right forearm patent and intac with no IV fluid infusing. IV site free of s/s of infection or infiltration. Vital signs obtained. Assessment and data collection initiated. Two nurse skin assessment performed by this RN and Chris Lincoln RN. Oriented to room. Policies and procedures for  explained. All questions answered with no further questions at this time. Fall prevention and safety brochure discussed with patient.  Bed alarm on. Call light in reach.Oriented to room.   Fauzia Powell RN, 1/30/2025 1950    
Pt. Resting in bed. Pt. Still has complaints of pain 10/10. Call light in reach.   
Pt. alert and oriented X4, clear and appropriate judgement and speech. PERRL. Upper extremities warm, dry, appropriate for age and ethnicity. Sensation present, no numbness or tingling. Hand grasp moderate and equal. Capillary refill and skin turgor less than 3 sec.Lung sounds diminished bilaterally, symmetrical chest movement, unlabored. Heart rate 108 with regular rhythm. Bowels active X4, no tenderness. Lower extremities warm, dry, appropriate for age and ethnicity, sensation present, no numbness or tingling. Pedal push and pull moderate and equal. No edema present. Pt. Complains of sharp back pain rating 10/10. Pt. Awake in bed, call light in reach. Pt. Voices no other concerns.   
Received report from PETER Michel.   
Received report from primary RN, pt complained of lumbar pain and received Carafate.   Entered room to complete physical assessment, pt found resting and alone in room.  Pt confirmed name and  was confirmed via wristband  Pt alert and oriented x 1 to name  Pt states \"I have pain in my lumbar area\" not able to verbalize pain on scale of 1-10. Speech clear but unoriented.  Pupils equal and reactive to light  Upper extremities pink, warm, dry and sensation present, no numbness or tingling   Temp 36.8 C  Capillary refill less than 4 seconds, skin turgor less than 5 seconds  Hand grasp strong and equal, upper extremity movement present in left arm  HR 90, regular rythym, strong amplitude  Lung sounds equal bilaterally, symmetrical chest movement, lungs clear throughout,   Respiratory Rate 12  Abdomen Flat, bowel sounds active and heard throughout all 4 quadrants  Lower extremeties pink, warm and dry, sensation present, no tingling or pain  Pedal push not existant, pull strong with restricted movement of the lower extremities.  Moves in bed with difficulty, no edema   Bp 116/47  Intravenous catheter in right forearm  External urinary catheter present        
Recovery mode. Dr. Johnson discussed findings with pt. Report called to PETER Wallace  
Report received from primary RN Honey on patient per admission to unit. Patient is laying in bed respirations unlabored. Mane FONTENOT/.  
Report received from primary RN.  Physical assessment performed. Patient is alert and oriented x4. Pupils are equal and reactive to light. They have a consensual response and constrict from 3 mm to 2 mm. Oral cavity was inspected and is found to be without lesions. Patient has telemetry and is intact. Patient heart sounds were ausculted and are regular rhythm. Lungs sound clear with symmetrical chest movement. Upper extremity movement is purposeful and equal. Hand grasp is strong and equal bilaterally. Capillary refill is less than 3 seconds. Skin turgor is less than 3 seconds. Skin is warm, dry, and pink. No numbness or tingling present. Abdomen palpated with no pain. Abdomen is flat and without masses. Bowel sounds present in the lower 2 quadrants. Lower extremity movement is purposeful and equal. Skin is warm, dry, and pink. Dorsalis pedis pulse palpated bilaterally. No edema is present. IV site is clean and intact. IV located in right forearm. Redness noted around the patient's rectum. Protective cream was placed at this site. Bony prominences checked and there is no skin breakdown present. Patients vitals were taken and recorded. Patient's pain was 10/10 on pain scale of 0-10. RN notified and RN gave oxycodone 10 mg to patient. Patient was taken to the bathroom. Brief was changed and bed sheets were changed. Patient ambulated to the bathroom with assistance while using walker and gait belt. Patient left in bed in comfortable position with call light within reach.   
Reported off to Honey rosales RN. Patient is laying in bed respirations unlabored. Mane RSCECI/.  
Reviewed CDiff rule out with Dr Macias. Does not believe it is CDiff. Okay to remove order for stool sample.  Infection Control updated.   
[facility]  INPATIENT OCCUPATIONAL THERAPY  [facility] CCU-STEPDOWN 3B  EVALUATION      Discharge Recommendations: Continue to assess pending progress, 24 hour supervision or assist, Patient would benefit from continued therapy after discharge  Equipment Recommendations:  (continue to monitor for needs, may need BSC)        Time In: 0840  Time Out: 0916  Timed Code Treatment Minutes: 23 Minutes  Minutes: 36          Date: 2025  Patient Name: Letty Edge,   Gender: female      MRN: [id number]  : 1945  (79 y.o.)  Referring Practitioner: Flori Syed APRN - CNP  Diagnosis: Upper GI bleed  Additional Pertinent Hx: Per EMR: \"79-year-old female past medical history osteoporosis, anxiety, acid reflux, arthritis, depression, spinal stenosis, thyroid disease, pneumonia hyperlipidemia, history of blood clots and cancer presented with 4 days of abdominal pain with associated diarrhea.  Rates the pain in her abdomen as a 5 out of 10.  Patient relates that she called her son after she had diarrhea because she was unable to clean herself up and at that point EMS was called.  She has not seen any blood in her diarrhea.  She denies having any associated nausea or vomiting.  Per patient she was recently admitted at [facility] for pancreatitis but is unclear as to what caused the pancreatitis.  She has no history of alcohol use.  Patient reports having chronic pain in her back for which she uses fentanyl patches and oxycodone every 6 hours.  Patient reports pain in her back.  She also relates that her restless leg is increasing and requesting that her ropinirole be given to her.  No cardiac history.  No history of abdominal surgeries.  Denies fevers, chills, shortness of breath, chest pain, numbness, tingling, dysuria or any other symptoms at this time.  Patient is currently not anticoagulated.\" : EGD w/o active bleed.    Restrictions/Precautions:  Restrictions/Precautions: Contact Precautions, 
SBAR report given to PETER Dean.   
Spiritual Health History and Assessment/Progress Note  [facility]    Spiritual/Emotional Needs,  ,  ,      Name: Letty Edge MRN: [id number]    Age: 79 y.o.     Sex: female   Language: English   Catholic: Yazidi   Upper GI bleed     Date: 2/3/2025            Total Time Calculated: (P) 8 min              Spiritual Assessment began in [facility] ONC MED 5K        Referral/Consult From: (P) Rounding   Encounter Overview/Reason: Spiritual/Emotional Needs  Service Provided For: Patient    Daya, Belief, Meaning:   Patient identifies as spiritual  Family/Friends No family/friends present      Importance and Influence:  Patient has spiritual/personal beliefs that influence decisions regarding their health  Family/Friends have spiritual/personal beliefs that influence decisions regarding the patient's health    Community:  Patient feels well-supported. Support system includes: Extended family  Family/Friends No family/friends present    Assessment and Plan of Care:   In my encounter with the 79 yr old Palliative Care patient, while rounding  the unit 5K,  I provided spiritual care to patient through conversation, I also came to assess the patient's spiritual needs present. The pt was admitted due to upper GI bleed.     Patient Interventions include: Facilitated expression of thoughts and feelings  Family/Friends Interventions include: Facilitated expression of thoughts and feelings    Patient Plan of Care: Spiritual Care available upon further referral  Family/Friends Plan of Care: Spiritual Care available upon further referral    Electronically signed by FINESSE Kennedy on 2/3/2025 at 9:56 AM   
[facility]--HOSPITALIST GROUP    Hospitalist PROGRESS NOTE dictated by Yuliana Farooq MD on 2025    Patient ID: Letty Edge is 79 y.o. and presently in room [facility]  : 1945 MRN: [id number]    Admit date: 2025  Primary Care Physician: Sakshi Sosa MD   Patient Care Team:  Sakshi Sosa MD as PCP - General  Tel: [phone]  Admitting Physician: Inderjit Urena MD   Code Status:  DNR-CC    Letty Edge is a 79 y.o.  female who presented with No chief complaint on file.    Room: [facility]  Admit date: 2025    Chief Complaint: abd pain, diarrhea      HPI: 79-year-old female past medical history osteoporosis, anxiety, acid reflux, arthritis, depression, spinal stenosis, thyroid disease, pneumonia hyperlipidemia, history of blood clots and cancer presented with 4 days of abdominal pain with associated diarrhea.  Rates the pain in her abdomen as a 5 out of 10.  Patient relates that she called her son after she had diarrhea because she was unable to clean herself up and at that point EMS was called.  She has not seen any blood in her diarrhea.  She denies having any associated nausea or vomiting.  Per patient she was recently admitted at [facility] for pancreatitis but is unclear as to what caused the pancreatitis.  She has no history of alcohol use.  Patient reports having chronic pain in her back for which she uses fentanyl patches and oxycodone every 6 hours.  Patient reports pain in her back.  She also relates that her restless leg is increasing and requesting that her ropinirole be given to her.  No cardiac history.  No history of abdominal surgeries.  Denies fevers, chills, shortness of breath, chest pain, numbness, tingling, dysuria or any other symptoms at this time.  Patient is currently not anticoagulated.  ------------    2025:Patient has chronic low back pain so discussed with nursing trial of lidocaine patch as 
Transfer  Report from [facility]  Referring Physician Dr Gardner  Accepting Physician Dr Urena  Patient Condition: [birth year]. 78 [facility] at [facility] presents to ER 48 hours ago with c/o confusion. She was found on the floor with dark stool over her. Her EKG was unremarkable, CT of chest showed bilateral lower lobe pneumonia, CT of abdomen shows dilation of intra and extra hepatic ducts, and distension of the gall bladder. Her stool is hemoccult positive. Labs LFT's (-), BUN was 40, now 30, Hgb was 10.1, next check 7.5, and is now 6.1, WBC 23, down to 18, procal 1.2. She was treated with Protonix, Azithromycin, Rocephin, and 2 units of PRBC. Vitals 97.6 , resp 23, b/p 134/64, 99% on RA.    Accepted on behalf of Dr Urena to 3A32 with diagnosis of Upper GI Bleed.    t    
[facility]  OCCUPATIONAL THERAPY MISSED TREATMENT NOTE  [facility] CCU-STEPDOWN 3B  3B-32/032-A      Date: 2025  Patient Name: Letty Edge        CSN: [id number]   : 1945  (79 y.o.)  Gender: female   Referring Practitioner: Flori Syed APRN - CNP            REASON FOR MISSED TREATMENT: Unable to keep eyes open.  Will try back next treatment day           '  
dietary analysis with breakfast meal consisting of: pureed waffle and thin liquids via straw with chin tuck. ST with f/u conversation with patient re: utilization of chin tuck with patient expressing preference to utilize chin tuck given increased comfort and success with feeding; ST in agreement to include utilization of chin tuck for consumption of all liquids with minimal cuing to implement consistently. Patient with need for at least minimal assistance with tray navigation and oral initiation. Fair mastication, bolus control/formation, and AP transit of puree textures consumed with no oral residuals to follow. No overt s/s of airway invasion documented this date, though certainly cannot r/o airway invasion without formal instrumentation. Patient with minimal cuing for utilization of compensatory swallowing strategies throughout meal.     Recommendations for continuation of puree diet with thin liquids via straw with CHIN TUCK and implementation of compensatory swallowing strategies.    Patient with call light in place and withOUT respiratory distress upon leaving room; RN Anand notified re: recommendations with verbal receptiveness noted.    SHORT TERM GOAL #2:  Goal 2: Patient will complete pharyngeal strengthening exercises x10 with fair-good success to assist with improvement of swallow function and maintanance of pharyngeal integrity.  INTERVENTIONS: DNT due to focus on additional STGs.    Long-Term Goals:  No LTGs established due to short ELOS.    Functional Oral Intake Scale: Total Oral Intake: 5.  Total oral intake of multiple consistencies requiring special preparation    EDUCATION:  Learner: Patient, RN  Education:  Reviewed diet and strategies, Reviewed signs, symptoms and risks of aspiration, Reviewed ST goals and Plan of Care, and Reviewed recommendations for follow-up  Evaluation of Education: Demonstrates with assistance, Needs further instruction, and Family not 
diverticulosis, biopsies pending - regular diet ordered per provider; plan ECF at discharge  1/27-Pt seen - cachetic in appearance, appears very anxious this AM. When RD asked how she was eating PTA, she said \"I don't know.\" She states she will eat a few times a day, when asked if she was able to eat full meals or just small snacks, she noted \"50/50.\" Per EMR she has been eating very poorly d/t uncontrolled back pain.   GI Status: BM 1/31  Pertinent Labs: 1/29: Glucose 78, BUN 20, Cr 0.7  Pertinent Meds: Imodium, Culturelle, Carafate, PPI, Zofran      Wound Type: Stage I (coccyx)       Current Nutrition Intake & Therapies:    Average Meal Intake: 0%, 1-25%, 51-75%  Average Supplements Intake:  (prefers Premier shakes -enc from home)  ADULT DIET; Regular    Anthropometric Measures:  Height: 160 cm (5' 3\")  Ideal Body Weight (IBW): 115 lbs (52 kg)    Admission Body Weight: 36.6 kg (80 lb 11 oz) (1/23: no edema)  Current Body Weight: 36.6 kg (80 lb 11 oz) (1/23: no edema),     Current BMI (kg/m2): 14.3  Usual Body Weight:  (per EMR: 10/7/20: 128# 10 oz)        Weight Adjustment For: No Adjustment                 BMI Categories: Underweight (BMI less than 18.5)    Estimated Daily Nutrient Needs:  Energy Requirements Based On: Kcal/kg  Weight Used for Energy Requirements: Admission (36.6 kg)  Energy (kcal/day): 4932-2921+ grams (35-40+ kcals/kg)  Weight Used for Protein Requirements: Admission (36.6 kg)  Protein (g/day): 55+ grams (1.5+ grams/kg)       Nutrition Diagnosis:   Severe malnutrition, in context of chronic illness related to altered GI function as evidenced by criteria as identified in malnutrition assessment    Nutrition Interventions:   Food and/or Nutrient Delivery: Continue Current Diet, Discontinue Oral Nutrition Supplement (Encouraged ONS from home)  Nutrition Education/Counseling: Education/Counseling initiated  Coordination of Nutrition Care: Continue to monitor while inpatient       Goals:  Goals: PO 
ropinirole 2 mg BID      #osteoporosis   -hold at home alendronate given concern for UGIB     #Anxiety  Continue home Lipram     # Cachexia  #Malnutrition  Patient with BMI of 14.29, she said that she has been always on the skinnier side but report significant weight loss but was not sure over how long she said that few years she used to weigh 140 pound now she is down to 80 pound, she think it is because of her very low appetite due to her uncontrolled low back pain!  She just had CT chest abdomen pelvis at outside hospital in December/2024 which did not show any evidence of malignancy  She also recently underwent ERCP in outside hospital and no pancreatic mass was noted  Had  44.5 ( NORMAL < 35 ) ,CA 19-9 of 90  ( NORMAL <35)  She will need a colonoscopy on a nonurgent basis to complete the workup for occult malignancy especially given her anemia and diarrhea, patient will follow-up as outpatient with Dr. Crowley as he is closer to home         LDA: []CVC / []PICC / []Midline / []Graves / []Drains / []Mediport / [x]None  Antibiotics: Ceftriaxone/azithromycin  Steroids: None  Labs (still needed?): [x]Yes / []No  IVF (still needed?): []Yes / [x]No    Level of care: []Step Down / [x]Med-Surg  Bed Status: [x]Inpatient / []Observation  Telemetry: [x]Yes / []No  PT/OT: [x]Yes / []No    DVT Prophylaxis: [] Lovenox / [] Heparin / [] SCDs / [] Already on Systemic Anticoagulation / [] None     Expected discharge date: In the next 1 to 2 days  Disposition: Home with home health     Code status: Full Code     ===================================================================    Chief Complaint:  abd pain, diarrhea   Subjective (past 24 hours):   Patient seen and examined sitting in bed looks comfortable  She is upset and frustrated , had numerous complaints she said that she has been uncomfortable with a stuffy nose and does not feel she is breathing right and asking for a nasal spray, also complaining of back pain and 
Ativan 0.25 mg oral every 6 hours as needed for increased anxiety, agitation or shortness of breath  Continue Megace 20mg every twice a day scheduled for worsening decreased appetite  Patient planning to follow up with [facility] to discuss possible options for her back pain  Will refer to Palliative Care Clinic on discharge  Palliative Care will continue to follow the patient while they are hospitalized   Please Perfect[employer]erve or call the Palliative Care team with any questions or concerns    CURRENT CODE STATUS:  DNR-CC No additional code details     Case reviewed and discussed with Dr. Sanchez    Parts of this note may have been dictated by use of voice recognition software and electronically transcribed. The note may contain errors not detected in proofreading    Electronically signed by JAELYN Fowler CNP on 2/3/2025 at 9:24 AM           Palliative Care Office: [phone]     
Pylori, possible remote PUD with healing and prepyloric deformity.  ERCP 12/5/24 with Dr. Crowley at [facility] for ampullary stenosis and sphincterotomy and was done due to the dilated pancreatic duct and biliary tree in which she developed post ERCP pancreatitis.  10/30/24 her CA 19-9 was elevated at 90.   elevated at 44.5.  She had dark diarrhea stools, was on iron supplement orally, and experienced loss of appetite.  1/23/25 EGD with focal ulceration, remote PUD with healing, whitish coating of medications vs barium, large hiatal hernia with pathology results pending.      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   GI signing off please call with any questions/concerns.     Case reviewed and impression/plan reviewed in collaboration with Dr. Sandor Johnson.   Electronically signed by JAELYN Deluna - CNP on 1/30/2025 at 11:08 AM    [email or web address]            
SOB, effortful swallow    Functional Oral Intake Scale: Total Oral Intake: 5.  Total oral intake of multiple consistencies requiring special preparation    Impressions: Patient presents with mild oral dysphagia with inability to fully discern potential presence of pharyngeal phase deficits without formal instrumentation. Patient with appropriate bolus control/manipulation and prolonged rotary mastication with eventual effective textural breakdown. Reduced bolus formation given presence of mild lingual residue following soft solid trials. Residue successfully cleared with utilization of prompted liquid wash. Concerns for decreased airway protection or pharyngeal residue given presence of x1 throat clear, effortful swallow, and increase shortness of breath. Of course, pharyngeal dysfunction and totality of airway invasion events cannot be discerned at bedside alone, therefore, recommend completion of MBS to further assess pharyngeal swallow function.     Recommend downgrade to soft and bite size diet and thin liquids. ST to follow for potential completion of MBS to further assess pharyngeal swallow function.    *post evaluation, patient without respiratory distress upon leaving room; RN Gisela notified re: clinical findings and recommendations from the assessment; verbal receptiveness noted       RECOMMENDATIONS/ASSESSMENT:  Instrumental Evaluation: Modified Barium Swallow (MBS)  Rehabilitation Potential: guarded    EDUCATION:  Learner: Patient  Education:  Reviewed results and recommendations of this evaluation, Reviewed diet and strategies, Reviewed signs, symptoms and risks of aspiration, Reviewed ST goals and Plan of Care, and Reviewed recommendations for follow-up  Evaluation of Education: Verbalizes understanding, Needs further instruction, and Family not present    PLAN:  Skilled SLP intervention on acute care 1-3 x per week or until goals met and or patient plateaus in function.  Specific interventions for next 
Stand:  Minimal Assistance, with increased time for completion, cues for hand placement, decreased force production.    Stand to Sit: Contact Guard Assistance, with increased time for completion, cues for alignment to surface and for safety with assistive device.      FUNCTIONAL MOBILITY:  Assistive Device: Straight Cane  Assist Level:  Minimal Assistance and with verbal cues .   Distance:  bedside chair>EOB  **Unsteady, quickly fatigues. Recommendations given for use of RW rather than straight cane due to need for increased stability/support during fxnl mobility.      **Despite encouragement, pt declined any other activities at this time due to fatigue.     AM-PAC Inpatient Daily Activity Raw Score: 15     Modified Daniele:  Current Functional Status:  Not Applicable    ASSESSMENT:     Activity Tolerance:  Patient tolerance of  treatment: Poor treatment tolerance and Limited by fatigue       Plan: Times Per Week: 4-5x  Current Treatment Recommendations: Strengthening, Balance training, Functional mobility training, Endurance training, Cognitive reorientation, Pain management, Safety education & training, Patient/Caregiver education & training, Equipment evaluation, education, & procurement, Positioning, Self-Care / ADL, Home management training    Education:  Learners: Patient  Plan of Care, Importance of Increasing Activity, and safety with transfers/mobility, discharge options/recommendations    Goals  Short Term Goals  Time Frame for Short Term Goals: by discharge  Short Term Goal 1: Pt will complete ADL transfer w/ SUP via LRD.  Short Term Goal 2: Pt will complete LE dressing w/ Mod assist and LHAD PRN.  Short Term Goal 3: Pt will complete dynamic standing task >10 min for improved sinkside grooming tolerance.  Short Term Goal 4: Pt will complete functional mobility at household distances with 0-2 cues for safety awareness and technique.  Long Term Goals  Time Frame for Long Term Goals : No LTGs set 2/2 short 
elderly appearing, cachetic.      1/25/2025:   Hg 8.6. 2 stools recorded last 24 hours   No stool collected as she has had none since her incontinent episode yesterday.   We reviewed her case from the GI standpoint, and an update concerning her additional concerns was given to the primary service and nursing.     Anemia, Hg stable 8.6  History suspected PUD on prior EGD  History ampullary stenosis s/p prior ERCP/sphincterotomy  Weight loss  Cachexia, unclear causation  Diarrhea  Pneumonia, on antiobiotics  Leukocytosis, mild, 14.1  History pancreatitis    Plan:    Protonix 40 mg IV BID until discharge and then should take PO twice daily indefinitely  Collect stool studies if further stools  H&H, transfuse PRN  Antibiotics per primary service  SCD's for mechanical DVT prophylaxis, no anticoagulation due to recent GIB  Pt plans to follow up with Dr. Crowley closer to home for her, please schedule a follow up visit with him in 4-6 weeks to address need for colonoscopy in light of her weight loss, anemia and diarrhea history.   GI following    Case reviewed and impression/plan reviewed in collaboration with Dr. Hernandez    Electronically signed by Maddi Lima, JAELYN - CNP on 1/25/2025 at 9:30 AM    [employer] [facility]   
AND FLEXION AND EXTENSION    Result Date: 1/29/2025  Lumbar spine x-ray: 6 views. Indication: Back pain. Comparison: None Findings: Posterior lumbosacral iliac fusion L2-S2. Intact fixation hardware. L4-L5 intervertebral disc device. Grade 1 anterolisthesis L4 on L5. Posterior lumbar decompression. Chronic L2 anterior wedging fracture. Moderate L1-L2 degenerative disc disease. Mild grade 1 anterolisthesis L1-L2 on the flexion view and mild retrolisthesis on the neutral view. Calcifications left upper quadrant.     Impression: 1. 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 2. Chronic L2 anterior wedging fracture. 3. Posterior lumbosacral iliac fusion and posterior lumbar decompression. This document has been electronically signed by: Boyd Pemberton MD on 01/29/2025 10:41 PM    Colonoscopy    Result Date: 1/29/2025  No dictation     CT LUMBAR SPINE WO CONTRAST    Result Date: 1/28/2025  PROCEDURE: CT LUMBAR SPINE WO CONTRAST CLINICAL INFORMATION: Severe back pain. COMPARISON: Plain radiographs dated 11/6/2019. TECHNIQUE: 3 mm noncontrast axial images were obtained of the lumbar spine. Sagittal and coronal reconstructions were obtained. Angled images were reconstructed through the L3-4, L4-5 and L5-S1 disc levels. All CT scans at this facility use dose modulation, iterative reconstruction, and/or weight-based dosing when appropriate to reduce radiation dose to as low as reasonably achievable. FINDINGS: There are postoperative changes with bilateral pedicular screws at L2, L3, L4  and S1 and a left-sided pedicular screw at L5. There is a right pedicular screw tract at L5. There are bilateral iliac screws in place. There are postoperative changes within the L4-5 disc space. There are laminectomy defects at L2, L3, L4 and L5. There is abnormal density within the L1-2 disc space suggestive of severe degenerative disc disease.. There is anterolisthesis of L4 relative to L5. There are no compression fractures.  No pars defects 
1/23/2025  No dictation     US GALLBLADDER RUQ    Result Date: 1/23/2025  PROCEDURE: US GALLBLADDER RUQ CLINICAL INFORMATION: Gallbladder distention TECHNIQUE: Ultrasound of the right upper quadrant was performed. Grayscale and color images were obtained. COMPARISON: None FINDINGS: The gallbladder is normal without evidence of wall thickening, calculi or pericholecystic fluid. The common bile duct is prominent. There is no intrahepatic biliary ductal dilation. The liver is normal in echotexture. Visualized pancreas and right kidney are unremarkable.     Normal gallbladder ultrasound without evidence of cholelithiasis. **This report has been created using voice recognition software. It may contain minor errors which are inherent in voice recognition technology.** Electronically signed by Dr. Carlos Delgadillo    XR CHEST PORTABLE    Result Date: 1/23/2025  PROCEDURE: XR CHEST PORTABLE CLINICAL INFORMATION: concern for pneumonia COMPARISON: 10/23/2019 TECHNIQUE: AP mobile chest single view FINDINGS: The heart size is normal. Surgical staples are seen overlying the mediastinum. There is evidence of prior median sternotomy. Reticular linear opacities along the periphery of the lung as well as at the lung bases are noted which may present fibrotic scarring. This appears progressed from prior examination dated 10/23/2019. Although, cannot exclude underlying superimposed pneumonia.     1. Reticular linear opacities are seen preferentially along the periphery and lung bases bilaterally. Findings may represent fibrotic scarring. However, cannot exclude underlying pneumonia. **This report has been created using voice recognition software.  It may contain minor errors which are inherent in voice recognition technology.** Electronically signed by Dr. Scot Hickey      This note was dictated using M*Modal Fluency Direct so please excuse any grammatical or syntax errors as no guarantees can be provided that every mistake has 
density within the L1-2 disc space suggestive of severe degenerative disc disease.. There is anterolisthesis of L4 relative to L5. There are no compression fractures.  No pars defects are noted. There is diffuse osteopenia.. There are no gross abnormalities within the spinal canal. On the axial images, at T11-12 and T12-L1 there is no disc herniation, canal or foraminal stenosis. At L1-L2, there is mild to moderate canal, moderate to severe right and moderate left-sided foraminal stenosis. At L2-3, there are postoperative changes. There is no recurrent disc herniation, canal or foraminal stenosis. At L3-4, there are postoperative changes. There is no recurrent disc herniation, canal or foraminal stenosis. At L4-5, there are postoperative changes. There is no recurrent disc herniation or canal stenosis. There is mild to moderate bilateral foraminal stenosis. At L5-S1 there are postoperative changes. There is no recurrent disc herniation,  canal or foraminal  stenosis There  is degenerative change involving the sacroiliac joints bilaterally.     1. Extensive postoperative changes between L2 and S1. 2. Degenerative changes most marked at L1-2 at which level there is mild to moderate canal, moderate severe right and moderate left-sided foraminal stenosis. 3. There is no recurrent disc herniation, canal stenosis at L2-3, L3-4, L4-5 and L5-S1. There is mild to moderate bilateral foraminal stenosis at L4-5. 4. There is degenerative change involving the sacroiliac joints bilaterally. **This report has been created using voice recognition software. It may contain minor errors which are inherent in voice recognition technology.** Electronically signed by Dr. Marjan Smith    Colonoscopy    Result Date: 1/28/2025  No dictation     EGD    Result Date: 1/23/2025  No dictation     EGD    Result Date: 1/23/2025  No dictation     US GALLBLADDER RUQ    Result Date: 1/23/2025  PROCEDURE: US GALLBLADDER RUQ CLINICAL INFORMATION:

## 2025-02-05 NOTE — PLAN OF CARE
Problem: Discharge Planning  Goal: Discharge to home or other facility with appropriate resources  1/24/2025 1853 by Kell Irwin RN  Flowsheets (Taken 1/24/2025 1853)  Discharge to home or other facility with appropriate resources: Identify barriers to discharge with patient and caregiver  Note: Pt from home alone.      Problem: Pain  Goal: Verbalizes/displays adequate comfort level or baseline comfort level  Outcome: Progressing  Flowsheets (Taken 1/24/2025 1853)  Verbalizes/displays adequate comfort level or baseline comfort level: Assess pain using appropriate pain scale  Note: Chronic back pain. PRN percocet given . Effective   Care plan reviewed with patient.  Patient verbalizes understanding of the care plan and contributed to goal setting.    
  Problem: Discharge Planning  Goal: Discharge to home or other facility with appropriate resources  1/25/2025 0444 by Agustin Balderrama, RN  Outcome: Progressing  Flowsheets (Taken 1/25/2025 0444)  Discharge to home or other facility with appropriate resources:   Identify barriers to discharge with patient and caregiver   Identify discharge learning needs (meds, wound care, etc)     Problem: Pain  Goal: Verbalizes/displays adequate comfort level or baseline comfort level  1/25/2025 0444 by Agustin Balderrama, RN  Outcome: Progressing  Flowsheets (Taken 1/25/2025 0444)  Verbalizes/displays adequate comfort level or baseline comfort level:   Encourage patient to monitor pain and request assistance   Assess pain using appropriate pain scale     Problem: Safety - Adult  Goal: Free from fall injury  Outcome: Progressing  Note: Bed locked & in low position, call light in reach, side-rails up x2, bed/chair alarm utilized, non-slip socks on when ambulating, reminded patient to use call light to call for assistance.      Problem: Skin/Tissue Integrity  Goal: Skin integrity remains intact  Description: 1.  Monitor for areas of redness and/or skin breakdown  2.  Assess vascular access sites hourly  3.  Every 4-6 hours minimum:  Change oxygen saturation probe site  4.  Every 4-6 hours:  If on nasal continuous positive airway pressure, respiratory therapy assess nares and determine need for appliance change or resting period  Outcome: Progressing  Flowsheets (Taken 1/25/2025 0444)  Skin Integrity Remains Intact: Monitor for areas of redness and/or skin breakdown     Problem: ABCDS Injury Assessment  Goal: Absence of physical injury  Outcome: Progressing  Flowsheets (Taken 1/25/2025 0444)  Absence of Physical Injury: Implement safety measures based on patient assessment   Care plan reviewed with patient.  Patient verbalizes understanding of the care plan and contributed to goal setting.   
  Problem: Discharge Planning  Goal: Discharge to home or other facility with appropriate resources  1/30/2025 1024 by Kell Irwin RN  Outcome: Progressing  Flowsheets (Taken 1/30/2025 1024)  Discharge to home or other facility with appropriate resources: Identify barriers to discharge with patient and caregiver  Note: Pt from home alone with home health . Plans to return there at d/c     Problem: Pain  Goal: Verbalizes/displays adequate comfort level or baseline comfort level  1/30/2025 1024 by Kell Irwin RN  Outcome: Progressing  Flowsheets (Taken 1/30/2025 1024)  Verbalizes/displays adequate comfort level or baseline comfort level: Assess pain using appropriate pain scale  Note: Chronic pain in back. Spine consulted and no changes. Pt to follow up with Dr Carter outpatient.      Problem: Safety - Adult  Goal: Free from fall injury  1/30/2025 1024 by Kell Irwin RN  Outcome: Progressing  Flowsheets (Taken 1/30/2025 1024)  Free From Fall Injury: Instruct family/caregiver on patient safety  Note: Bed locked & in low position, call light in reach, side-rails up x2, bed/chair alarm utilized, non-slip socks on when ambulating, reminded patient to use call light to call for assistance.       Problem: Skin/Tissue Integrity  Goal: Skin integrity remains intact  Description: 1.  Monitor for areas of redness and/or skin breakdown  2.  Assess vascular access sites hourly  3.  Every 4-6 hours minimum:  Change oxygen saturation probe site  4.  Every 4-6 hours:  If on nasal continuous positive airway pressure, respiratory therapy assess nares and determine need for appliance change or resting period  1/30/2025 1024 by Kell Irwin RN  Outcome: Progressing  Flowsheets (Taken 1/30/2025 1024)  Skin Integrity Remains Intact: Monitor for areas of redness and/or skin breakdown  Note: Pillow support while in bed. Change position every 2 hours.      Problem: Respiratory - Adult  Goal: Achieves optimal 
  Problem: Discharge Planning  Goal: Discharge to home or other facility with appropriate resources  2/2/2025 1541 by Belkis Carpenter RN  Outcome: Progressing  Flowsheets (Taken 2/2/2025 0845)  Discharge to home or other facility with appropriate resources: Identify barriers to discharge with patient and caregiver   Patient will be discharged when medically stable    Problem: Pain  Goal: Verbalizes/displays adequate comfort level or baseline comfort level  2/2/2025 1541 by Belkis Carpenter RN  Outcome: Progressing  Flowsheets (Taken 2/2/2025 0845)  Verbalizes/displays adequate comfort level or baseline comfort level: Encourage patient to monitor pain and request assistance   Patient will have acceptable pain    Problem: Safety - Adult  Goal: Free from fall injury  2/2/2025 1541 by Belkis Carpenter RN  Outcome: Progressing   Patient will be free from fall and injury while in hospital     Care plan reviewed with patient.  Patient verbalized understanding of the plan of care and contribute to goal setting.    
  Problem: Discharge Planning  Goal: Discharge to home or other facility with appropriate resources  2/5/2025 0334 by Ed Lemus RN  Outcome: Progressing  Flowsheets (Taken 2/4/2025 2045)  Discharge to home or other facility with appropriate resources:   Identify barriers to discharge with patient and caregiver   Arrange for needed discharge resources and transportation as appropriate   Identify discharge learning needs (meds, wound care, etc)     Problem: Pain  Goal: Verbalizes/displays adequate comfort level or baseline comfort level  2/5/2025 0334 by Ed Lemus RN  Outcome: Progressing     Problem: Safety - Adult  Goal: Free from fall injury  2/5/2025 0334 by Ed Lemus RN  Outcome: Progressing     Problem: Skin/Tissue Integrity  Goal: Skin integrity remains intact  Description: 1.  Monitor for areas of redness and/or skin breakdown  2.  Assess vascular access sites hourly  3.  Every 4-6 hours minimum:  Change oxygen saturation probe site  4.  Every 4-6 hours:  If on nasal continuous positive airway pressure, respiratory therapy assess nares and determine need for appliance change or resting period  2/5/2025 0334 by Ed Lemus RN  Outcome: Progressing  Flowsheets (Taken 2/4/2025 2045)  Skin Integrity Remains Intact: Monitor for areas of redness and/or skin breakdown     Problem: ABCDS Injury Assessment  Goal: Absence of physical injury  2/5/2025 0334 by Ed Lemus RN  Outcome: Progressing     Problem: Respiratory - Adult  Goal: Achieves optimal ventilation and oxygenation  2/5/2025 0334 by Ed Lemus RN  Outcome: Progressing  Flowsheets (Taken 2/4/2025 2045)  Achieves optimal ventilation and oxygenation:   Assess for changes in respiratory status   Assess for changes in mentation and behavior   Position to facilitate oxygenation and minimize respiratory effort     Problem: Cardiovascular - Adult  Goal: Maintains optimal cardiac output and hemodynamic stability  2/5/2025 0334 by Allan 
  Problem: Discharge Planning  Goal: Discharge to home or other facility with appropriate resources  Outcome: Progressing  Flowsheets (Taken 1/24/2025 0127)  Discharge to home or other facility with appropriate resources:   Identify barriers to discharge with patient and caregiver   Identify discharge learning needs (meds, wound care, etc)   Refer to discharge planning if patient needs post-hospital services based on physician order or complex needs related to functional status, cognitive ability or social support system   Arrange for needed discharge resources and transportation as appropriate     Problem: Pain  Goal: Verbalizes/displays adequate comfort level or baseline comfort level  Outcome: Progressing  Flowsheets (Taken 1/24/2025 0127)  Verbalizes/displays adequate comfort level or baseline comfort level:   Encourage patient to monitor pain and request assistance   Administer analgesics based on type and severity of pain and evaluate response   Consider cultural and social influences on pain and pain management   Assess pain using appropriate pain scale   Implement non-pharmacological measures as appropriate and evaluate response     Problem: Safety - Adult  Goal: Free from fall injury  Outcome: Progressing  Flowsheets (Taken 1/24/2025 0127)  Free From Fall Injury: Instruct family/caregiver on patient safety     Problem: Skin/Tissue Integrity  Goal: Skin integrity remains intact  Description: 1.  Monitor for areas of redness and/or skin breakdown  2.  Assess vascular access sites hourly  3.  Every 4-6 hours minimum:  Change oxygen saturation probe site  4.  Every 4-6 hours:  If on nasal continuous positive airway pressure, respiratory therapy assess nares and determine need for appliance change or resting period  Outcome: Progressing  Flowsheets (Taken 1/24/2025 0127)  Skin Integrity Remains Intact: Monitor for areas of redness and/or skin breakdown     Problem: ABCDS Injury Assessment  Goal: Absence of 
  Problem: Discharge Planning  Goal: Discharge to home or other facility with appropriate resources  Outcome: Progressing  Flowsheets (Taken 1/25/2025 0835 by Brent Jarquin, RN)  Discharge to home or other facility with appropriate resources: Identify barriers to discharge with patient and caregiver     Problem: Pain  Goal: Verbalizes/displays adequate comfort level or baseline comfort level  Outcome: Progressing  Flowsheets (Taken 1/25/2025 0829 by Brent Jarquin, RN)  Verbalizes/displays adequate comfort level or baseline comfort level: Encourage patient to monitor pain and request assistance     Problem: Safety - Adult  Goal: Free from fall injury  Outcome: Progressing  Flowsheets (Taken 1/24/2025 0127 by Stephania Mustafa, RN)  Free From Fall Injury: Instruct family/caregiver on patient safety     Problem: Skin/Tissue Integrity  Goal: Skin integrity remains intact  Description: 1.  Monitor for areas of redness and/or skin breakdown  2.  Assess vascular access sites hourly  3.  Every 4-6 hours minimum:  Change oxygen saturation probe site  4.  Every 4-6 hours:  If on nasal continuous positive airway pressure, respiratory therapy assess nares and determine need for appliance change or resting period  Outcome: Progressing  Flowsheets (Taken 1/25/2025 0835 by Brent Jarquin, RN)  Skin Integrity Remains Intact: Monitor for areas of redness and/or skin breakdown     Problem: ABCDS Injury Assessment  Goal: Absence of physical injury  Outcome: Progressing  Flowsheets (Taken 1/25/2025 0444 by Agustin Balderrama, RN)  Absence of Physical Injury: Implement safety measures based on patient assessment       Care plan reviewed with patient.  Patient verbalizes understanding of the care plan and contributed to goal setting.   
  Problem: Discharge Planning  Goal: Discharge to home or other facility with appropriate resources  Outcome: Progressing  Flowsheets (Taken 1/26/2025 0808)  Discharge to home or other facility with appropriate resources: Identify barriers to discharge with patient and caregiver     Problem: Pain  Goal: Verbalizes/displays adequate comfort level or baseline comfort level  Outcome: Progressing  Flowsheets (Taken 1/26/2025 0757)  Verbalizes/displays adequate comfort level or baseline comfort level: Encourage patient to monitor pain and request assistance     Problem: Safety - Adult  Goal: Free from fall injury  Outcome: Progressing     Problem: Skin/Tissue Integrity  Goal: Skin integrity remains intact  Description: 1.  Monitor for areas of redness and/or skin breakdown  2.  Assess vascular access sites hourly  3.  Every 4-6 hours minimum:  Change oxygen saturation probe site  4.  Every 4-6 hours:  If on nasal continuous positive airway pressure, respiratory therapy assess nares and determine need for appliance change or resting period  Outcome: Progressing  Flowsheets  Taken 1/26/2025 1515  Skin Integrity Remains Intact: Monitor for areas of redness and/or skin breakdown  Taken 1/26/2025 0808  Skin Integrity Remains Intact: Monitor for areas of redness and/or skin breakdown     Problem: ABCDS Injury Assessment  Goal: Absence of physical injury  Outcome: Progressing     Problem: Respiratory - Adult  Goal: Achieves optimal ventilation and oxygenation  Outcome: Progressing     Problem: Cardiovascular - Adult  Goal: Maintains optimal cardiac output and hemodynamic stability  Outcome: Progressing     Problem: Cardiovascular - Adult  Goal: Absence of cardiac dysrhythmias or at baseline  Outcome: Progressing     Problem: Skin/Tissue Integrity - Adult  Goal: Skin integrity remains intact  Description: 1.  Monitor for areas of redness and/or skin breakdown  2.  Assess vascular access sites hourly  3.  Every 4-6 hours minimum: 
  Problem: Discharge Planning  Goal: Discharge to home or other facility with appropriate resources  Outcome: Progressing  Flowsheets (Taken 1/28/2025 0216)  Discharge to home or other facility with appropriate resources:   Identify barriers to discharge with patient and caregiver   Identify discharge learning needs (meds, wound care, etc)     Problem: Pain  Goal: Verbalizes/displays adequate comfort level or baseline comfort level  Outcome: Progressing  Flowsheets (Taken 1/28/2025 0216)  Verbalizes/displays adequate comfort level or baseline comfort level:   Encourage patient to monitor pain and request assistance   Administer analgesics based on type and severity of pain and evaluate response   Assess pain using appropriate pain scale   Implement non-pharmacological measures as appropriate and evaluate response  Note: Ongoing assessment & interventions provided throughout shift.  Reminded patient to report any pain, pressure, or shortness of breath to the nurse.  Pain medications provided per physician's orders.       Problem: Safety - Adult  Goal: Free from fall injury  Outcome: Progressing  Flowsheets (Taken 1/28/2025 0216)  Free From Fall Injury: Instruct family/caregiver on patient safety  Note: Bed locked & in low position, call light in reach, side-rails up x2, bed/chair alarm utilized, non-slip socks on when ambulating, reminded patient to use call light to call for assistance.      Problem: Skin/Tissue Integrity  Goal: Skin integrity remains intact  Description: 1.  Monitor for areas of redness and/or skin breakdown  2.  Assess vascular access sites hourly  3.  Every 4-6 hours minimum:  Change oxygen saturation probe site  4.  Every 4-6 hours:  If on nasal continuous positive airway pressure, respiratory therapy assess nares and determine need for appliance change or resting period  Outcome: Progressing  Flowsheets (Taken 1/28/2025 0216)  Skin Integrity Remains Intact: Monitor for areas of redness and/or 
  Problem: Discharge Planning  Goal: Discharge to home or other facility with appropriate resources  Outcome: Progressing  Flowsheets (Taken 1/29/2025 0225)  Discharge to home or other facility with appropriate resources: Identify barriers to discharge with patient and caregiver     Problem: Pain  Goal: Verbalizes/displays adequate comfort level or baseline comfort level  Outcome: Progressing  Flowsheets (Taken 1/29/2025 0225)  Verbalizes/displays adequate comfort level or baseline comfort level:   Administer analgesics based on type and severity of pain and evaluate response   Encourage patient to monitor pain and request assistance   Assess pain using appropriate pain scale   Implement non-pharmacological measures as appropriate and evaluate response  Note: Ongoing assessment & interventions provided throughout shift.  Reminded patient to report any pain, pressure, or shortness of breath to the nurse.  Pain medications provided per physician's orders.       Problem: Safety - Adult  Goal: Free from fall injury  Outcome: Progressing  Flowsheets (Taken 1/29/2025 0225)  Free From Fall Injury: Instruct family/caregiver on patient safety  Note: Bed locked & in low position, call light in reach, side-rails up x2, bed/chair alarm utilized, non-slip socks on when ambulating, reminded patient to use call light to call for assistance.       Problem: Skin/Tissue Integrity  Goal: Skin integrity remains intact  Description: 1.  Monitor for areas of redness and/or skin breakdown  2.  Assess vascular access sites hourly  3.  Every 4-6 hours minimum:  Change oxygen saturation probe site  4.  Every 4-6 hours:  If on nasal continuous positive airway pressure, respiratory therapy assess nares and determine need for appliance change or resting period  Outcome: Progressing  Flowsheets (Taken 1/29/2025 0225)  Skin Integrity Remains Intact: Monitor for areas of redness and/or skin breakdown  Note: Ongoing assessment & interventions 
  Problem: Discharge Planning  Goal: Discharge to home or other facility with appropriate resources  Outcome: Progressing  Flowsheets (Taken 1/30/2025 0249)  Discharge to home or other facility with appropriate resources: Identify barriers to discharge with patient and caregiver     Problem: Pain  Goal: Verbalizes/displays adequate comfort level or baseline comfort level  Outcome: Progressing  Flowsheets (Taken 1/30/2025 0249)  Verbalizes/displays adequate comfort level or baseline comfort level:   Encourage patient to monitor pain and request assistance   Administer analgesics based on type and severity of pain and evaluate response   Assess pain using appropriate pain scale   Implement non-pharmacological measures as appropriate and evaluate response  Note: Ongoing assessment & interventions provided throughout shift.  Reminded patient to report any pain, pressure, or shortness of breath to the nurse.  Pain medications provided per physician's orders.       Problem: Safety - Adult  Goal: Free from fall injury  Outcome: Progressing  Flowsheets (Taken 1/30/2025 0249)  Free From Fall Injury: Instruct family/caregiver on patient safety  Note: Bed locked & in low position, call light in reach, side-rails up x2, bed/chair alarm utilized, non-slip socks on when ambulating, reminded patient to use call light to call for assistance.      Problem: Skin/Tissue Integrity  Goal: Skin integrity remains intact  Description: 1.  Monitor for areas of redness and/or skin breakdown  2.  Assess vascular access sites hourly  3.  Every 4-6 hours minimum:  Change oxygen saturation probe site  4.  Every 4-6 hours:  If on nasal continuous positive airway pressure, respiratory therapy assess nares and determine need for appliance change or resting period  Outcome: Progressing  Flowsheets (Taken 1/30/2025 0249)  Skin Integrity Remains Intact: Monitor for areas of redness and/or skin breakdown  Note: Ongoing assessment & interventions 
  Problem: Discharge Planning  Goal: Discharge to home or other facility with appropriate resources  Outcome: Progressing  Flowsheets (Taken 2/4/2025 1601)  Discharge to home or other facility with appropriate resources: Identify barriers to discharge with patient and caregiver     Problem: Pain  Goal: Verbalizes/displays adequate comfort level or baseline comfort level  Outcome: Progressing  Flowsheets (Taken 2/4/2025 1853)  Verbalizes/displays adequate comfort level or baseline comfort level:   Encourage patient to monitor pain and request assistance   Assess pain using appropriate pain scale   Administer analgesics based on type and severity of pain and evaluate response   Implement non-pharmacological measures as appropriate and evaluate response     Problem: Safety - Adult  Goal: Free from fall injury  Outcome: Progressing   All fall precautions in place. Bed in low position, alarm activated and appropriate use of call light.     Problem: Skin/Tissue Integrity  Goal: Skin integrity remains intact  Description: 1.  Monitor for areas of redness and/or skin breakdown  2.  Assess vascular access sites hourly  3.  Every 4-6 hours minimum:  Change oxygen saturation probe site  4.  Every 4-6 hours:  If on nasal continuous positive airway pressure, respiratory therapy assess nares and determine need for appliance change or resting period  Outcome: Progressing  Flowsheets (Taken 2/4/2025 1601)  Skin Integrity Remains Intact: Monitor for areas of redness and/or skin breakdown     Problem: Respiratory - Adult  Goal: Achieves optimal ventilation and oxygenation  Outcome: Progressing  Flowsheets (Taken 2/4/2025 1601)  Achieves optimal ventilation and oxygenation:   Assess for changes in respiratory status   Assess for changes in mentation and behavior   Position to facilitate oxygenation and minimize respiratory effort     Problem: Cardiovascular - Adult  Goal: Maintains optimal cardiac output and hemodynamic 
  Problem: Pain  Goal: Verbalizes/displays adequate comfort level or baseline comfort level  Flowsheets (Taken 2/4/2025 0201)  Verbalizes/displays adequate comfort level or baseline comfort level:   Encourage patient to monitor pain and request assistance   Assess pain using appropriate pain scale   Administer analgesics based on type and severity of pain and evaluate response   Implement non-pharmacological measures as appropriate and evaluate response   Consider cultural and social influences on pain and pain management   Notify Licensed Independent Practitioner if interventions unsuccessful or patient reports new pain     Problem: Discharge Planning  Goal: Discharge to home or other facility with appropriate resources  Flowsheets (Taken 2/4/2025 0201)  Discharge to home or other facility with appropriate resources:   Identify barriers to discharge with patient and caregiver   Arrange for needed discharge resources and transportation as appropriate   Identify discharge learning needs (meds, wound care, etc)     Problem: Safety - Adult  Goal: Free from fall injury  Flowsheets (Taken 2/4/2025 0201)  Free From Fall Injury:   Instruct family/caregiver on patient safety   Based on caregiver fall risk screen, instruct family/caregiver to ask for assistance with transferring infant if caregiver noted to have fall risk factors     Problem: Skin/Tissue Integrity  Goal: Skin integrity remains intact  Description: 1.  Monitor for areas of redness and/or skin breakdown  2.  Assess vascular access sites hourly  3.  Every 4-6 hours minimum:  Change oxygen saturation probe site  4.  Every 4-6 hours:  If on nasal continuous positive airway pressure, respiratory therapy assess nares and determine need for appliance change or resting period  Flowsheets (Taken 2/4/2025 0201)  Skin Integrity Remains Intact: Monitor for areas of redness and/or skin breakdown     Problem: Respiratory - Adult  Goal: Achieves optimal ventilation and 
Consult received.  Please see SW note dated 2:06 pm.   
of redness and/or skin breakdown     Problem: ABCDS Injury Assessment  Goal: Absence of physical injury  1/26/2025 2320 by Stephania Mustafa RN  Outcome: Progressing  Flowsheets (Taken 1/26/2025 2320)  Absence of Physical Injury: Implement safety measures based on patient assessment     Problem: Respiratory - Adult  Goal: Achieves optimal ventilation and oxygenation  1/26/2025 2320 by Stephania Msutafa RN  Outcome: Progressing  Flowsheets (Taken 1/26/2025 2320)  Achieves optimal ventilation and oxygenation: Assess for changes in respiratory status     Problem: Cardiovascular - Adult  Goal: Maintains optimal cardiac output and hemodynamic stability  1/26/2025 2320 by Stephania Mustafa RN  Outcome: Progressing  Flowsheets (Taken 1/26/2025 2320)  Maintains optimal cardiac output and hemodynamic stability:   Monitor blood pressure and heart rate   Assess for signs of decreased cardiac output     Problem: Cardiovascular - Adult  Goal: Absence of cardiac dysrhythmias or at baseline  1/26/2025 2320 by Stephania Mustafa RN  Outcome: Progressing  Flowsheets (Taken 1/26/2025 2320)  Absence of cardiac dysrhythmias or at baseline:   Monitor cardiac rate and rhythm   Assess for signs of decreased cardiac output     Problem: Skin/Tissue Integrity - Adult  Goal: Skin integrity remains intact  Description: 1.  Monitor for areas of redness and/or skin breakdown  2.  Assess vascular access sites hourly  3.  Every 4-6 hours minimum:  Change oxygen saturation probe site  4.  Every 4-6 hours:  If on nasal continuous positive airway pressure, respiratory therapy assess nares and determine need for appliance change or resting period  1/26/2025 2320 by Stephania Mutsafa RN  Outcome: Progressing  Flowsheets (Taken 1/26/2025 2320)  Skin Integrity Remains Intact: Monitor for areas of redness and/or skin breakdown     Problem: Skin/Tissue Integrity - Adult  Goal: Incisions, wounds, or drain sites healing without S/S of 
continuous positive airway pressure, respiratory therapy assess nares and determine need for appliance change or resting period  1/31/2025 0013 by Fauzia Powell RN  Outcome: Progressing  Flowsheets (Taken 1/30/2025 2041)  Skin Integrity Remains Intact:   Monitor for areas of redness and/or skin breakdown   Assess vascular access sites hourly     Problem: Skin/Tissue Integrity - Adult  Goal: Incisions, wounds, or drain sites healing without S/S of infection  Outcome: Progressing  Flowsheets (Taken 1/30/2025 2041)  Incisions, Wounds, or Drain Sites Healing Without Sign and Symptoms of Infection:   TWICE DAILY: Assess and document skin integrity   TWICE DAILY: Assess and document dressing/incision, wound bed, drain sites and surrounding tissue   Initiate isolation precautions as appropriate   Initiate pressure ulcer prevention bundle as indicated     Problem: Musculoskeletal - Adult  Goal: Return mobility to safest level of function  Outcome: Progressing  Flowsheets (Taken 1/30/2025 2041)  Return Mobility to Safest Level of Function:   Assess patient stability and activity tolerance for standing, transferring and ambulating with or without assistive devices   Assist with transfers and ambulation using safe patient handling equipment as needed   Ensure adequate protection for wounds/incisions during mobilization   Obtain physical therapy/occupational therapy consults as needed     Problem: Musculoskeletal - Adult  Goal: Return ADL status to a safe level of function  Outcome: Progressing  Flowsheets (Taken 1/30/2025 2041)  Return ADL Status to a Safe Level of Function:   Administer medication as ordered   Assess activities of daily living deficits and provide assistive devices as needed   Obtain physical therapy/occupational therapy consults as needed   Assist and instruct patient to increase activity and self care as tolerated     Problem: Gastrointestinal - Adult  Goal: Minimal or absence of nausea and 
maintained within normal limits: Monitor labs and assess patient for signs and symptoms of electrolyte imbalances     Problem: Metabolic/Fluid and Electrolytes - Adult  Goal: Hemodynamic stability and optimal renal function maintained  1/28/2025 1055 by Violetta Contreras RN  Outcome: Progressing  Flowsheets (Taken 1/28/2025 0216 by Mely Jacobs RN)  Hemodynamic stability and optimal renal function maintained: Monitor labs and assess for signs and symptoms of volume excess or deficit     Problem: Nutrition Deficit:  Goal: Optimize nutritional status  1/28/2025 1055 by Violetta Contreras RN  Outcome: Progressing  Flowsheets (Taken 1/28/2025 0216 by Mely Jacobs RN)  Nutrient intake appropriate for improving, restoring, or maintaining nutritional needs: Monitor oral intake, labs, and treatment plans     
0232 by Fauzia Powell RN  Outcome: Progressing  Flowsheets (Taken 2/2/2025 2055)  Hemodynamic stability and optimal renal function maintained:   Monitor labs and assess for signs and symptoms of volume excess or deficit   Monitor intake, output and patient weight   Monitor urine specific gravity, serum osmolarity and serum sodium as indicated or ordered     Problem: Nutrition Deficit:  Goal: Optimize nutritional status  2/3/2025 0232 by Fauzia Powell RN  Outcome: Progressing     Problem: Neurosensory - Adult  Goal: Achieves stable or improved neurological status  2/3/2025 0232 by Fauzia Powell RN  Outcome: Progressing  Flowsheets (Taken 2/2/2025 2055)  Achieves stable or improved neurological status:   Assess for and report changes in neurological status   Initiate measures to prevent increased intracranial pressure   Maintain blood pressure and fluid volume within ordered parameters to optimize cerebral perfusion and minimize risk of hemorrhage   Monitor temperature, glucose, and sodium. Initiate appropriate interventions as ordered     Problem: Genitourinary - Adult  Goal: Absence of urinary retention  2/3/2025 0232 by Fauzia Powell RN  Outcome: Progressing  Flowsheets (Taken 2/2/2025 2055)  Absence of urinary retention:   Assess patient’s ability to void and empty bladder   Monitor intake/output and perform bladder scan as needed     Problem: Anxiety  Goal: Will report anxiety at manageable levels  Description: INTERVENTIONS:  1. Administer medication as ordered  2. Teach and rehearse alternative coping skills  3. Provide emotional support with 1:1 interaction with staff  2/3/2025 0232 by Fauzia Powell RN  Outcome: Progressing  Flowsheets (Taken 2/2/2025 2055)  Will report anxiety at manageable levels:   Administer medication as ordered   Teach and rehearse alternative coping skills     Problem: Coping  Goal: Pt/Family able to verbalize concerns and demonstrate effective coping 
interaction with staff  Outcome: Progressing  Flowsheets (Taken 2/1/2025 1955)  Will report anxiety at manageable levels: Administer medication as ordered     Problem: Coping  Goal: Pt/Family able to verbalize concerns and demonstrate effective coping strategies  Description: INTERVENTIONS:  1. Assist patient/family to identify coping skills, available support systems and cultural and spiritual values  2. Provide emotional support, including active listening and acknowledgement of concerns of patient and caregivers  3. Reduce environmental stimuli, as able  4. Instruct patient/family in relaxation techniques, as appropriate  5. Assess for spiritual pain/suffering and initiate Spiritual Care, Psychosocial Clinical Specialist consults as needed  Outcome: Progressing  Flowsheets (Taken 2/1/2025 1955)  Patient/family able to verbalize anxieties, fears, and concerns, and demonstrate effective coping:   Assist patient/family to identify coping skills, available support systems and cultural and spiritual values   Provide emotional support, including active listening and acknowledgement of concerns of patient and caregivers   Reduce environmental stimuli, as able.    Patient educated on how to use incentive spirometer. Patient verbalized understanding and demonstrated proper use. Emphasized importance and usage of device, with coughing and deep breathing every 2 hours while awake.       Care plan reviewed with patient and family.  Patient and family verbalize understanding of the plan of care and contribute to goal setting.

## (undated) DEVICE — LINE ASPIR 1/4 ANTICOAG

## (undated) DEVICE — Z CONVERTED USE 2715898 SWABSTICK MEDICATED W1.75XL6.5IN 1.6ML 3.15% CHG 70% ISO

## (undated) DEVICE — SPONGE LAP W18XL18IN WHT COT 4 PLY FLD STRUNG RADPQ DISP ST

## (undated) DEVICE — TUBING, SUCTION, 1/4" X 20', STRAIGHT: Brand: MEDLINE INDUSTRIES, INC.

## (undated) DEVICE — BLADE ES ELASTOMERIC COAT INSUL DURABLE BEND UPTO 90DEG

## (undated) DEVICE — DRAIN SURG 10FR PVC TB W/ TRCR MID PERF NO RESVR HUBLESS

## (undated) DEVICE — BASIC SINGLE BASIN BTC-LF: Brand: MEDLINE INDUSTRIES, INC.

## (undated) DEVICE — STRIP,CLOSURE,WOUND,MEDI-STRIP,1/2X4: Brand: MEDLINE

## (undated) DEVICE — SUTURE VCRL SZ 3-0 L27IN ABSRB UD FS-2 L19MM 1/2 CIR J423H

## (undated) DEVICE — E-Z CLEAN, NON-STICK, PTFE COATED, ELECTROSURGICAL BLADE ELECTRODE, 6.5 INCH (16.5 CM): Brand: MEGADYNE

## (undated) DEVICE — AEGIS 1" DISK 4MM HOLE, PEEL OPEN: Brand: MEDLINE

## (undated) DEVICE — Z DISCONTINUED APPLICATOR SURG PREP 0.35OZ 2% CHG 70% ISO ALC W/ HI LT

## (undated) DEVICE — SUTURE VCRL SZ 2-0 L27IN ABSRB UD L36MM CP-1 1/2 CIR REV J266H

## (undated) DEVICE — SOLUTION IV 1000ML 0.9% SOD CHL PH 5 INJ USP VIAFLX PLAS

## (undated) DEVICE — SPONGE GZ W4XL4IN COT 12 PLY TYP VII WVN C FLD DSGN

## (undated) DEVICE — 3M™ TRANSPORE™ WHITE SURGICAL TAPE 1534-1, 1 INCH X 10 YARD (2,5CM X 9,1M), 12 ROLLS/CARTON 10 CARTONS/CASE: Brand: 3M™ TRANSPORE™

## (undated) DEVICE — 1010 S-DRAPE TOWEL DRAPE 10/BX: Brand: STERI-DRAPE™

## (undated) DEVICE — BIPOLAR SEALER 23-112-1 AQM 6.0: Brand: AQUAMANTYS ®

## (undated) DEVICE — SOLUTION IV IRRIG POUR BRL 0.9% SODIUM CHL 2F7124

## (undated) DEVICE — E-Z CLEAN, NON-STICK, PTFE COATED, ELECTROSURGICAL BLADE ELECTRODE, MODIFIED EXTENDED INSULATION, 4 INCH (10.2 CM): Brand: MEGADYNE

## (undated) DEVICE — GOWN,SIRUS,NONRNF,SETINSLV,XL,20/CS: Brand: MEDLINE

## (undated) DEVICE — SUTURE PERMA-HAND SZ 3-0 L30IN NONABSORBABLE BLK L60MM KS 622H

## (undated) DEVICE — BUR RND FLUT AGRSV 5MM

## (undated) DEVICE — DRESSING,GAUZE,XEROFORM,CURAD,5"X9",ST: Brand: CURAD

## (undated) DEVICE — SUTURE VCRL SZ 3-0 L18IN ABSRB VLT SUTUPAK PRECUT W/O NDL J104T

## (undated) DEVICE — GLOVE SURG SZ 65 THK91MIL LTX FREE SYN POLYISOPRENE

## (undated) DEVICE — SUTURE ETHLN SZ 2-0 L30IN NONABSORBABLE BLK L36MM FSLX 3/8 1674H

## (undated) DEVICE — GLOVE ORANGE PI 7   MSG9070

## (undated) DEVICE — ROYAL SILK SURGICAL GOWN, XXL: Brand: CONVERTORS

## (undated) DEVICE — INTENDED FOR TISSUE SEPARATION, AND OTHER PROCEDURES THAT REQUIRE A SHARP SURGICAL BLADE TO PUNCTURE OR CUT.: Brand: BARD-PARKER ® CARBON RIB-BACK BLADES

## (undated) DEVICE — CODMAN® SURGICAL PATTIES 1" X 1" (2.54CM X 2.54CM): Brand: CODMAN®

## (undated) DEVICE — THE MILL DISPOSABLE - MEDIUM

## (undated) DEVICE — Device

## (undated) DEVICE — BLADE ES L6IN ELASTOMERIC COAT EXT DURABLE BEND UPTO 90DEG

## (undated) DEVICE — SUTURE VCRL SZ 1 L27IN ABSRB UD CT-1 L36MM 1/2 CIR J261H

## (undated) DEVICE — JCKSON TBL POSTNER NO HD REST: Brand: MEDLINE INDUSTRIES, INC.

## (undated) DEVICE — TUBING PRSS 36 M F

## (undated) DEVICE — DRAIN SURG FLAT W7MMXL20CM FULL PERF

## (undated) DEVICE — PROBE STIM 3 MM FOR PEDCL SCREW DISP

## (undated) DEVICE — TOTAL TRAY, DB, 100% SILI FOLEY, 16FR 10: Brand: MEDLINE

## (undated) DEVICE — GAUZE,SPONGE,4"X4",12PLY,STERILE,LF,2'S: Brand: MEDLINE

## (undated) DEVICE — SUTURE MCRYL SZ 4-0 L27IN ABSRB UD L19MM PS-2 1/2 CIR PRIM Y426H

## (undated) DEVICE — SET CATH 20GA L1.75IN RAD ART POLYUR RADPQ W/ INTEGR

## (undated) DEVICE — 450 ML BOTTLE OF 0.05% CHLORHEXIDINE GLUCONATE IN 99.95% STERILE WATER FOR IRRIGATION, USP AND APPLICATOR.: Brand: IRRISEPT ANTIMICROBIAL WOUND LAVAGE

## (undated) DEVICE — SUTURE ETHLN SZ 3-0 L18IN NONABSORBABLE BLK FS-1 L24MM 3/8 663H

## (undated) DEVICE — 4-PORT MANIFOLD: Brand: NEPTUNE 2

## (undated) DEVICE — BLANKET WRM W29.9XL79.1IN UP BODY FORC AIR MISTRAL-AIR

## (undated) DEVICE — 3M™ STERI-STRIP™ COMPOUND BENZOIN TINCTURE 40 BAGS/CARTON 4 CARTONS/CASE C1544: Brand: 3M™ STERI-STRIP™

## (undated) DEVICE — YANKAUER,BULB TIP,W/O VENT,RIGID,STERILE: Brand: MEDLINE

## (undated) DEVICE — 500ML,PRESSURE INFUSER W/STOPCOCK: Brand: MEDLINE

## (undated) DEVICE — SOLUTION IV 500ML 0.9% SOD CHL PH 5 INJ USP VIAFLX PLAS

## (undated) DEVICE — KIT EVAC 400CC PVC RADPQ Y CONN

## (undated) DEVICE — KAIRISON TUBING SET PNEUMATIC, (3000 MM), STERILE, DISPOSABLE, TO BE USED WITH: FK898R, PACKAGE OF 10 PIECES: Brand: KAIRISON

## (undated) DEVICE — PAD,O.R.,TABLE,CONV FOAM,2X20X72: Brand: MEDLINE

## (undated) DEVICE — SET AUTOTRNS C175ML BOWL BTM OUTLT RESERVOIRXTRA

## (undated) DEVICE — SPLINT ARMBRD W3XL10.5IN POLYFOAM DLX A LN

## (undated) DEVICE — GLOVE ORTHO 8   MSG9480

## (undated) DEVICE — GLOVE ORANGE PI 8 1/2   MSG9085

## (undated) DEVICE — BANDAGE COMPR E SGL LAYERED CLSR BGE W/ CLP W4INXL15FT

## (undated) DEVICE — GLOVE ORANGE PI 8   MSG9080

## (undated) DEVICE — GOWN,SIRUS,NON REINFRCD,LARGE,SET IN SL: Brand: MEDLINE

## (undated) DEVICE — FLOSEAL HEMOSTATIC MATRIX, 5 ML: Brand: FLOSEAL

## (undated) DEVICE — DRAPE MICSCP W132XL406CM LENS DIA68MM W VARI LENS2 FOR LEICA

## (undated) DEVICE — LINER SUCT CANSTR 1500CC SEMI RIG W/ POR HYDROPHOBIC SHUT

## (undated) DEVICE — SET CATH 20GA L1.5IN RAD ART POLYUR RADPQ W/ INTEGR 0.018IN

## (undated) DEVICE — HEAD POSITIONER, SURGICAL: Brand: DEROYAL